# Patient Record
Sex: MALE | URBAN - METROPOLITAN AREA
[De-identification: names, ages, dates, MRNs, and addresses within clinical notes are randomized per-mention and may not be internally consistent; named-entity substitution may affect disease eponyms.]

---

## 2017-10-31 ENCOUNTER — IMPORTED ENCOUNTER (OUTPATIENT)
Dept: URBAN - METROPOLITAN AREA CLINIC 1 | Facility: CLINIC | Age: 67
End: 2017-10-31

## 2017-10-31 PROBLEM — H11.153: Noted: 2017-10-31

## 2017-10-31 PROBLEM — H25.813: Noted: 2017-10-31

## 2017-10-31 PROBLEM — H40.013: Noted: 2017-10-31

## 2017-10-31 PROCEDURE — 92015 DETERMINE REFRACTIVE STATE: CPT

## 2017-10-31 PROCEDURE — 92004 COMPRE OPH EXAM NEW PT 1/>: CPT

## 2017-10-31 NOTE — PATIENT DISCUSSION
1.  Cataract OU:  Visually Significant discussed the risks benefits alternatives and limitations of cataract surgery. The patient stated a full understanding and a desire to proceed with the procedure. The patient will need to return for preop appointment with cataract measurements and to have any additional questions answered and start pre-operative eye drops as directed. Try IOL master today. OS then OD. Patient dot see Randal Ahumada follow-up2. COAG Suspect OU: (0.85/0.75)  IOP 10/10.  -Fm HX. Condition was discussed with patient. Will monitor patient for progression. Will do work up after phaco 3. Pinguecula OU -- Use of sunglasses when exposed to UV light and observation is recommended. 4. Return for an appointment for H and P. with Dr. Chelsea Ho.

## 2018-01-16 ENCOUNTER — IMPORTED ENCOUNTER (OUTPATIENT)
Dept: URBAN - METROPOLITAN AREA CLINIC 1 | Facility: CLINIC | Age: 68
End: 2018-01-16

## 2018-01-16 PROBLEM — H25.813: Noted: 2018-01-16

## 2018-01-16 PROCEDURE — 92012 INTRM OPH EXAM EST PATIENT: CPT

## 2018-01-16 PROCEDURE — 92136 OPHTHALMIC BIOMETRY: CPT

## 2018-01-16 NOTE — PATIENT DISCUSSION
1. Cataract OU:  Visually Significant secondary to glare discussed the risks benefits alternatives and limitations of cataract surgery. The patient stated a full understanding and a desire to proceed with the procedure. Instructed/ discussed with patient if gtts are expensive (over 150 dollars) to call our office so we can recommend alternatives. Pt understood. and Pt understands they will need glasses post-op to achieve their best corrected vision. Phaco PCL OS then OD. 2. COAG Suspect OU (CD 0.9 OU) Fm Hx- Mother. AA. W/u after Phaco. 3. PVD OD- RD precautions. Return for an appointment with Dr. Rocio Warner for AS/HP.

## 2018-01-24 ENCOUNTER — IMPORTED ENCOUNTER (OUTPATIENT)
Dept: URBAN - METROPOLITAN AREA CLINIC 1 | Facility: CLINIC | Age: 68
End: 2018-01-24

## 2018-01-24 PROBLEM — H25.812 COMBINED FORM OF SENILE CATARACT OF LEFT EYE: Status: ACTIVE | Noted: 2018-01-24

## 2018-01-24 PROBLEM — H25.812 COMBINED FORM OF SENILE CATARACT OF LEFT EYE: Status: RESOLVED | Noted: 2018-01-24 | Resolved: 2018-01-24

## 2018-01-25 ENCOUNTER — IMPORTED ENCOUNTER (OUTPATIENT)
Dept: URBAN - METROPOLITAN AREA CLINIC 1 | Facility: CLINIC | Age: 68
End: 2018-01-25

## 2018-01-25 PROBLEM — Z96.1: Noted: 2018-01-25

## 2018-01-25 PROCEDURE — 99024 POSTOP FOLLOW-UP VISIT: CPT

## 2018-01-25 NOTE — PATIENT DISCUSSION
POD#1 CE/IOL Standard OS doing well. Increase tear use to QID Continue all 3 gtts as prescribed and until gone. Ocuflox TIDPrednisolone BIDAcular QDPost op Warnings Reiterated RTC as scheduled

## 2018-02-02 ENCOUNTER — IMPORTED ENCOUNTER (OUTPATIENT)
Dept: URBAN - METROPOLITAN AREA CLINIC 1 | Facility: CLINIC | Age: 68
End: 2018-02-02

## 2018-02-02 PROBLEM — H25.811: Noted: 2018-02-02

## 2018-02-02 PROCEDURE — 92136 OPHTHALMIC BIOMETRY: CPT

## 2018-02-02 NOTE — PATIENT DISCUSSION
1.  Cataract OD: Visually Significant secondary to glare discussed the risks benefits alternatives and limitations of cataract surgery. The patient stated a full understanding and a desire to proceed with the procedure. Pt understands they will need glasses post-op to achieve their best corrected vision. Phaco PCL OD 2. POW#2  CE/IOL OS (Standard) doing well.   Use Prednisolone BID OS till out Use Acular Qdaily OS till out F/u as scheduled 2nd eye

## 2018-02-14 ENCOUNTER — IMPORTED ENCOUNTER (OUTPATIENT)
Dept: URBAN - METROPOLITAN AREA CLINIC 1 | Facility: CLINIC | Age: 68
End: 2018-02-14

## 2018-02-14 PROBLEM — H25.811 COMBINED FORM OF SENILE CATARACT OF RIGHT EYE: Status: RESOLVED | Noted: 2018-02-14 | Resolved: 2018-02-14

## 2018-02-14 PROBLEM — H25.811 COMBINED FORM OF SENILE CATARACT OF RIGHT EYE: Status: ACTIVE | Noted: 2018-02-14

## 2018-02-15 ENCOUNTER — IMPORTED ENCOUNTER (OUTPATIENT)
Dept: URBAN - METROPOLITAN AREA CLINIC 1 | Facility: CLINIC | Age: 68
End: 2018-02-15

## 2018-02-15 PROBLEM — Z96.1: Noted: 2018-02-15

## 2018-02-15 PROCEDURE — 99024 POSTOP FOLLOW-UP VISIT: CPT

## 2018-02-15 NOTE — PATIENT DISCUSSION
1. POD#1 CE/IOL OD (Standard)  doing well. Continue all 3 gtts as prescribed and until gone. Use Prednisolone BID OD Acular Qdaily OD Ocuflox TID OD 2. POW#2  CE/IOL OS (Standard)  doing well.    Use Prednisolone BID OS till out Use Acular Qdaily OS till out F/u as scheduled

## 2018-03-08 ENCOUNTER — IMPORTED ENCOUNTER (OUTPATIENT)
Dept: URBAN - METROPOLITAN AREA CLINIC 1 | Facility: CLINIC | Age: 68
End: 2018-03-08

## 2018-03-08 PROBLEM — Z96.1: Noted: 2018-03-08

## 2018-03-08 PROCEDURE — 99024 POSTOP FOLLOW-UP VISIT: CPT

## 2018-03-08 NOTE — PATIENT DISCUSSION
1. POM#1 CE/IOL Standard OU doing well. Continue Lotemax/Durezol/Prednisolone BID until gone. Continue Prolensa/Ilevro/Acular QD until gone. 2. Return for an appointment for a 30/OCT/HVF in 3-4 months with Dr. Ritika Freitas.

## 2018-08-04 ENCOUNTER — HOSPITAL ENCOUNTER (OUTPATIENT)
Age: 68
Setting detail: OBSERVATION
Discharge: HOME | End: 2018-08-04
Attending: EMERGENCY MEDICINE | Admitting: FAMILY MEDICINE
Payer: MEDICARE

## 2018-08-04 ENCOUNTER — APPOINTMENT (OUTPATIENT)
Dept: CT IMAGING | Age: 68
End: 2018-08-04
Attending: EMERGENCY MEDICINE
Payer: MEDICARE

## 2018-08-04 VITALS
OXYGEN SATURATION: 100 % | HEIGHT: 69 IN | WEIGHT: 226 LBS | RESPIRATION RATE: 19 BRPM | HEART RATE: 93 BPM | SYSTOLIC BLOOD PRESSURE: 129 MMHG | TEMPERATURE: 98.6 F | DIASTOLIC BLOOD PRESSURE: 69 MMHG | BODY MASS INDEX: 33.47 KG/M2

## 2018-08-04 DIAGNOSIS — R55 SYNCOPE AND COLLAPSE: Primary | ICD-10-CM

## 2018-08-04 DIAGNOSIS — F10.920 ALCOHOLIC INTOXICATION WITHOUT COMPLICATION (HCC): ICD-10-CM

## 2018-08-04 DIAGNOSIS — D64.9 LOW HEMOGLOBIN: ICD-10-CM

## 2018-08-04 PROBLEM — F10.929 ALCOHOL INTOXICATION (HCC): Status: ACTIVE | Noted: 2018-08-04

## 2018-08-04 LAB
ALBUMIN SERPL-MCNC: 3.4 G/DL (ref 3.4–5)
ALBUMIN/GLOB SERPL: 0.8 {RATIO} (ref 0.8–1.7)
ALP SERPL-CCNC: 76 U/L (ref 45–117)
ALT SERPL-CCNC: 17 U/L (ref 16–61)
ANION GAP SERPL CALC-SCNC: 7 MMOL/L (ref 3–18)
AST SERPL-CCNC: 33 U/L (ref 15–37)
BASOPHILS # BLD: 0.1 K/UL (ref 0–0.1)
BASOPHILS NFR BLD: 1 % (ref 0–2)
BILIRUB SERPL-MCNC: 0.2 MG/DL (ref 0.2–1)
BUN SERPL-MCNC: 6 MG/DL (ref 7–18)
BUN/CREAT SERPL: 7 (ref 12–20)
CALCIUM SERPL-MCNC: 8.5 MG/DL (ref 8.5–10.1)
CHLORIDE SERPL-SCNC: 113 MMOL/L (ref 100–108)
CO2 SERPL-SCNC: 25 MMOL/L (ref 21–32)
CREAT SERPL-MCNC: 0.89 MG/DL (ref 0.6–1.3)
DIFFERENTIAL METHOD BLD: ABNORMAL
EOSINOPHIL # BLD: 0.1 K/UL (ref 0–0.4)
EOSINOPHIL NFR BLD: 3 % (ref 0–5)
ERYTHROCYTE [DISTWIDTH] IN BLOOD BY AUTOMATED COUNT: 22.2 % (ref 11.6–14.5)
ETHANOL SERPL-MCNC: 307 MG/DL (ref 0–3)
GLOBULIN SER CALC-MCNC: 4.5 G/DL (ref 2–4)
GLUCOSE SERPL-MCNC: 114 MG/DL (ref 74–99)
HCT VFR BLD AUTO: 24.6 % (ref 36–48)
HGB BLD-MCNC: 7.5 G/DL (ref 13–16)
LYMPHOCYTES # BLD: 2.2 K/UL (ref 0.9–3.6)
LYMPHOCYTES NFR BLD: 53 % (ref 21–52)
MAGNESIUM SERPL-MCNC: 2.3 MG/DL (ref 1.6–2.6)
MCH RBC QN AUTO: 22.1 PG (ref 24–34)
MCHC RBC AUTO-ENTMCNC: 30.5 G/DL (ref 31–37)
MCV RBC AUTO: 72.4 FL (ref 74–97)
MONOCYTES # BLD: 0.5 K/UL (ref 0.05–1.2)
MONOCYTES NFR BLD: 12 % (ref 3–10)
NEUTS SEG # BLD: 1.3 K/UL (ref 1.8–8)
NEUTS SEG NFR BLD: 31 % (ref 40–73)
PLATELET # BLD AUTO: 204 K/UL (ref 135–420)
PMV BLD AUTO: 8.9 FL (ref 9.2–11.8)
POTASSIUM SERPL-SCNC: 3.9 MMOL/L (ref 3.5–5.5)
PROT SERPL-MCNC: 7.9 G/DL (ref 6.4–8.2)
RBC # BLD AUTO: 3.4 M/UL (ref 4.7–5.5)
SODIUM SERPL-SCNC: 145 MMOL/L (ref 136–145)
TROPONIN I SERPL-MCNC: <0.02 NG/ML (ref 0–0.04)
WBC # BLD AUTO: 4.3 K/UL (ref 4.6–13.2)

## 2018-08-04 PROCEDURE — 36430 TRANSFUSION BLD/BLD COMPNT: CPT

## 2018-08-04 PROCEDURE — 85025 COMPLETE CBC W/AUTO DIFF WBC: CPT | Performed by: EMERGENCY MEDICINE

## 2018-08-04 PROCEDURE — P9016 RBC LEUKOCYTES REDUCED: HCPCS | Performed by: EMERGENCY MEDICINE

## 2018-08-04 PROCEDURE — 83735 ASSAY OF MAGNESIUM: CPT | Performed by: EMERGENCY MEDICINE

## 2018-08-04 PROCEDURE — 74011000250 HC RX REV CODE- 250: Performed by: EMERGENCY MEDICINE

## 2018-08-04 PROCEDURE — 86900 BLOOD TYPING SEROLOGIC ABO: CPT | Performed by: EMERGENCY MEDICINE

## 2018-08-04 PROCEDURE — 80307 DRUG TEST PRSMV CHEM ANLYZR: CPT | Performed by: EMERGENCY MEDICINE

## 2018-08-04 PROCEDURE — 96365 THER/PROPH/DIAG IV INF INIT: CPT

## 2018-08-04 PROCEDURE — 70450 CT HEAD/BRAIN W/O DYE: CPT

## 2018-08-04 PROCEDURE — 84484 ASSAY OF TROPONIN QUANT: CPT | Performed by: EMERGENCY MEDICINE

## 2018-08-04 PROCEDURE — 99218 HC RM OBSERVATION: CPT

## 2018-08-04 PROCEDURE — 93005 ELECTROCARDIOGRAM TRACING: CPT

## 2018-08-04 PROCEDURE — 86920 COMPATIBILITY TEST SPIN: CPT | Performed by: EMERGENCY MEDICINE

## 2018-08-04 PROCEDURE — 99284 EMERGENCY DEPT VISIT MOD MDM: CPT

## 2018-08-04 PROCEDURE — 74011250636 HC RX REV CODE- 250/636: Performed by: EMERGENCY MEDICINE

## 2018-08-04 PROCEDURE — 80053 COMPREHEN METABOLIC PANEL: CPT | Performed by: EMERGENCY MEDICINE

## 2018-08-04 PROCEDURE — 96366 THER/PROPH/DIAG IV INF ADDON: CPT

## 2018-08-04 RX ORDER — SODIUM CHLORIDE 0.9 % (FLUSH) 0.9 %
5-10 SYRINGE (ML) INJECTION AS NEEDED
Status: DISCONTINUED | OUTPATIENT
Start: 2018-08-04 | End: 2018-08-05 | Stop reason: HOSPADM

## 2018-08-04 RX ORDER — LORAZEPAM 1 MG/1
1 TABLET ORAL
Status: DISCONTINUED | OUTPATIENT
Start: 2018-08-04 | End: 2018-08-05 | Stop reason: HOSPADM

## 2018-08-04 RX ORDER — LORAZEPAM 2 MG/ML
2 INJECTION INTRAMUSCULAR
Status: DISCONTINUED | OUTPATIENT
Start: 2018-08-04 | End: 2018-08-05 | Stop reason: HOSPADM

## 2018-08-04 RX ORDER — LORAZEPAM 1 MG/1
2 TABLET ORAL
Status: DISCONTINUED | OUTPATIENT
Start: 2018-08-04 | End: 2018-08-05 | Stop reason: HOSPADM

## 2018-08-04 RX ORDER — SODIUM CHLORIDE 0.9 % (FLUSH) 0.9 %
5-10 SYRINGE (ML) INJECTION EVERY 8 HOURS
Status: DISCONTINUED | OUTPATIENT
Start: 2018-08-04 | End: 2018-08-05 | Stop reason: HOSPADM

## 2018-08-04 RX ORDER — LORAZEPAM 2 MG/ML
3 INJECTION INTRAMUSCULAR
Status: DISCONTINUED | OUTPATIENT
Start: 2018-08-04 | End: 2018-08-05 | Stop reason: HOSPADM

## 2018-08-04 RX ORDER — SODIUM CHLORIDE 9 MG/ML
250 INJECTION, SOLUTION INTRAVENOUS AS NEEDED
Status: DISCONTINUED | OUTPATIENT
Start: 2018-08-04 | End: 2018-08-05 | Stop reason: HOSPADM

## 2018-08-04 RX ORDER — LORAZEPAM 2 MG/ML
1 INJECTION INTRAMUSCULAR
Status: DISCONTINUED | OUTPATIENT
Start: 2018-08-04 | End: 2018-08-05 | Stop reason: HOSPADM

## 2018-08-04 RX ADMIN — FOLIC ACID: 5 INJECTION, SOLUTION INTRAMUSCULAR; INTRAVENOUS; SUBCUTANEOUS at 15:54

## 2018-08-04 NOTE — Clinical Note
Patient Class[de-identified] Observation [385] Type of Bed: Telemetry [19] Reason for Observation: syncope Admitting Diagnosis: Syncope [109903] Admitting Diagnosis: Alcohol intoxication ([facility] [220033] Admitting Diagnosis: Anemia [596371] Admitting Physician: Dexter Francis Attending Physician: Dexter Francis

## 2018-08-04 NOTE — ED TRIAGE NOTES
Pt requesting a beer from staff at first encounter. Pt c/o falling 9 times since September 2015. Smells of etoh.

## 2018-08-04 NOTE — PROGRESS NOTES
5:56 PM :Pt care assumed from Dr. Jessica Reyna , ED provider. Pt complaint(s), current treatment plan, progression and available diagnostic results have been discussed thoroughly. Rounding occurred: yes  Intended Disposition: Home   Pending diagnostic reports and/or labs (please list): completion of transfusion and sober ride home     Pt has ride, stable for dc home. Pt ambulating without difficulty. Scribe Attestation     [employer] acting as a scribe for and in the presence of Kayce Durham DO      August 04, 2018 at 5:56 PM       Provider Attestation:      I personally performed the services described in the documentation, reviewed the documentation, as recorded by the scribe in my presence, and it accurately and completely records my words and actions.  August 04, 2018 at 5:56 PM - Kayce Durham DO

## 2018-08-04 NOTE — DISCHARGE INSTRUCTIONS
Fainting: Care Instructions  Your Care Instructions    When you faint, or pass out, you lose consciousness for a short time. A brief drop in blood flow to the brain often causes it. When you fall or lie down, more blood flows to your brain and you regain consciousness. Emotional stress, pain, or overheating-especially if you have been standing-can make you faint. In these cases, fainting is usually not serious. But fainting can be a sign of a more serious problem. Your doctor may want you to have more tests to rule out other causes. The treatment you need depends on the reason why you fainted. The doctor has checked you carefully, but problems can develop later. If you notice any problems or new symptoms, get medical treatment right away. Follow-up care is a key part of your treatment and safety. Be sure to make and go to all appointments, and call your doctor if you are having problems. It's also a good idea to know your test results and keep a list of the medicines you take. How can you care for yourself at home? · Drink plenty of fluids to prevent dehydration. If you have kidney, heart, or liver disease and have to limit fluids, talk with your doctor before you increase your fluid intake. When should you call for help? Call 911 anytime you think you may need emergency care. For example, call if:    · You have symptoms of a heart problem. These may include:  ¨ Chest pain or pressure. ¨ Severe trouble breathing. ¨ A fast or irregular heartbeat. ¨ Lightheadedness or sudden weakness. ¨ Coughing up pink, foamy mucus. ¨ Passing out. After you call 911, the  may tell you to chew 1 adult-strength or 2 to 4 low-dose aspirin. Wait for an ambulance. Do not try to drive yourself.     · You have symptoms of a stroke. These may include:  ¨ Sudden numbness, tingling, weakness, or loss of movement in your face, arm, or leg, especially on only one side of your body. ¨ Sudden vision changes.   ¨ Sudden trouble speaking. ¨ Sudden confusion or trouble understanding simple statements. ¨ Sudden problems with walking or balance. ¨ A sudden, severe headache that is different from past headaches.     · You passed out (lost consciousness) again.    Watch closely for changes in your health, and be sure to contact your doctor if:    · You do not get better as expected. Where can you learn more? Go to http://shaquille-licha.info/. Enter G826 in the search box to learn more about \"Fainting: Care Instructions. \"  Current as of: November 20, 2017  Content Version: 11.7  © 9468-1228 [employer]. Care instructions adapted under license by [employer] (which disclaims liability or warranty for this information). If you have questions about a medical condition or this instruction, always ask your healthcare professional. [address] any warranty or liability for your use of this information. Anemia: Care Instructions  Your Care Instructions    Anemia is a low level of red blood cells, which carry oxygen throughout your body. Many things can cause anemia. Lack of iron is one of the most common causes. Your body needs iron to make hemoglobin, a substance in red blood cells that carries oxygen from the lungs to your body's cells. Without enough iron, the body produces fewer and smaller red blood cells. As a result, your body's cells do not get enough oxygen, and you feel tired and weak. And you may have trouble concentrating. Bleeding is the most common cause of a lack of iron. You may have heavy menstrual bleeding or bleeding caused by conditions such as ulcers, hemorrhoids, or cancer. Regular use of aspirin or other anti-inflammatory medicines (such as ibuprofen) also can cause bleeding in some people.  A lack of iron in your diet also can cause anemia, especially at times when the body needs more iron, such as during pregnancy, infancy, and the teen years.  Your doctor may have prescribed iron pills. It may take several months of treatment for your iron levels to return to normal. Your doctor also may suggest that you eat foods that are rich in iron, such as meat and beans. There are many other causes of anemia. It is not always due to a lack of iron. Finding the specific cause of your anemia will help your doctor find the right treatment for you. Follow-up care is a key part of your treatment and safety. Be sure to make and go to all appointments, and call your doctor if you are having problems. It's also a good idea to know your test results and keep a list of the medicines you take. How can you care for yourself at home? · Take your medicines exactly as prescribed. Call your doctor if you think you are having a problem with your medicine. · If your doctor recommends iron pills, take them as directed:  ¨ Try to take the pills on an empty stomach about 1 hour before or 2 hours after meals. But you may need to take iron with food to avoid an upset stomach. ¨ Do not take antacids or drink milk or caffeine drinks (such as coffee, tea, or cola) at the same time or within 2 hours of the time that you take your iron. They can make it hard for your body to absorb the iron. ¨ Vitamin C (from food or supplements) helps your body absorb iron. Try taking iron pills with a glass of orange juice or some other food that is high in vitamin C, such as citrus fruits. ¨ Iron pills may cause stomach problems, such as heartburn, nausea, diarrhea, constipation, and cramps. Be sure to drink plenty of fluids, and include fruits, vegetables, and fiber in your diet each day. Iron pills often make your bowel movements dark or green. ¨ If you forget to take an iron pill, do not take a double dose of iron the next time you take a pill. ¨ Keep iron pills out of the reach of small children. An overdose of iron can be very dangerous.   · Follow your doctor's advice about eating iron-rich foods. These include red meat, shellfish, poultry, eggs, beans, raisins, whole-grain bread, and leafy green vegetables. · Steam vegetables to help them keep their iron content. When should you call for help? Call 911 anytime you think you may need emergency care. For example, call if:    · You have symptoms of a heart attack. These may include:  ¨ Chest pain or pressure, or a strange feeling in the chest.  ¨ Sweating. ¨ Shortness of breath. ¨ Nausea or vomiting. ¨ Pain, pressure, or a strange feeling in the back, neck, jaw, or upper belly or in one or both shoulders or arms. ¨ Lightheadedness or sudden weakness. ¨ A fast or irregular heartbeat. After you call 911, the  may tell you to chew 1 adult-strength or 2 to 4 low-dose aspirin. Wait for an ambulance. Do not try to drive yourself.     · You passed out (lost consciousness).    Call your doctor now or seek immediate medical care if:    · You have new or increased shortness of breath.     · You are dizzy or lightheaded, or you feel like you may faint.     · Your fatigue and weakness continue or get worse.     · You have any abnormal bleeding, such as:  ¨ Nosebleeds. ¨ Vaginal bleeding that is different (heavier, more frequent, at a different time of the month) than what you are used to. ¨ Bloody or black stools, or rectal bleeding. ¨ Bloody or pink urine.    Watch closely for changes in your health, and be sure to contact your doctor if:    · You do not get better as expected. Where can you learn more? Go to http://shaquille-licha.info/. Enter R301 in the search box to learn more about \"Anemia: Care Instructions. \"  Current as of: October 9, 2017  Content Version: 11.7  © 6822-5405 [employer]. Care instructions adapted under license by [employer] (which disclaims liability or warranty for this information).  If you have questions about a medical condition or this instruction, always ask your healthcare professional. [address] any warranty or liability for your use of this information.

## 2018-08-04 NOTE — ED PROVIDER NOTES
EMERGENCY DEPARTMENT HISTORY AND PHYSICAL EXAM    2:03 PM      Date: 8/4/2018  Patient Name: Deena Cuevas    History of Presenting Illness     Chief Complaint   Patient presents with    Syncope         History Provided By: Patient    Chief Complaint: syncope  Duration:  Hours  Timing:  Acute  Location: generalized  Severity: Mild  Modifying Factors: He says he had \"blood in the brain\" in 2015 and has had 10 syncopal episodes since then. Associated Symptoms: Denies Sz, nausea, and vomiting      Additional History (Context): Deena Cuevas is a 78 y.o. male with No significant past medical history who presents with acute mild generalized syncopal episode that occurred a few hours ago PTA. He had a beer earlier today PTA. Denies Sz, nausea, and vomiting. He says he had \"blood in the brain\" in 2015 and has had 10 syncopal episodes since then. PCP: Ludwin Golden MD        Past History     Past Medical History:  History reviewed. No pertinent past medical history. Past Surgical History:  Past Surgical History:   Procedure Laterality Date    HX CATARACT REMOVAL Left 01/24/2018       Family History:  History reviewed. No pertinent family history. Social History:  Social History   Substance Use Topics    Smoking status: Current Every Day Smoker     Packs/day: 0.50     Years: 45.00    Smokeless tobacco: Never Used    Alcohol use 2.4 oz/week     4 Cans of beer per week       Allergies:  No Known Allergies      Review of Systems       Review of Systems   Gastrointestinal: Negative for nausea and vomiting. Neurological: Positive for syncope. Negative for seizures. All other systems reviewed and are negative. Physical Exam     Visit Vitals    /65    Temp 98.7 °F (37.1 °C)    Ht 5' 9\" (1.753 m)    Wt 102.5 kg (226 lb)    SpO2 98%    BMI 33.37 kg/m2         Physical Exam   Constitutional: He is oriented to person, place, and time. He appears well-developed and well-nourished.  No distress. Pt stutters   HENT:   Head: Normocephalic and atraumatic. Mouth/Throat: Oropharynx is clear and moist.   Eyes: Conjunctivae and EOM are normal. Pupils are equal, round, and reactive to light. No scleral icterus. Neck: Normal range of motion. Neck supple. Cardiovascular: Normal rate, regular rhythm and normal heart sounds. No murmur heard. Pulmonary/Chest: Effort normal and breath sounds normal. No respiratory distress. Abdominal: Soft. Bowel sounds are normal. He exhibits no distension. There is no tenderness. Genitourinary:   Genitourinary Comments: Rectal exam: Guaiac negative   Musculoskeletal: He exhibits no edema. Lymphadenopathy:     He has no cervical adenopathy. Neurological: He is alert and oriented to person, place, and time. Coordination normal.   Skin: Skin is warm and dry. No rash noted. Psychiatric: He has a normal mood and affect. His behavior is normal.   Nursing note and vitals reviewed.         Diagnostic Study Results     Labs -  Recent Results (from the past 12 hour(s))   EKG, 12 LEAD, INITIAL    Collection Time: 08/04/18  1:52 PM   Result Value Ref Range    Ventricular Rate 95 BPM    Atrial Rate 95 BPM    P-R Interval 144 ms    QRS Duration 86 ms    Q-T Interval 340 ms    QTC Calculation (Bezet) 427 ms    Calculated P Axis 37 degrees    Calculated R Axis 10 degrees    Calculated T Axis 53 degrees    Diagnosis       Normal sinus rhythm  Normal ECG  No previous ECGs available     CBC WITH AUTOMATED DIFF    Collection Time: 08/04/18  1:57 PM   Result Value Ref Range    WBC 4.3 (L) 4.6 - 13.2 K/uL    RBC 3.40 (L) 4.70 - 5.50 M/uL    HGB 7.5 (L) 13.0 - 16.0 g/dL    HCT 24.6 (L) 36.0 - 48.0 %    MCV 72.4 (L) 74.0 - 97.0 FL    MCH 22.1 (L) 24.0 - 34.0 PG    MCHC 30.5 (L) 31.0 - 37.0 g/dL    RDW 22.2 (H) 11.6 - 14.5 %    PLATELET 461 386 - 048 K/uL    MPV 8.9 (L) 9.2 - 11.8 FL    NEUTROPHILS 31 (L) 40 - 73 %    LYMPHOCYTES 53 (H) 21 - 52 %    MONOCYTES 12 (H) 3 - 10 % EOSINOPHILS 3 0 - 5 %    BASOPHILS 1 0 - 2 %    ABS. NEUTROPHILS 1.3 (L) 1.8 - 8.0 K/UL    ABS. LYMPHOCYTES 2.2 0.9 - 3.6 K/UL    ABS. MONOCYTES 0.5 0.05 - 1.2 K/UL    ABS. EOSINOPHILS 0.1 0.0 - 0.4 K/UL    ABS. BASOPHILS 0.1 0.0 - 0.1 K/UL    DF AUTOMATED     METABOLIC PANEL, COMPREHENSIVE    Collection Time: 08/04/18  1:57 PM   Result Value Ref Range    Sodium 145 136 - 145 mmol/L    Potassium 3.9 3.5 - 5.5 mmol/L    Chloride 113 (H) 100 - 108 mmol/L    CO2 25 21 - 32 mmol/L    Anion gap 7 3.0 - 18 mmol/L    Glucose 114 (H) 74 - 99 mg/dL    BUN 6 (L) 7.0 - 18 MG/DL    Creatinine 0.89 0.6 - 1.3 MG/DL    BUN/Creatinine ratio 7 (L) 12 - 20      GFR est AA >60 >60 ml/min/1.73m2    GFR est non-AA >60 >60 ml/min/1.73m2    Calcium 8.5 8.5 - 10.1 MG/DL    Bilirubin, total 0.2 0.2 - 1.0 MG/DL    ALT (SGPT) 17 16 - 61 U/L    AST (SGOT) 33 15 - 37 U/L    Alk. phosphatase 76 45 - 117 U/L    Protein, total 7.9 6.4 - 8.2 g/dL    Albumin 3.4 3.4 - 5.0 g/dL    Globulin 4.5 (H) 2.0 - 4.0 g/dL    A-G Ratio 0.8 0.8 - 1.7     MAGNESIUM    Collection Time: 08/04/18  1:57 PM   Result Value Ref Range    Magnesium 2.3 1.6 - 2.6 mg/dL   ETHYL ALCOHOL    Collection Time: 08/04/18  1:57 PM   Result Value Ref Range    ALCOHOL(ETHYL),SERUM 307 (H) 0 - 3 MG/DL       Radiologic Studies -   CT HEAD WO CONT    (Results Pending)         Medical Decision Making   I am the first provider for this patient. I reviewed the vital signs, available nursing notes, past medical history, past surgical history, family history and social history. Vital Signs-Reviewed the patient's vital signs. Pulse Oximetry Analysis -  98 on room air    EKG: Interpreted by the EP. Time Interpreted: 13:53   Rate: 95   Rhythm: Normal Sinus Rhythm    Interpretation:No STEMI    Records Reviewed: Nursing Notes (Time of Review: 2:03 PM)    ED Course: Progress Notes, Reevaluation, and Consults:  4:00 PM Consult: I discussed care with Dr. Mira Marie (Hospitalist).   It was a standard discussion including patient history, chief complaint, available diagnostic results, and predicted treatment course. Provider Notes (Medical Decision Making):   MDM  Number of Diagnoses or Management Options  Alcoholic intoxication without complication ([facility] [facility]):   Low hemoglobin:   Syncope and collapse:   Diagnosis management comments: Guaiac negative with low hemoglobin in and recurrent syncope. Will consider admission and 1 unit packed red blood cells ordered. Pt placed on alcohol withdrawal order set pathway     Pt refused admission understood risk and benefits of admission vs discharge agreed to blood transfusion will dc home after          Amount and/or Complexity of Data Reviewed  Clinical lab tests: ordered and reviewed  Tests in the radiology section of CPT®: ordered and reviewed  Independent visualization of images, tracings, or specimens: yes    Risk of Complications, Morbidity, and/or Mortality  Presenting problems: high  Diagnostic procedures: moderate  Management options: moderate        For Hospitalized Patients:    1. Hospitalization Decision Time:  The decision to hospitalize the patient was made by Dr. Yajaira Keene at 1600 on 8/4/2018    2. Aspirin: Aspirin was not given because the patient did not present with a stroke at the time of their Emergency Department evaluation    Diagnosis     Clinical Impression:   1. Syncope and collapse    2. Low hemoglobin    3.  Alcoholic intoxication without complication ([facility]        Disposition: ADMIT pt was discharged     Follow-up Information     None           Patient's Medications    No medications on file     _______________________________    Attestations:  [id number] acting as a scribe for and in the presence of Lyric Galloway MD      August 04, 2018 at 2:03 PM       Provider Attestation:      I personally performed the services described in the documentation, reviewed the documentation, as recorded by the scribe in my presence, and it accurately and completely records my words and actions.  August 04, 2018 at 2:03 PM - Kasey Arevalo MD    _______________________________

## 2018-08-05 LAB
ABO + RH BLD: NORMAL
ATRIAL RATE: 95 BPM
BLD PROD TYP BPU: NORMAL
BLOOD GROUP ANTIBODIES SERPL: NORMAL
BPU ID: NORMAL
CALCULATED P AXIS, ECG09: 37 DEGREES
CALCULATED R AXIS, ECG10: 10 DEGREES
CALCULATED T AXIS, ECG11: 53 DEGREES
CALLED TO:,BCALL1: NORMAL
CROSSMATCH RESULT,%XM: NORMAL
DIAGNOSIS, 93000: NORMAL
P-R INTERVAL, ECG05: 144 MS
Q-T INTERVAL, ECG07: 340 MS
QRS DURATION, ECG06: 86 MS
QTC CALCULATION (BEZET), ECG08: 427 MS
SPECIMEN EXP DATE BLD: NORMAL
STATUS OF UNIT,%ST: NORMAL
UNIT DIVISION, %UDIV: 0
VENTRICULAR RATE, ECG03: 95 BPM

## 2018-08-05 NOTE — ED NOTES
I have reviewed discharge instructions with the patient. The patient verbalized understanding. Patient armband removed and shredded. Pt d/c'd to home awake, alert and in NAD.   Pt driven home by family member who is present in the ed

## 2018-09-15 ENCOUNTER — APPOINTMENT (OUTPATIENT)
Dept: ULTRASOUND IMAGING | Age: 68
End: 2018-09-15
Attending: EMERGENCY MEDICINE
Payer: MEDICARE

## 2018-09-15 ENCOUNTER — HOSPITAL ENCOUNTER (EMERGENCY)
Age: 68
Discharge: HOME | End: 2018-09-15
Attending: EMERGENCY MEDICINE
Payer: MEDICARE

## 2018-09-15 ENCOUNTER — APPOINTMENT (OUTPATIENT)
Dept: GENERAL RADIOLOGY | Age: 68
End: 2018-09-15
Attending: EMERGENCY MEDICINE
Payer: MEDICARE

## 2018-09-15 ENCOUNTER — APPOINTMENT (OUTPATIENT)
Dept: CT IMAGING | Age: 68
End: 2018-09-15
Attending: EMERGENCY MEDICINE
Payer: MEDICARE

## 2018-09-15 VITALS
SYSTOLIC BLOOD PRESSURE: 148 MMHG | RESPIRATION RATE: 19 BRPM | WEIGHT: 212 LBS | OXYGEN SATURATION: 100 % | DIASTOLIC BLOOD PRESSURE: 64 MMHG | BODY MASS INDEX: 31.4 KG/M2 | HEIGHT: 69 IN | TEMPERATURE: 98.5 F | HEART RATE: 58 BPM

## 2018-09-15 DIAGNOSIS — R10.13 ABDOMINAL PAIN, EPIGASTRIC: Primary | ICD-10-CM

## 2018-09-15 DIAGNOSIS — D64.9 ANEMIA, UNSPECIFIED TYPE: ICD-10-CM

## 2018-09-15 DIAGNOSIS — K81.0 CHOLECYSTITIS, ACUTE: ICD-10-CM

## 2018-09-15 DIAGNOSIS — R93.5 ABNORMAL CT OF THE ABDOMEN: ICD-10-CM

## 2018-09-15 LAB
ALBUMIN SERPL-MCNC: 3.1 G/DL (ref 3.4–5)
ALBUMIN/GLOB SERPL: 0.7 {RATIO} (ref 0.8–1.7)
ALP SERPL-CCNC: 71 U/L (ref 45–117)
ALT SERPL-CCNC: 16 U/L (ref 16–61)
ANION GAP SERPL CALC-SCNC: 9 MMOL/L (ref 3–18)
AST SERPL-CCNC: 26 U/L (ref 15–37)
BASOPHILS # BLD: 0 K/UL (ref 0–0.1)
BASOPHILS NFR BLD: 0 % (ref 0–2)
BILIRUB SERPL-MCNC: 0.5 MG/DL (ref 0.2–1)
BUN SERPL-MCNC: 6 MG/DL (ref 7–18)
BUN/CREAT SERPL: 7 (ref 12–20)
CALCIUM SERPL-MCNC: 8.8 MG/DL (ref 8.5–10.1)
CHLORIDE SERPL-SCNC: 103 MMOL/L (ref 100–108)
CO2 SERPL-SCNC: 25 MMOL/L (ref 21–32)
CREAT SERPL-MCNC: 0.89 MG/DL (ref 0.6–1.3)
DIFFERENTIAL METHOD BLD: ABNORMAL
EOSINOPHIL # BLD: 0.1 K/UL (ref 0–0.4)
EOSINOPHIL NFR BLD: 1 % (ref 0–5)
ERYTHROCYTE [DISTWIDTH] IN BLOOD BY AUTOMATED COUNT: 24.4 % (ref 11.6–14.5)
GLOBULIN SER CALC-MCNC: 4.6 G/DL (ref 2–4)
GLUCOSE SERPL-MCNC: 110 MG/DL (ref 74–99)
HCT VFR BLD AUTO: 25.9 % (ref 36–48)
HGB BLD-MCNC: 7.9 G/DL (ref 13–16)
INR PPP: 1.2 (ref 0.8–1.2)
LIPASE SERPL-CCNC: 115 U/L (ref 73–393)
LYMPHOCYTES # BLD: 1.3 K/UL (ref 0.9–3.6)
LYMPHOCYTES NFR BLD: 23 % (ref 21–52)
MCH RBC QN AUTO: 22.7 PG (ref 24–34)
MCHC RBC AUTO-ENTMCNC: 30.5 G/DL (ref 31–37)
MCV RBC AUTO: 74.4 FL (ref 74–97)
MONOCYTES # BLD: 1.5 K/UL (ref 0.05–1.2)
MONOCYTES NFR BLD: 28 % (ref 3–10)
NEUTS SEG # BLD: 2.6 K/UL (ref 1.8–8)
NEUTS SEG NFR BLD: 48 % (ref 40–73)
PLATELET # BLD AUTO: 148 K/UL (ref 135–420)
POTASSIUM SERPL-SCNC: 3.9 MMOL/L (ref 3.5–5.5)
PROT SERPL-MCNC: 7.7 G/DL (ref 6.4–8.2)
PROTHROMBIN TIME: 14.8 SEC (ref 11.5–15.2)
RBC # BLD AUTO: 3.48 M/UL (ref 4.7–5.5)
SODIUM SERPL-SCNC: 137 MMOL/L (ref 136–145)
WBC # BLD AUTO: 5.5 K/UL (ref 4.6–13.2)

## 2018-09-15 PROCEDURE — 74011250637 HC RX REV CODE- 250/637: Performed by: EMERGENCY MEDICINE

## 2018-09-15 PROCEDURE — 74019 RADEX ABDOMEN 2 VIEWS: CPT

## 2018-09-15 PROCEDURE — 96374 THER/PROPH/DIAG INJ IV PUSH: CPT

## 2018-09-15 PROCEDURE — 76705 ECHO EXAM OF ABDOMEN: CPT

## 2018-09-15 PROCEDURE — 74176 CT ABD & PELVIS W/O CONTRAST: CPT

## 2018-09-15 PROCEDURE — 85610 PROTHROMBIN TIME: CPT | Performed by: EMERGENCY MEDICINE

## 2018-09-15 PROCEDURE — 96375 TX/PRO/DX INJ NEW DRUG ADDON: CPT

## 2018-09-15 PROCEDURE — 83690 ASSAY OF LIPASE: CPT | Performed by: EMERGENCY MEDICINE

## 2018-09-15 PROCEDURE — 74011000250 HC RX REV CODE- 250: Performed by: EMERGENCY MEDICINE

## 2018-09-15 PROCEDURE — 85025 COMPLETE CBC W/AUTO DIFF WBC: CPT | Performed by: EMERGENCY MEDICINE

## 2018-09-15 PROCEDURE — 99285 EMERGENCY DEPT VISIT HI MDM: CPT

## 2018-09-15 PROCEDURE — 80053 COMPREHEN METABOLIC PANEL: CPT | Performed by: EMERGENCY MEDICINE

## 2018-09-15 PROCEDURE — 74011250636 HC RX REV CODE- 250/636: Performed by: EMERGENCY MEDICINE

## 2018-09-15 RX ORDER — OXYCODONE HYDROCHLORIDE 5 MG/1
5 TABLET ORAL
Status: COMPLETED | OUTPATIENT
Start: 2018-09-15 | End: 2018-09-15

## 2018-09-15 RX ORDER — FAMOTIDINE 10 MG/ML
20 INJECTION INTRAVENOUS
Status: COMPLETED | OUTPATIENT
Start: 2018-09-15 | End: 2018-09-15

## 2018-09-15 RX ORDER — OXYCODONE HYDROCHLORIDE 5 MG/1
5 CAPSULE ORAL
Qty: 15 CAP | Refills: 0 | Status: SHIPPED | OUTPATIENT
Start: 2018-09-15 | End: 2019-04-06

## 2018-09-15 RX ORDER — KETOROLAC TROMETHAMINE 30 MG/ML
15 INJECTION, SOLUTION INTRAMUSCULAR; INTRAVENOUS
Status: COMPLETED | OUTPATIENT
Start: 2018-09-15 | End: 2018-09-15

## 2018-09-15 RX ORDER — ONDANSETRON 4 MG/1
4 TABLET, ORALLY DISINTEGRATING ORAL
Qty: 10 TAB | Refills: 0 | Status: SHIPPED | OUTPATIENT
Start: 2018-09-15 | End: 2018-09-18

## 2018-09-15 RX ADMIN — KETOROLAC TROMETHAMINE 15 MG: 30 INJECTION, SOLUTION INTRAMUSCULAR at 02:43

## 2018-09-15 RX ADMIN — FAMOTIDINE 20 MG: 10 INJECTION, SOLUTION INTRAVENOUS at 02:43

## 2018-09-15 RX ADMIN — OXYCODONE HYDROCHLORIDE 5 MG: 5 TABLET ORAL at 09:17

## 2018-09-15 NOTE — PROGRESS NOTES
Referral received to assist with transportation to see surgeon on Monday. Chart reviewed. Spoke with ED MD and states was referred to Dr Romel Hernandez but no appointment is set up. Spoke with pt and he thought he has an appointment with Dr Romel Hernandez. Advised pt to call surgeon's office on Monday to schedule an appointment then call Medicaid transport. Pt verbalized understanding.

## 2018-09-15 NOTE — ED NOTES
Bedside and Verbal shift change report given to Rony Del Valle (oncoming nurse) by Tiana Yanes (offgoing nurse). Report included the following information SBAR.

## 2018-09-15 NOTE — DISCHARGE INSTRUCTIONS
Learning About Acute Cholecystitis  What is cholecystitis? Cholecystitis (say \"koh-lih-sis-TY-tus\") is inflammation of the gallbladder. The gallbladder stores bile. Bile helps the body digest food. Normally, the bile flows from the gallbladder to the small intestine. A gallstone stuck in the cystic duct is most often the cause of sudden (acute) cholecystitis. The cystic duct is the tube that carries the bile out of the gallbladder. The gallstone blocks the bile from leaving the gallbladder. This results in an irritated and swollen gallbladder. The disease can also be caused by infection or trauma, such as an injury from a car accident. Cholecystitis has to be treated right away. You will probably have to go to the hospital. Surgery is the usual treatment. What are the symptoms? Symptoms include:  · Steady and severe pain in the upper right part of belly. This is the most common symptom. The pain can sometimes move to your back or right shoulder blade. It may last for more than 6 hours. · Nausea or vomiting. · A fever. How is it treated? The main way to treat this disease is surgery to remove the gallbladder. This surgery can often be done through small cuts (incisions) in the belly. This is called a laparoscopic cholecystectomy. In some cases, you may need a more extensive surgery. You may need surgery as soon as possible. The doctor may try to reduce swelling and irritation in the gallbladder before removing it. You may be given fluids and antibiotics through an IV. You may also be given pain medicine. Follow-up care is a key part of your treatment and safety. Be sure to make and go to all appointments, and call your doctor if you are having problems. It's also a good idea to know your test results and keep a list of the medicines you take. Where can you learn more? Go to http://shaquille-licha.info/.   Enter T940 in the search box to learn more about \"Learning About Acute Cholecystitis. \"  Current as of: May 12, 2017  Content Version: 11.7  © 4877-0471 [employer]. Care instructions adapted under license by [employer] (which disclaims liability or warranty for this information). If you have questions about a medical condition or this instruction, always ask your healthcare professional. [address] any warranty or liability for your use of this information. Abdominal Pain: Care Instructions  Your Care Instructions    Abdominal pain has many possible causes. Some aren't serious and get better on their own in a few days. Others need more testing and treatment. If your pain continues or gets worse, you need to be rechecked and may need more tests to find out what is wrong. You may need surgery to correct the problem. Don't ignore new symptoms, such as fever, nausea and vomiting, urination problems, pain that gets worse, and dizziness. These may be signs of a more serious problem. Your doctor may have recommended a follow-up visit in the next 8 to 12 hours. If you are not getting better, you may need more tests or treatment. The doctor has checked you carefully, but problems can develop later. If you notice any problems or new symptoms, get medical treatment right away. Follow-up care is a key part of your treatment and safety. Be sure to make and go to all appointments, and call your doctor if you are having problems. It's also a good idea to know your test results and keep a list of the medicines you take. How can you care for yourself at home? · Rest until you feel better. · To prevent dehydration, drink plenty of fluids, enough so that your urine is light yellow or clear like water. Choose water and other caffeine-free clear liquids until you feel better. If you have kidney, heart, or liver disease and have to limit fluids, talk with your doctor before you increase the amount of fluids you drink.   · If your stomach is upset, eat mild foods, such as rice, dry toast or crackers, bananas, and applesauce. Try eating several small meals instead of two or three large ones. · Wait until 48 hours after all symptoms have gone away before you have spicy foods, alcohol, and drinks that contain caffeine. · Do not eat foods that are high in fat. · Avoid anti-inflammatory medicines such as aspirin, ibuprofen (Advil, Motrin), and naproxen (Aleve). These can cause stomach upset. Talk to your doctor if you take daily aspirin for another health problem. When should you call for help? Call 911 anytime you think you may need emergency care. For example, call if:    · You passed out (lost consciousness).     · You pass maroon or very bloody stools.     · You vomit blood or what looks like coffee grounds.     · You have new, severe belly pain.    Call your doctor now or seek immediate medical care if:    · Your pain gets worse, especially if it becomes focused in one area of your belly.     · You have a new or higher fever.     · Your stools are black and look like tar, or they have streaks of blood.     · You have unexpected vaginal bleeding.     · You have symptoms of a urinary tract infection. These may include:  ¨ Pain when you urinate. ¨ Urinating more often than usual.  ¨ Blood in your urine.     · You are dizzy or lightheaded, or you feel like you may faint.    Watch closely for changes in your health, and be sure to contact your doctor if:    · You are not getting better after 1 day (24 hours). Where can you learn more? Go to http://shaquille-licha.info/. Enter O083 in the search box to learn more about \"Abdominal Pain: Care Instructions. \"  Current as of: November 20, 2017  Content Version: 11.7  © 8988-7000 [employer]. Care instructions adapted under license by [employer] (which disclaims liability or warranty for this information).  If you have questions about a medical condition or this instruction, always ask your healthcare professional. [place] [address] any warranty or liability for your use of this information.

## 2018-09-15 NOTE — ED NOTES
6: 07 AM :Pt care assumed from Dr. Frida Maguire , ED provider. Pt complaint(s), current treatment plan, progression and available diagnostic results have been discussed thoroughly. Rounding occurred: yes  Intended Disposition: TBD   Pending diagnostic reports and/or labs (please list): Awaiting US and Reevaluation     Pt still having some RUQ TTP, afebrile, no leukocytosis, normal LFTs, however US showing early cholecystitis with stones and sludge and pericholecystic fluid. Will consult surgery    9:48 AM  Surgery recommends given his normal labs and vs to treat pain and d/c and have him f/u in his clinic on Monday to schedule semi elective robotic lap shant. Pt having some recurrent pain, gave po oxy here and d/c with oxy and zofran and instructions to come back with worsening fever or pain. Had case management attempt to help him with ride to office, if he is unable to get there he will simply come back by EMS.  Pt okay with discharge    Jesse Newman MD

## 2018-09-15 NOTE — ED TRIAGE NOTES
Patient c/o abdominal pain mostly in the [address] since Tuesday that's worse when he lays down for bed.

## 2018-09-15 NOTE — ED NOTES
I have reviewed discharge instruction and prescriptions with patient at length. Patient verbalized understanding and has no further questions at this time. Education taught and patient verbalized understanding of education. Teach back method used. IV removed, catheter tip intact on removal.    Patients pain decreased at time of discharge. Belongings given to patient. Patient discharged with family to home. Patient verbalized he will not be driving today.

## 2018-09-15 NOTE — ED PROVIDER NOTES
EMERGENCY DEPARTMENT HISTORY AND PHYSICAL EXAM    1:27 AM      Date: 9/15/2018  Patient Name: Quentin Kramer    History of Presenting Illness     Chief Complaint   Patient presents with    Abdominal Pain         History Provided By: Patient    Chief Complaint: abd pain  Duration:  Days  Timing:  Intermittent  Location: diffuse  Severity: 8 out of 10  Modifying Factors: He says his sx are worse at night. Associated Symptoms: constipation and decreases appetite. Denies fever, HA, and cough. Additional History (Context): Quentin Kramer is a 78 y.o. male with No significant past medical history who presents with intermittent diffuse 8/10 abd pain that has been going on for the past few days. Associated sx are constipation and decreases appetite. Denies fever, HA, and cough. He says his sx are worse at night. He smokes and drinks. He says he drank a beer today. He has not had a real BM since Tuesday. PCP: Ludwin Golden MD        Past History     Past Medical History:  History reviewed. No pertinent past medical history. Past Surgical History:  Past Surgical History:   Procedure Laterality Date    HX CATARACT REMOVAL Left 01/24/2018       Family History:  History reviewed. No pertinent family history. Social History:  Social History   Substance Use Topics    Smoking status: Current Every Day Smoker     Packs/day: 0.50     Years: 45.00    Smokeless tobacco: Never Used    Alcohol use 2.4 oz/week     4 Cans of beer per week       Allergies:  No Known Allergies      Review of Systems     Review of Systems   Constitutional: Positive for appetite change. Negative for fever. Respiratory: Negative for cough. Gastrointestinal: Positive for abdominal pain and constipation. Neurological: Negative for headaches. All other systems reviewed and are negative.              Visit Vitals    /66    Pulse 60    Temp 98.5 °F (36.9 °C)    Resp 17    Ht 5' 9\" (1.753 m)    Wt 96.2 kg (212 lb)    SpO2 100%    BMI 31.31 kg/m2           Physical Exam / Medical Decision Making   I am the first provider for this patient. I reviewed the vital signs, available nursing notes, past medical history, past surgical history, family history and social history. Vital Signs-Reviewed the patient's vital signs. Physical exam:  General:  Well-developed, well-nourished, no apparent distress. Head:  Normocephalic atraumatic. Eyes:  Pupils midrange extraocular movements intact. No pallor or conjunctival injection. Nose:  No rhinorrhea, inspection grossly normal.    Ears:  Grossly normal to inspection, no discharge. Mouth:  Mucous membranes moist, no appreciable intraoral lesion. Neck/Back:  Trachea midline, no asymmetry. Chest:  Grossly normal inspection, symmetric chest rise. Pulmonary:  Clear to auscultation bilaterally no wheezes rhonchi or rales. Cardiovascular:  S1-S2 no murmurs rubs or gallops. Abdomen: Soft, minimal TTP LUQ and epigastric region, nondistended no guarding rebound or peritoneal signs. No CVA tenderness  Extremities:  Grossly normal to inspection, peripheral pulses intact  all 4 extremities  Neurologic:  Alert and oriented no appreciable focal neurologic deficit    Skin:  Warm and dry  Psychiatric:  Grossly normal mood and affect  Nursing note reviewed, vital signs reviewed. ED course:  Patient presents with abdominal pain epigastric and LEFT upper quadrant, was drinking Seagrams and prune juice. We'll rule out pancreatitis or liver injury, x-ray for constipation since she reports no bowel movement since Tuesday.   Less likely obstruction    Patient had persistent pain x-ray nonspecific bowel gas pattern will plan for CT    Labs unremarkable    CT: Cholelithiasis with some stranding recommends ultrasound, liver lesions recommends not emergent MRI    Patient reevaluated  RIGHT upper quadrant epigastric and LEFT lower quadrant will plan for ultrasound and monitor    It is now the end of my shift, I am still awaiting ultrasound, reevaluation. Patient will be signed out to the oncoming physician Dr. Mer Hassan at 0600. Disposition:    Pending      Portions of this chart were created with Dragon medical speech to text program.   Unrecognized errors may be present. Diagnostic Study Results     Labs -  Recent Results (from the past 12 hour(s))   CBC WITH AUTOMATED DIFF    Collection Time: 09/15/18  1:36 AM   Result Value Ref Range    WBC 5.5 4.6 - 13.2 K/uL    RBC 3.48 (L) 4.70 - 5.50 M/uL    HGB 7.9 (L) 13.0 - 16.0 g/dL    HCT 25.9 (L) 36.0 - 48.0 %    MCV 74.4 74.0 - 97.0 FL    MCH 22.7 (L) 24.0 - 34.0 PG    MCHC 30.5 (L) 31.0 - 37.0 g/dL    RDW 24.4 (H) 11.6 - 14.5 %    PLATELET 318 882 - 016 K/uL    NEUTROPHILS 48 40 - 73 %    LYMPHOCYTES 23 21 - 52 %    MONOCYTES 28 (H) 3 - 10 %    EOSINOPHILS 1 0 - 5 %    BASOPHILS 0 0 - 2 %    ABS. NEUTROPHILS 2.6 1.8 - 8.0 K/UL    ABS. LYMPHOCYTES 1.3 0.9 - 3.6 K/UL    ABS. MONOCYTES 1.5 (H) 0.05 - 1.2 K/UL    ABS. EOSINOPHILS 0.1 0.0 - 0.4 K/UL    ABS. BASOPHILS 0.0 0.0 - 0.1 K/UL    DF AUTOMATED     METABOLIC PANEL, COMPREHENSIVE    Collection Time: 09/15/18  1:36 AM   Result Value Ref Range    Sodium 137 136 - 145 mmol/L    Potassium 3.9 3.5 - 5.5 mmol/L    Chloride 103 100 - 108 mmol/L    CO2 25 21 - 32 mmol/L    Anion gap 9 3.0 - 18 mmol/L    Glucose 110 (H) 74 - 99 mg/dL    BUN 6 (L) 7.0 - 18 MG/DL    Creatinine 0.89 0.6 - 1.3 MG/DL    BUN/Creatinine ratio 7 (L) 12 - 20      GFR est AA >60 >60 ml/min/1.73m2    GFR est non-AA >60 >60 ml/min/1.73m2    Calcium 8.8 8.5 - 10.1 MG/DL    Bilirubin, total 0.5 0.2 - 1.0 MG/DL    ALT (SGPT) 16 16 - 61 U/L    AST (SGOT) 26 15 - 37 U/L    Alk.  phosphatase 71 45 - 117 U/L    Protein, total 7.7 6.4 - 8.2 g/dL    Albumin 3.1 (L) 3.4 - 5.0 g/dL    Globulin 4.6 (H) 2.0 - 4.0 g/dL    A-G Ratio 0.7 (L) 0.8 - 1.7     LIPASE    Collection Time: 09/15/18  1:36 AM   Result Value Ref Range Lipase 115 73 - 393 U/L   PROTHROMBIN TIME + INR    Collection Time: 09/15/18  1:36 AM   Result Value Ref Range    Prothrombin time 14.8 11.5 - 15.2 sec    INR 1.2 0.8 - 1.2         Radiologic Studies -   XR ABD FLAT/ ERECT    (Results Pending)   CT ABD PELV WO CONT    (Results Pending)   US ABD LTD    (Results Pending)       Diagnosis     Clinical Impression:   1. Abdominal pain, epigastric    2. Anemia, unspecified type            Follow-up Information     Follow up With Details Comments [address]  if unable to see your own PCP [address] [place] [address]  [phone]    [facility] EMERGENCY DEPT  As needed, If symptoms worsen [address] [phone]           Patient's Medications    No medications on file     _______________________________    Attestations:  [address] acting as a scribe for and in the presence of Piedad Velásquez MD      September 15, 2018 at 1:27 AM       Provider Attestation:      I personally performed the services described in the documentation, reviewed the documentation, as recorded by the scribe in my presence, and it accurately and completely records my words and actions.  September 15, 2018 at 1:27 AM - Piedad Velásquez MD    _______________________________

## 2018-09-19 ENCOUNTER — OFFICE VISIT (OUTPATIENT)
Dept: SURGERY | Age: 68
End: 2018-09-19

## 2018-09-19 VITALS
TEMPERATURE: 97.9 F | HEIGHT: 69 IN | DIASTOLIC BLOOD PRESSURE: 56 MMHG | BODY MASS INDEX: 29.27 KG/M2 | HEART RATE: 70 BPM | SYSTOLIC BLOOD PRESSURE: 107 MMHG | WEIGHT: 197.6 LBS | RESPIRATION RATE: 16 BRPM | OXYGEN SATURATION: 100 %

## 2018-09-19 DIAGNOSIS — K80.20 GALLSTONES: Primary | ICD-10-CM

## 2018-09-19 DIAGNOSIS — K81.9 CHOLECYSTITIS: ICD-10-CM

## 2018-09-19 DIAGNOSIS — R10.11 RIGHT UPPER QUADRANT ABDOMINAL PAIN: ICD-10-CM

## 2018-09-19 NOTE — PROGRESS NOTES
General Surgery Consult      Rekha Esposito  Admit date: (Not on file)    MRN: [id number]     : 1950     Age: 78 y.o. Attending Physician: Eva Fry MD, FACS      Subjective:     Bryce Howard is a 78 y.o. male with a history of abdominal pain. The patient has stated that his been having abdominal pain for more than 5 years. He said that the pain is localized in the epigastric and right upper quadrant. He had multiple attacks in the past but he did not seek any attention . However recently his pain was getting worse and he went to the emergency room where he was diagnosed with cholecystitis based on ultrasound findings and he was referred to me for surgical evaluation. The patient also has nausea but no vomiting, though in the chart it said that the patient has no nausea or vomiting. Patient has a also a history of constipation but no diarrhea. The patient  has not had jaundice, acholic stools or dark urine. Patient stated that he did not have any previous abdominal surgeries but he had a recent eye surgery. Patient Active Problem List    Diagnosis Date Noted    Alcohol intoxication ([facility] [address] 2018    Syncope 2018    Anemia 2018     History reviewed. No pertinent past medical history. Past Surgical History:   Procedure Laterality Date    HX CATARACT REMOVAL Left 2018      Social History   Substance Use Topics    Smoking status: Current Every Day Smoker     Packs/day: 0.50     Years: 45.00    Smokeless tobacco: Never Used    Alcohol use 2.4 oz/week     4 Cans of beer per week      History   Smoking Status    Current Every Day Smoker    Packs/day: 0.50    Years: 45.00   Smokeless Tobacco    Never Used     History reviewed. No pertinent family history. Current Outpatient Prescriptions   Medication Sig    oxyCODONE (OXYIR) 5 mg capsule Take 1 Cap by mouth every six (6) hours as needed. Max Daily Amount: 20 mg.      No current facility-administered medications for this visit. No Known Allergies     Review of Systems:  Constitutional: negative  Eyes: negative  Ears, Nose, Mouth, Throat, and Face: negative  Respiratory: negative  Cardiovascular: negative  Gastrointestinal: positive for nausea, constipation and abdominal pain  Genitourinary:negative  Integument/Breast: negative  Hematologic/Lymphatic: negative  Musculoskeletal:negative  Neurological: negative  Behavioral/Psychiatric: negative  Endocrine: negative  Allergic/Immunologic: negative    Objective:     Visit Vitals    /56 (BP 1 Location: Right arm, BP Patient Position: Sitting)    Pulse 70    Temp 97.9 °F (36.6 °C) (Oral)    Resp 16    Ht 5' 9\" (1.753 m)    Wt 89.6 kg (197 lb 9.6 oz)    SpO2 100%    BMI 29.18 kg/m2       Physical Exam:      General:  in no apparent distress, alert, oriented times 3 and anicteric   Eyes:  conjunctivae and sclerae normal, pupils equal, round, reactive to light   Throat & Neck: no erythema or exudates noted and neck supple and symmetrical; no palpable masses   Lungs:   clear to auscultation bilaterally   Heart:  Regular rate and rhythm   Abdomen:   rounded, soft, nontender except for right upper quadrant tenderness, nondistended, no masses or organomegaly. No abdominal wall hernias.    Extremities: extremities normal, atraumatic, no cyanosis or edema   Skin: Normal.         Imaging and Lab Review:     CBC:   Lab Results   Component Value Date/Time    WBC 5.5 09/15/2018 01:36 AM    RBC 3.48 (L) 09/15/2018 01:36 AM    HGB 7.9 (L) 09/15/2018 01:36 AM    HCT 25.9 (L) 09/15/2018 01:36 AM    PLATELET 840 37/37/2800 01:36 AM     BMP:   Lab Results   Component Value Date/Time    Glucose 110 (H) 09/15/2018 01:36 AM    Sodium 137 09/15/2018 01:36 AM    Potassium 3.9 09/15/2018 01:36 AM    Chloride 103 09/15/2018 01:36 AM    CO2 25 09/15/2018 01:36 AM    BUN 6 (L) 09/15/2018 01:36 AM    Creatinine 0.89 09/15/2018 01:36 AM    Calcium 8.8 09/15/2018 01:36 AM     CMP:  Lab Results   Component Value Date/Time    Glucose 110 (H) 09/15/2018 01:36 AM    Sodium 137 09/15/2018 01:36 AM    Potassium 3.9 09/15/2018 01:36 AM    Chloride 103 09/15/2018 01:36 AM    CO2 25 09/15/2018 01:36 AM    BUN 6 (L) 09/15/2018 01:36 AM    Creatinine 0.89 09/15/2018 01:36 AM    Calcium 8.8 09/15/2018 01:36 AM    Anion gap 9 09/15/2018 01:36 AM    BUN/Creatinine ratio 7 (L) 09/15/2018 01:36 AM    Alk. phosphatase 71 09/15/2018 01:36 AM    Protein, total 7.7 09/15/2018 01:36 AM    Albumin 3.1 (L) 09/15/2018 01:36 AM    Globulin 4.6 (H) 09/15/2018 01:36 AM    A-G Ratio 0.7 (L) 09/15/2018 01:36 AM       No results found for this or any previous visit (from the past 24 hour(s)). images and reports reviewed    Assessment:   Ángela Ivy is a 78 y.o. male is presenting with abdominal pain and a picture of chronic cholecystitis. I explained the indications for robotic cholecystectomy as well as the alternatives. I discussed the potential risks, including but not limited to bleeding, wound infection, trocar injuries, bile duct injury and leak, and also the possible need for conversion to open procedure. I also explained the firefly technology and how it allow us to visualize the biliary tree to avoid bile duct injury or leak. He indicates that he understands the risks, accepts and wishes to proceed. Plan:     1. Will schedule for robotic cholecystectomy with firefly (ICG) technology for identification of the biliary tree. 2. No heavy lifting (more than 15 pounds) for 2 weeks after the surgery. 3. Avoid constipation after the surgery (take stool softener).       Please call me if you have any questions (cell phone: [phone])     Signed By: Carmen Ruth MD     September 19, 2018

## 2018-09-19 NOTE — PATIENT INSTRUCTIONS
If you have any questions or concerns about today's appointment, the verbal and/or written instructions you were given for follow up care, please call our office at [phone]. [facility] [place]  [address]  [facility], [address]    [phone] office  [phone] fax      PATIENT PRE AND POST [address]   [address]  [phone]    Before Surgery Instructions:   1) You must have someone available to drive you to and from your procedure and stay with you for the first 24 hours. 2) It is very important that you have nothing to eat or drink after midnight the night before your surgery. This includes chewing gum or sucking on hard candy. Take only heart, blood pressure and cholesterol medications the morning of surgery with only a sip of water. 3) Please stop taking Plavix 10 days prior to your surgery. Stop taking Coumadin 5 days prior to your surgery. Stop taking all Aspirin or Aspirin containing products 7 days prior to your surgery. Stop taking Advil, Motrin, Aleve, and etc. 3 days prior to your surgery. 4) If you take any diabetic medications please consult with your primary care physician on how to take them on the day of your surgery  5) Please stop all Herbal products 2 weeks prior to your surgery. 6) Please arrive at the hospital 2 hours prior to your surgery, unless you have been otherwise instructed. Any required lab work, urine drug screen, x-rays/ekg will be performed upon arrival.  7) Patients having an operation on their colon will be given a separate instruction sheet on their Bowel Prep. 8) For any pre-operative work up check in at the main entrance to [facility], and then go to Patient Registration. These studies are done on a walk in basis they are open from 7:00am to 5:00pm Monday through Friday.   9) Please wash your surgical site the morning of your surgery with soap and water. 10) If you are of child bearing age you will have pregnancy test done the morning of your surgery as soon as you arrive. 11) You may be contacted to change your surgery time. At times this is necessary due to equipment or staffing needs. After Surgery Instructions: You will need to be seen in the office for a follow-up visit 7-14 days after your surgery. Please call after you have had the procedure to make this appointment. Unless otherwise instructed, you may remove your outer bandage and shower 48 hours after your surgery. If you develop a fever greater than 101, have any significant drainage, bleeding, swelling and/or pus of the wound. Please call our office immediately. Surgery Date and Time:  Thursday, October 4, 2018 at 9:45am    Please check in at [facility], enter through the Emergency Room entrance and go up to the second floor. Please check in by 7:45am the day of your surgery. You may contact Trista Souza with any questions at [phone].

## 2018-09-19 NOTE — MR AVS SNAPSHOT
[facility] 
 
 
 [address] [facility] [address] [address] 
[phone] Patient: Ruma Weaver MRN: [id number] PBU:[id number] Visit Information Date & Time Provider Department Dept. Phone Encounter #  
 9/19/2018 11:00 AM Hemal Araujo MD [facility] [phone] [id number] Your Appointments 10/17/2018  8:45 AM  
POST OP with Hemal Araujo MD  
[address]) Appt Note: 2 week post op [id number] [facility] [address] 222 [facility]  
  
   
 [address] [facility] [address] [place] [address] Upcoming Health Maintenance Date Due Hepatitis C Screening 1950 DTaP/Tdap/Td series (1 - Tdap) 12/30/1972 FOBT Q 1 YEAR AGE 50-75 12/30/2001 ZOSTER VACCINE AGE 60> 10/30/2011 GLAUCOMA SCREENING Q2Y 12/30/2016 Pneumococcal 65+ Low/Medium Risk (1 of 2 - PCV13) 12/30/2016 MEDICARE YEARLY EXAM 3/14/2018 Influenza Age 5 to Adult 8/1/2018 Allergies as of 9/19/2018  Review Complete On: 9/19/2018 By: Barrera Young LPN No Known Allergies Current Immunizations  Never Reviewed No immunizations on file. Not reviewed this visit You Were Diagnosed With   
  
 Codes Comments Gallstones    -  Primary ICD-10-CM: B08.36 ICD-9-CM: 574.20 Right upper quadrant abdominal pain     ICD-10-CM: R10.11 ICD-9-CM: 789.01 Cholecystitis     ICD-10-CM: K81.9 ICD-9-CM: 575.10 Vitals BP Pulse Temp Resp Height(growth percentile) Weight(growth percentile) 107/56 (BP 1 Location: Right arm, BP Patient Position: Sitting) 70 97.9 °F (36.6 °C) (Oral) 16 5' 9\" (1.753 m) 197 lb 9.6 oz (89.6 kg) SpO2 BMI Smoking Status 100% 29.18 kg/m2 Current Every Day Smoker BMI and BSA Data  Body Mass Index Body Surface Area  
 29.18 kg/m 2 2.09 m 2  
  
  
 Your Updated Medication List  
  
   
This list is accurate as of 9/19/18 11:14 AM.  Always use your most recent med list.  
  
  
  
  
 oxyCODONE 5 mg capsule Commonly known as:  OXYIR Take 1 Cap by mouth every six (6) hours as needed. Max Daily Amount: 20 mg. Patient Instructions If you have any questions or concerns about today's appointment, the verbal and/or written instructions you were given for follow up care, please call our office at [phone]. [facility] [address] [facility], [address] [place], [address] [place] [address] Road 
 
[phone] office [phone] fax PATIENT PRE AND POST OPERATIVE INSTRUCTIONS [address] [facility] [address] 
[phone] Before Surgery Instructions:  
1) You must have someone available to drive you to and from your procedure and stay with you for the first 24 hours. 2) It is very important that you have nothing to eat or drink after midnight the night before your surgery. This includes chewing gum or sucking on hard candy. Take only heart, blood pressure and cholesterol medications the morning of surgery with only a sip of water. 3) Please stop taking Plavix 10 days prior to your surgery. Stop taking Coumadin 5 days prior to your surgery. Stop taking all Aspirin or Aspirin containing products 7 days prior to your surgery. Stop taking Advil, Motrin, Aleve, and etc. 3 days prior to your surgery. 4) If you take any diabetic medications please consult with your primary care physician on how to take them on the day of your surgery 5) Please stop all Herbal products 2 weeks prior to your surgery. 6) Please arrive at the hospital 2 hours prior to your surgery, unless you have been otherwise instructed.  Any required lab work, urine drug screen, x-rays/ekg will be performed upon arrival. 
7) Patients having an operation on their colon will be given a separate instruction sheet on their Bowel Prep. 8) For any pre-operative work up check in at the main entrance to [facility], and then go to Patient Registration. These studies are done on a walk in basis they are open from 7:00am to 5:00pm Monday through Friday. 9) Please wash your surgical site the morning of your surgery with soap and water. 10) If you are of child bearing age you will have pregnancy test done the morning of your surgery as soon as you arrive. 11) You may be contacted to change your surgery time. At times this is necessary due to equipment or staffing needs. After Surgery Instructions: You will need to be seen in the office for a follow-up visit 7-14 days after your surgery. Please call after you have had the procedure to make this appointment. Unless otherwise instructed, you may remove your outer bandage and shower 48 hours after your surgery. If you develop a fever greater than 101, have any significant drainage, bleeding, swelling and/or pus of the wound. Please call our office immediately. Surgery Date and Time:  Thursday, October 4, 2018 at 9:45am 
 
Please check in at [facility], enter through the Emergency Room entrance and go up to the second floor. Please check in by 7:45am the day of your surgery. You may contact Melinda Greenberg with any questions at [phone]. Patient Instructions History Introducing [facility]! [facility] introduces WHOOP patient portal. Now you can access parts of your medical record, email your doctor's office, and request medication refills online. 1. In your internet browser, go to [email or web address] 2. Click on the First Time User? Click Here link in the Sign In box. You will see the New Member Sign Up page. 3. Enter your WHOOP Access Code exactly as it appears below. You will not need to use this code after youve completed the sign-up process.  If you do not sign up before the expiration date, you must request a new code. · Screen Tonic Access Code: [email or web address] Expires: 11/2/2018  1:48 PM 
 
4. Enter the last four digits of your Social Security Number (xxxx) and Date of Birth (mm/dd/yyyy) as indicated and click Submit. You will be taken to the next sign-up page. 5. Create a Screen Tonic ID. This will be your Screen Tonic login ID and cannot be changed, so think of one that is secure and easy to remember. 6. Create a Screen Tonic password. You can change your password at any time. 7. Enter your Password Reset Question and Answer. This can be used at a later time if you forget your password. 8. Enter your e-mail address. You will receive e-mail notification when new information is available in [address] 9. Click Sign Up. You can now view and download portions of your medical record. 10. Click the Download Summary menu link to download a portable copy of your medical information. If you have questions, please visit the Frequently Asked Questions section of the Screen Tonic website. Remember, Screen Tonic is NOT to be used for urgent needs. For medical emergencies, dial 911. Now available from your iPhone and Android! Please provide this summary of care documentation to your next provider. Your primary care clinician is listed as Phys Other. If you have any questions after today's visit, please call [phone].

## 2018-09-27 ENCOUNTER — TELEPHONE (OUTPATIENT)
Dept: SURGERY | Age: 68
End: 2018-09-27

## 2018-09-27 NOTE — TELEPHONE ENCOUNTER
Spoke with Mr. Rich Oviedo to reschedule surgery (multiport cholecystectomy) to Tuesday, October 2, 2018 at 10:00am with a 8:00am arrival.

## 2018-09-28 NOTE — PERIOP NOTES
PAT - SURGICAL PRE-ADMISSION INSTRUCTIONS    NAME:  Melia Hutchins                                                          TODAY'S DATE:  9/28/2018    SURGERY DATE:  10/2/2018                                  SURGERY ARRIVAL TIME:   0800    1. Do NOT eat or drink anything, including candy or gum, after MIDNIGHT on 10/01/18 , unless you have specific instructions from your Surgeon or Anesthesia Provider to do so. 2. No smoking on the day of surgery. 3. No alcohol 24 hours prior to the day of surgery. 4. No recreational drugs for one week prior to the day of surgery. 5. Leave all valuables, including money/purse, at home. 6. Remove all jewelry, nail polish, makeup (including mascara); no lotions, powders, deodorant, or perfume/cologne/after shave. 7. Glasses/Contact lenses and Dentures may be worn to the hospital.  They will be removed prior to surgery. 8. Call your doctor if symptoms of a cold or illness develop within 24 ours prior to surgery. 9. AN ADULT MUST DRIVE YOU HOME AFTER OUTPATIENT SURGERY. 10. If you are having an OUTPATIENT procedure, please make arrangements for a responsible adult to be with you for 24 hours after your surgery. 11. If you are admitted to the hospital, you will be assigned to a bed after surgery is complete. Normally a family member will not be able to see you until you are in your assigned bed. 15. Family is encouraged to accompany you to the hospital.  We ask visitors in the treatment area to be limited to ONE person at a time to ensure patient privacy. EXCEPTIONS WILL BE MADE AS NEEDED. 15. Children under 12 are discouraged from entering the treatment area and need to be supervised by an adult when in the waiting room. Special Instructions:    NONE. Patient Prep:    shower with anti-bacterial soap    These surgical instructions were reviewed with Leah during the PAT phone call. Directions: On the morning of surgery, please go to the [facility]. Enter the building from the [facility] entrance, 1st floor (next to the Emergency Room entrance). Take the elevator to the 2nd floor. Sign in at the Registration Desk.     If you have any questions and/or concerns, please do not hesitate to call:  (Prior to the day of surgery)  [facility] unit:  [phone]  (Day of surgery)  [facility] unit:  [phone]

## 2018-10-01 ENCOUNTER — ANESTHESIA EVENT (OUTPATIENT)
Dept: SURGERY | Age: 68
End: 2018-10-01
Payer: MEDICARE

## 2018-10-02 ENCOUNTER — ANESTHESIA (OUTPATIENT)
Dept: SURGERY | Age: 68
End: 2018-10-02
Payer: MEDICARE

## 2018-10-02 ENCOUNTER — HOSPITAL ENCOUNTER (OUTPATIENT)
Age: 68
Setting detail: OUTPATIENT SURGERY
Discharge: HOME | End: 2018-10-02
Attending: SURGERY | Admitting: SURGERY
Payer: MEDICARE

## 2018-10-02 VITALS
BODY MASS INDEX: 28.1 KG/M2 | OXYGEN SATURATION: 91 % | HEART RATE: 78 BPM | WEIGHT: 196.25 LBS | RESPIRATION RATE: 16 BRPM | SYSTOLIC BLOOD PRESSURE: 148 MMHG | HEIGHT: 70 IN | DIASTOLIC BLOOD PRESSURE: 81 MMHG | TEMPERATURE: 97 F

## 2018-10-02 DIAGNOSIS — Z90.49 S/P CHOLECYSTECTOMY: Primary | ICD-10-CM

## 2018-10-02 PROCEDURE — 77030011267 HC ELECTRD BLD COVD -A: Performed by: SURGERY

## 2018-10-02 PROCEDURE — 88304 TISSUE EXAM BY PATHOLOGIST: CPT | Performed by: SURGERY

## 2018-10-02 PROCEDURE — 74011250636 HC RX REV CODE- 250/636

## 2018-10-02 PROCEDURE — 77030008771 HC TU NG SALEM SUMP -A: Performed by: ANESTHESIOLOGY

## 2018-10-02 PROCEDURE — 77030020703 HC SEAL CANN DISP INTU -B: Performed by: SURGERY

## 2018-10-02 PROCEDURE — 74011250636 HC RX REV CODE- 250/636: Performed by: NURSE ANESTHETIST, CERTIFIED REGISTERED

## 2018-10-02 PROCEDURE — 77030002933 HC SUT MCRYL J&J -A: Performed by: SURGERY

## 2018-10-02 PROCEDURE — 77030035277 HC OBTRTR BLDELSS DISP INTU -B: Performed by: SURGERY

## 2018-10-02 PROCEDURE — 74011250637 HC RX REV CODE- 250/637: Performed by: NURSE ANESTHETIST, CERTIFIED REGISTERED

## 2018-10-02 PROCEDURE — 77030008477 HC STYL SATN SLP COVD -A: Performed by: ANESTHESIOLOGY

## 2018-10-02 PROCEDURE — 76010000934 HC OR TIME 1 TO 1.5HR INTENSV - TIER 2: Performed by: SURGERY

## 2018-10-02 PROCEDURE — 77030035045 HC TRCR ENDOSC VRSPRT BLDLSS COVD -B: Performed by: SURGERY

## 2018-10-02 PROCEDURE — 77030032490 HC SLV COMPR SCD KNE COVD -B: Performed by: SURGERY

## 2018-10-02 PROCEDURE — 74011250636 HC RX REV CODE- 250/636: Performed by: SURGERY

## 2018-10-02 PROCEDURE — 77030009852 HC PCH RTVR ENDOSC COVD -B: Performed by: SURGERY

## 2018-10-02 PROCEDURE — 77030010939 HC CLP LIG TELE -B: Performed by: SURGERY

## 2018-10-02 PROCEDURE — 76210000021 HC REC RM PH II 0.5 TO 1 HR: Performed by: SURGERY

## 2018-10-02 PROCEDURE — 76060000033 HC ANESTHESIA 1 TO 1.5 HR: Performed by: SURGERY

## 2018-10-02 PROCEDURE — 77030035048 HC TRCR ENDOSC OPTCL COVD -B: Performed by: SURGERY

## 2018-10-02 PROCEDURE — 76210000016 HC OR PH I REC 1 TO 1.5 HR: Performed by: SURGERY

## 2018-10-02 PROCEDURE — 77030018842 HC SOL IRR SOD CL 9% BAXT -A: Performed by: SURGERY

## 2018-10-02 PROCEDURE — 77030010507 HC ADH SKN DERMBND J&J -B: Performed by: SURGERY

## 2018-10-02 PROCEDURE — 74011000250 HC RX REV CODE- 250: Performed by: SURGERY

## 2018-10-02 PROCEDURE — 74011000272 HC RX REV CODE- 272: Performed by: SURGERY

## 2018-10-02 PROCEDURE — 77030008683 HC TU ET CUF COVD -A: Performed by: ANESTHESIOLOGY

## 2018-10-02 PROCEDURE — 77030038612 HC TU SUCT/IRR INTU -D: Performed by: SURGERY

## 2018-10-02 PROCEDURE — 74011000250 HC RX REV CODE- 250

## 2018-10-02 RX ORDER — PROPOFOL 10 MG/ML
INJECTION, EMULSION INTRAVENOUS AS NEEDED
Status: DISCONTINUED | OUTPATIENT
Start: 2018-10-02 | End: 2018-10-02 | Stop reason: HOSPADM

## 2018-10-02 RX ORDER — FENTANYL CITRATE 50 UG/ML
INJECTION, SOLUTION INTRAMUSCULAR; INTRAVENOUS AS NEEDED
Status: DISCONTINUED | OUTPATIENT
Start: 2018-10-02 | End: 2018-10-02 | Stop reason: HOSPADM

## 2018-10-02 RX ORDER — DIPHENHYDRAMINE HYDROCHLORIDE 50 MG/ML
12.5 INJECTION, SOLUTION INTRAMUSCULAR; INTRAVENOUS
Status: DISCONTINUED | OUTPATIENT
Start: 2018-10-02 | End: 2018-10-02 | Stop reason: HOSPADM

## 2018-10-02 RX ORDER — BUPIVACAINE HYDROCHLORIDE AND EPINEPHRINE 5; 5 MG/ML; UG/ML
INJECTION, SOLUTION EPIDURAL; INTRACAUDAL; PERINEURAL AS NEEDED
Status: DISCONTINUED | OUTPATIENT
Start: 2018-10-02 | End: 2018-10-02 | Stop reason: HOSPADM

## 2018-10-02 RX ORDER — SODIUM CHLORIDE, SODIUM LACTATE, POTASSIUM CHLORIDE, CALCIUM CHLORIDE 600; 310; 30; 20 MG/100ML; MG/100ML; MG/100ML; MG/100ML
50 INJECTION, SOLUTION INTRAVENOUS CONTINUOUS
Status: DISCONTINUED | OUTPATIENT
Start: 2018-10-02 | End: 2018-10-02 | Stop reason: HOSPADM

## 2018-10-02 RX ORDER — NEOSTIGMINE METHYLSULFATE 5 MG/5 ML
SYRINGE (ML) INTRAVENOUS AS NEEDED
Status: DISCONTINUED | OUTPATIENT
Start: 2018-10-02 | End: 2018-10-02 | Stop reason: HOSPADM

## 2018-10-02 RX ORDER — FENTANYL CITRATE 50 UG/ML
50 INJECTION, SOLUTION INTRAMUSCULAR; INTRAVENOUS
Status: DISCONTINUED | OUTPATIENT
Start: 2018-10-02 | End: 2018-10-02 | Stop reason: HOSPADM

## 2018-10-02 RX ORDER — NALOXONE HYDROCHLORIDE 0.4 MG/ML
0.1 INJECTION, SOLUTION INTRAMUSCULAR; INTRAVENOUS; SUBCUTANEOUS AS NEEDED
Status: DISCONTINUED | OUTPATIENT
Start: 2018-10-02 | End: 2018-10-02 | Stop reason: HOSPADM

## 2018-10-02 RX ORDER — ONDANSETRON 2 MG/ML
4 INJECTION INTRAMUSCULAR; INTRAVENOUS ONCE
Status: DISCONTINUED | OUTPATIENT
Start: 2018-10-02 | End: 2018-10-02 | Stop reason: HOSPADM

## 2018-10-02 RX ORDER — SODIUM CHLORIDE, SODIUM LACTATE, POTASSIUM CHLORIDE, CALCIUM CHLORIDE 600; 310; 30; 20 MG/100ML; MG/100ML; MG/100ML; MG/100ML
75 INJECTION, SOLUTION INTRAVENOUS CONTINUOUS
Status: DISCONTINUED | OUTPATIENT
Start: 2018-10-02 | End: 2018-10-02 | Stop reason: HOSPADM

## 2018-10-02 RX ORDER — ONDANSETRON 2 MG/ML
INJECTION INTRAMUSCULAR; INTRAVENOUS AS NEEDED
Status: DISCONTINUED | OUTPATIENT
Start: 2018-10-02 | End: 2018-10-02 | Stop reason: HOSPADM

## 2018-10-02 RX ORDER — VECURONIUM BROMIDE FOR INJECTION 1 MG/ML
INJECTION, POWDER, LYOPHILIZED, FOR SOLUTION INTRAVENOUS AS NEEDED
Status: DISCONTINUED | OUTPATIENT
Start: 2018-10-02 | End: 2018-10-02 | Stop reason: HOSPADM

## 2018-10-02 RX ORDER — MIDAZOLAM HYDROCHLORIDE 1 MG/ML
INJECTION, SOLUTION INTRAMUSCULAR; INTRAVENOUS AS NEEDED
Status: DISCONTINUED | OUTPATIENT
Start: 2018-10-02 | End: 2018-10-02 | Stop reason: HOSPADM

## 2018-10-02 RX ORDER — FENTANYL CITRATE 50 UG/ML
INJECTION, SOLUTION INTRAMUSCULAR; INTRAVENOUS
Status: COMPLETED
Start: 2018-10-02 | End: 2018-10-02

## 2018-10-02 RX ORDER — OXYCODONE AND ACETAMINOPHEN 5; 325 MG/1; MG/1
1 TABLET ORAL
Qty: 24 TAB | Refills: 0 | Status: SHIPPED | OUTPATIENT
Start: 2018-10-02 | End: 2019-04-06

## 2018-10-02 RX ORDER — CEFAZOLIN SODIUM 2 G/50ML
2 SOLUTION INTRAVENOUS
Status: COMPLETED | OUTPATIENT
Start: 2018-10-02 | End: 2018-10-02

## 2018-10-02 RX ORDER — INDOCYANINE GREEN AND WATER 25 MG
2.5 KIT INJECTION
Status: DISCONTINUED | OUTPATIENT
Start: 2018-10-02 | End: 2018-10-02 | Stop reason: HOSPADM

## 2018-10-02 RX ORDER — INSULIN LISPRO 100 [IU]/ML
INJECTION, SOLUTION INTRAVENOUS; SUBCUTANEOUS ONCE
Status: DISCONTINUED | OUTPATIENT
Start: 2018-10-02 | End: 2018-10-02 | Stop reason: HOSPADM

## 2018-10-02 RX ORDER — HYDROMORPHONE HYDROCHLORIDE 1 MG/ML
INJECTION, SOLUTION INTRAMUSCULAR; INTRAVENOUS; SUBCUTANEOUS AS NEEDED
Status: DISCONTINUED | OUTPATIENT
Start: 2018-10-02 | End: 2018-10-02 | Stop reason: HOSPADM

## 2018-10-02 RX ORDER — OXYCODONE AND ACETAMINOPHEN 5; 325 MG/1; MG/1
1 TABLET ORAL
Status: DISCONTINUED | OUTPATIENT
Start: 2018-10-02 | End: 2018-10-02 | Stop reason: HOSPADM

## 2018-10-02 RX ORDER — LIDOCAINE HYDROCHLORIDE 20 MG/ML
INJECTION, SOLUTION EPIDURAL; INFILTRATION; INTRACAUDAL; PERINEURAL AS NEEDED
Status: DISCONTINUED | OUTPATIENT
Start: 2018-10-02 | End: 2018-10-02 | Stop reason: HOSPADM

## 2018-10-02 RX ORDER — GLYCOPYRROLATE 0.2 MG/ML
INJECTION INTRAMUSCULAR; INTRAVENOUS AS NEEDED
Status: DISCONTINUED | OUTPATIENT
Start: 2018-10-02 | End: 2018-10-02 | Stop reason: HOSPADM

## 2018-10-02 RX ORDER — FAMOTIDINE 20 MG/1
20 TABLET, FILM COATED ORAL ONCE
Status: COMPLETED | OUTPATIENT
Start: 2018-10-02 | End: 2018-10-02

## 2018-10-02 RX ORDER — SUCCINYLCHOLINE CHLORIDE 20 MG/ML
INJECTION INTRAMUSCULAR; INTRAVENOUS AS NEEDED
Status: DISCONTINUED | OUTPATIENT
Start: 2018-10-02 | End: 2018-10-02 | Stop reason: HOSPADM

## 2018-10-02 RX ORDER — SODIUM CHLORIDE 0.9 % (FLUSH) 0.9 %
5-10 SYRINGE (ML) INJECTION AS NEEDED
Status: DISCONTINUED | OUTPATIENT
Start: 2018-10-02 | End: 2018-10-02 | Stop reason: HOSPADM

## 2018-10-02 RX ORDER — OXYCODONE HYDROCHLORIDE 5 MG/1
5 TABLET ORAL
Status: COMPLETED | OUTPATIENT
Start: 2018-10-02 | End: 2018-10-02

## 2018-10-02 RX ADMIN — LIDOCAINE HYDROCHLORIDE 100 MG: 20 INJECTION, SOLUTION EPIDURAL; INFILTRATION; INTRACAUDAL; PERINEURAL at 10:26

## 2018-10-02 RX ADMIN — OXYCODONE HYDROCHLORIDE 5 MG: 5 TABLET ORAL at 11:59

## 2018-10-02 RX ADMIN — FENTANYL CITRATE 50 MCG: 50 INJECTION, SOLUTION INTRAMUSCULAR; INTRAVENOUS at 10:17

## 2018-10-02 RX ADMIN — DIPHENHYDRAMINE HYDROCHLORIDE 12.5 MG: 50 INJECTION, SOLUTION INTRAMUSCULAR; INTRAVENOUS at 11:42

## 2018-10-02 RX ADMIN — Medication 3 MG: at 11:22

## 2018-10-02 RX ADMIN — FENTANYL CITRATE 50 MCG: 50 INJECTION, SOLUTION INTRAMUSCULAR; INTRAVENOUS at 12:10

## 2018-10-02 RX ADMIN — FENTANYL CITRATE 50 MCG: 50 INJECTION, SOLUTION INTRAMUSCULAR; INTRAVENOUS at 10:45

## 2018-10-02 RX ADMIN — FENTANYL CITRATE 50 MCG: 50 INJECTION, SOLUTION INTRAMUSCULAR; INTRAVENOUS at 10:24

## 2018-10-02 RX ADMIN — FENTANYL CITRATE 50 MCG: 50 INJECTION, SOLUTION INTRAMUSCULAR; INTRAVENOUS at 11:45

## 2018-10-02 RX ADMIN — MIDAZOLAM HYDROCHLORIDE 2 MG: 1 INJECTION, SOLUTION INTRAMUSCULAR; INTRAVENOUS at 10:17

## 2018-10-02 RX ADMIN — SUCCINYLCHOLINE CHLORIDE 100 MG: 20 INJECTION INTRAMUSCULAR; INTRAVENOUS at 10:26

## 2018-10-02 RX ADMIN — VECURONIUM BROMIDE FOR INJECTION 4 MG: 1 INJECTION, POWDER, LYOPHILIZED, FOR SOLUTION INTRAVENOUS at 10:35

## 2018-10-02 RX ADMIN — SODIUM CHLORIDE, SODIUM LACTATE, POTASSIUM CHLORIDE, AND CALCIUM CHLORIDE 50 ML/HR: 600; 310; 30; 20 INJECTION, SOLUTION INTRAVENOUS at 08:24

## 2018-10-02 RX ADMIN — GLYCOPYRROLATE 0.4 MG: 0.2 INJECTION INTRAMUSCULAR; INTRAVENOUS at 11:22

## 2018-10-02 RX ADMIN — CEFAZOLIN SODIUM 2 G: 2 SOLUTION INTRAVENOUS at 10:30

## 2018-10-02 RX ADMIN — PROPOFOL 200 MG: 10 INJECTION, EMULSION INTRAVENOUS at 10:26

## 2018-10-02 RX ADMIN — ONDANSETRON 4 MG: 2 INJECTION INTRAMUSCULAR; INTRAVENOUS at 11:00

## 2018-10-02 RX ADMIN — FAMOTIDINE 20 MG: 20 TABLET ORAL at 08:24

## 2018-10-02 RX ADMIN — FENTANYL CITRATE 50 MCG: 50 INJECTION, SOLUTION INTRAMUSCULAR; INTRAVENOUS at 11:26

## 2018-10-02 RX ADMIN — HYDROMORPHONE HYDROCHLORIDE 1 MG: 1 INJECTION, SOLUTION INTRAMUSCULAR; INTRAVENOUS; SUBCUTANEOUS at 11:27

## 2018-10-02 NOTE — IP AVS SNAPSHOT
Summary of Care Report The Summary of Care report has been created to help improve care coordination. Users with access to [employer] or [address] (Web-based application) may access additional patient information including the Discharge Summary. If you are not currently a [address] user and need more information, please call the number listed below in the [place] [address] section and ask to be connected with Medical Records. Facility Information Name Address Phone [address] [facility] [address] [facility] [address] [phone] Patient Information Patient Name Sex RABIA Ernst ([id number]) Male 1950 Discharge Information Admitting Provider Service Area Unit Marielle Ryan MD / [address] Phase 2 Recovery / [phone] Discharge Provider Discharge Date/Time Discharge Disposition Destination (none) 10/2/2018 Midday (Pending) AHR (none) Patient Language Language ENGLISH [13] Hospital Problems as of 10/2/2018  Reviewed: 10/2/2018  9:07 AM by Berenice Pallas, MD  
 None Non-Hospital Problems as of 10/2/2018  Reviewed: 10/2/2018  9:07 AM by Berenice Pallas, MD  
  
  
  
 Class Noted - Resolved Last Modified Active Problems Alcohol intoxication ([facility] [address]  2018 - Present 2018 by Brian Machado MD  
  Entered by Brian Machado MD  
  Syncope  2018 - Present 2018 by Brian Machado MD  
  Entered by Brian Machado MD  
  Anemia  2018 - Present 2018 by Brian Machado MD  
  Entered by Brian Machado MD  
  
You are allergic to the following No active allergies Current Discharge Medication List  
  
START taking these medications Dose & Instructions Dispensing Information Comments oxyCODONE-acetaminophen 5-325 mg per tablet Commonly known as:  PERCOCET Dose:  1 Tab Take 1 Tab by mouth every four (4) hours as needed for Pain. Max Daily Amount: 6 Tabs. Quantity:  24 Tab Refills:  0 CONTINUE these medications which have NOT CHANGED Dose & Instructions Dispensing Information Comments  
 oxyCODONE 5 mg capsule Commonly known as:  OXYIR Dose:  5 mg Take 1 Cap by mouth every six (6) hours as needed. Max Daily Amount: 20 mg.  
 Quantity:  15 Cap Refills:  0 Surgery Information ID Date/Time Status Primary Surgeon All Procedures Location [id number] 10/2/2018 0946 Unposted Maribell Oliver MD ROBOTIC CHOLECYSTECTOMY [facility] MAIN OR Follow-up Information Follow up With Details Comments Contact Info Ludwin Golden MD   Patient can only remember the practice name and not the physician Discharge Instructions Instructions Following Ambulatory Surgery Patient: Otho Kayser MRN: [id number]  SSN: [id number] YOB: 1950  Age: 78 y.o. Sex: male Activity · As tolerated, walking encourage, stairs are okay · Avoid strenuous activities - no lifting anything heavier than 15 pounds. · You may shower at home after 48 hours. Diet · Regular diet after nausea from the anesthetic has passed. Pain · Take pain medication as directed by your doctor ·  Call your doctor if pain is NOT relieved by medication After Anesthesia · For the first 24 hours: DO NOT Drive, Drink alcoholic beverages, or Make important decisions · Be aware of dizziness following anesthesia and while taking pain medication Call your doctor if 
· Excessive bleeding that does not stop after holding mild pressure over the area · Temperature of 101 degrees F or above · Redness,excessive swelling or bruising, and/or green or yellow, smelly discharge from incision · If nausea and vomiting continues Follow-Up Phone Calls · Call the office at [phone] to make your follow-up appointment Appointment date/time: 2 weeks after the surgery. Dr. Charli Goff cell phone number is [phone]. Please call me if you have any concerns or questions. DISCHARGE SUMMARY from Nurse PATIENT INSTRUCTIONS: 
 
After general anesthesia or intravenous sedation, for 24 hours or while taking prescription Narcotics: · Limit your activities · Do not drive and operate hazardous machinery · Do not make important personal or business decisions · Do  not drink alcoholic beverages · If you have not urinated within 8 hours after discharge, please contact your surgeon on call. Report the following to your surgeon: 
· Excessive pain, swelling, redness or odor of or around the surgical area · Temperature over 100.5 · Nausea and vomiting lasting longer than 4 hours or if unable to take medications · Any signs of decreased circulation or nerve impairment to extremity: change in color, persistent  numbness, tingling, coldness or increase pain · Any questions What to do at Home: 
Recommended activity: Activity as tolerated and no driving for today These are general instructions for a healthy lifestyle: No smoking/ No tobacco products/ Avoid exposure to second hand smoke Surgeon General's Warning:  Quitting smoking now greatly reduces serious risk to your health. Obesity, smoking, and sedentary lifestyle greatly increases your risk for illness A healthy diet, regular physical exercise & weight monitoring are important for maintaining a healthy lifestyle You may be retaining fluid if you have a history of heart failure or if you experience any of the following symptoms:  Weight gain of 3 pounds or more overnight or 5 pounds in a week, increased swelling in our hands or feet or shortness of breath while lying flat in bed.   Please call your doctor as soon as you notice any of these symptoms; do not wait until your next office visit. Recognize signs and symptoms of STROKE: 
 
F-face looks uneven A-arms unable to move or move unevenly S-speech slurred or non-existent T-time-call 911 as soon as signs and symptoms begin-DO NOT go Back to bed or wait to see if you get better-TIME IS BRAIN. Warning Signs of HEART ATTACK Call 911 if you have these symptoms: 
? Chest discomfort. Most heart attacks involve discomfort in the center of the chest that lasts more than a few minutes, or that goes away and comes back. It can feel like uncomfortable pressure, squeezing, fullness, or pain. ? Discomfort in other areas of the upper body. Symptoms can include pain or discomfort in one or both arms, the back, neck, jaw, or stomach. ? Shortness of breath with or without chest discomfort. ? Other signs may include breaking out in a cold sweat, nausea, or lightheadedness. Don't wait more than five minutes to call [address]! Fast action can save your life. Calling 911 is almost always the fastest way to get lifesaving treatment. Emergency Medical Services staff can begin treatment when they arrive  up to an hour sooner than if someone gets to the hospital by car. The discharge information has been reviewed with the patient. The patient verbalized understanding. Discharge medications reviewed with the patient and appropriate educational materials and side effects teaching were provided. Patient armband removed and given to patient to take home. Patient was informed of the privacy risks if armband lost or stolen. Chart Review Routing History No Routing History on File

## 2018-10-02 NOTE — DISCHARGE INSTRUCTIONS
Instructions Following Ambulatory Surgery    Patient: Kay Garcia MRN: [id number]  SSN: [id number]    YOB: 1950  Age: 78 y.o. Sex: male      Activity  · As tolerated, walking encourage, stairs are okay  · Avoid strenuous activities - no lifting anything heavier than 15 pounds. · You may shower at home after 48 hours. Diet  · Regular diet after nausea from the anesthetic has passed. Pain  · Take pain medication as directed by your doctor  ·  Call your doctor if pain is NOT relieved by medication    After Anesthesia  · For the first 24 hours: DO NOT Drive, Drink alcoholic beverages, or Make important decisions  · Be aware of dizziness following anesthesia and while taking pain medication    Call your doctor if  · Excessive bleeding that does not stop after holding mild pressure over the area  · Temperature of 101 degrees F or above  · Redness,excessive swelling or bruising, and/or green or yellow, smelly discharge from incision  · If nausea and vomiting continues    Follow-Up Phone Calls  · Call the office at [phone] to make your follow-up appointment    Appointment date/time: 2 weeks after the surgery. Dr. Rossi Tao cell phone number is [phone]. Please call me if you have any concerns or questions. DISCHARGE SUMMARY from Nurse    PATIENT INSTRUCTIONS:    After general anesthesia or intravenous sedation, for 24 hours or while taking prescription Narcotics:  · Limit your activities  · Do not drive and operate hazardous machinery  · Do not make important personal or business decisions  · Do  not drink alcoholic beverages  · If you have not urinated within 8 hours after discharge, please contact your surgeon on call.     Report the following to your surgeon:  · Excessive pain, swelling, redness or odor of or around the surgical area  · Temperature over 100.5  · Nausea and vomiting lasting longer than 4 hours or if unable to take medications  · Any signs of decreased circulation or nerve impairment to extremity: change in color, persistent  numbness, tingling, coldness or increase pain  · Any questions    What to do at Home:  Recommended activity: Activity as tolerated and no driving for today    These are general instructions for a healthy lifestyle:    No smoking/ No tobacco products/ Avoid exposure to second hand smoke  Surgeon General's Warning:  Quitting smoking now greatly reduces serious risk to your health. Obesity, smoking, and sedentary lifestyle greatly increases your risk for illness    A healthy diet, regular physical exercise & weight monitoring are important for maintaining a healthy lifestyle    You may be retaining fluid if you have a history of heart failure or if you experience any of the following symptoms:  Weight gain of 3 pounds or more overnight or 5 pounds in a week, increased swelling in our hands or feet or shortness of breath while lying flat in bed. Please call your doctor as soon as you notice any of these symptoms; do not wait until your next office visit. Recognize signs and symptoms of STROKE:    F-face looks uneven    A-arms unable to move or move unevenly    S-speech slurred or non-existent    T-time-call 911 as soon as signs and symptoms begin-DO NOT go       Back to bed or wait to see if you get better-TIME IS BRAIN. Warning Signs of HEART ATTACK     Call 911 if you have these symptoms:   Chest discomfort. Most heart attacks involve discomfort in the center of the chest that lasts more than a few minutes, or that goes away and comes back. It can feel like uncomfortable pressure, squeezing, fullness, or pain.  Discomfort in other areas of the upper body. Symptoms can include pain or discomfort in one or both arms, the back, neck, jaw, or stomach.  Shortness of breath with or without chest discomfort.  Other signs may include breaking out in a cold sweat, nausea, or lightheadedness.   Don't wait more than five minutes to call 911 - MINUTES MATTER! Fast action can save your life. Calling 911 is almost always the fastest way to get lifesaving treatment. Emergency Medical Services staff can begin treatment when they arrive -- up to an hour sooner than if someone gets to the hospital by car. The discharge information has been reviewed with the patient. The patient verbalized understanding. Discharge medications reviewed with the patient and appropriate educational materials and side effects teaching were provided. Patient armband removed and given to patient to take home. Patient was informed of the privacy risks if armband lost or stolen.

## 2018-10-02 NOTE — OP NOTES
[facility]  OPERATIVE REPORT    Renu Mccormack  MR#: [id number]  : 1950  ACCOUNT #: [de-identified]   DATE OF SERVICE: 10/02/2018    SURGEON:  Tessie Escamilla MD    ASSISTANT:  Henry Acosta. PREOPERATIVE DIAGNOSIS:  Acute chronic cholecystitis. POSTOPERATIVE DIAGNOSIS:  Chronic cholecystitis and liver cirrhosis. PROCEDURE PERFORMED:  Robotic cholecystectomy with Firefly to identify biliary tree. ANESTHESIA:  General.    COMPLICATIONS:  None. SPECIMENS REMOVED:  Gallbladder. ESTIMATED BLOOD LOSS:  Minimal.    IMPLANTS:  None. DETAILS OF PROCEDURE:  The patient was brought to operating room. Anesthesia was induced. Scrubbing and draping of the abdomen was done in the usual manner. A timeout was performed. A skin incision in the supraumbilical area was performed. Veress needle was inserted. Abdomen was insufflated. A 12 mm port was inserted. Abdomen was explored. There was liver cirrhosis. At this point, two other 8 mm ports were placed on the right and left-sided abdominal wall, and a 5 mm port was placed in the right upper quadrant. The robot was docked. At this point, the gallbladder was identified. It was thick and chronically inflamed with adhesions between the gallbladder and the omentum. These were taken down using blunt dissection. At this point, the cystic duct was dissected at the Calot triangle. However, there was significant adhesion and scarring. Firefly was used to identify the common bile duct at the junction with the cystic duct. It was dissected all the way around the cystic duct and then the cystic duct was clipped and divided. The cystic artery was also clipped and divided. The gallbladder was taken out from the liver bed using electrocautery. Hemostasis was secured. An EndoCatch was inserted and the gallbladder was placed inside the EndoCatch and was taken through the 12 mm port, and then the instruments were removed.   The robot was undocked and the skin incisions were closed with 4-0 Monocryl.       MD Naun Short  D: 10/02/2018 11:46     T: 10/02/2018 12:14  JOB #: [id number]

## 2018-10-02 NOTE — PROGRESS NOTES
Date of Surgery Update:  Aviva Laws was seen and examined. History and physical has been reviewed. The patient has been examined. There have been no significant clinical changes since the completion of the originally dated History and Physical. Will proceed with robotic cholecystectomy with firefly.      Signed By: Matthew Valerio MD     October 2, 2018 8:44 AM

## 2018-10-02 NOTE — PERIOP NOTES
1138 Pt received to PACU and connected to monitor. Bedside report given by Olu Farrell RN. Vital signs stable. Nurse at bedside. Will continue to monitor. 80 Called to update pt's family on current status.

## 2018-10-02 NOTE — BRIEF OP NOTE
BRIEF OPERATIVE NOTE    Date of Procedure: 10/2/2018   Preoperative Diagnosis: gallstones k80.20 ruq abd pain r10.11  Postoperative Diagnosis: gallstones k80.20 ruq abd pain r10.11    Procedure(s):  ROBOTIC CHOLECYSTECTOMY MULTIPORT  Surgeon(s) and Role:     * Colin Brady MD - Primary  Surgical Staff:  Circ-1: Rolanda Guadarrama RN  Circ-2: Speedy Marie RN  Scrub Tech-1: yrel Nephew  Surg Asst-1: Saul Aguirre  Event Time In   Incision Start 1036   Incision Close 1133     Anesthesia: General   Estimated Blood Loss: Minimal  Specimens:   ID Type Source Tests Collected by Time Destination   1 : gallbladder Preservative Gallbladder  Colin Brady MD 10/2/2018 1122 Pathology      Findings: Chronic cholecystitis. Liver cirrhosis. Complications: None.    Implants: * No implants in log *

## 2018-10-02 NOTE — PERIOP NOTES
Phase 2 Recovery Summary  Patient arrived to Phase 2 at 1250  Report received from [facility], RN  Vitals:    10/02/18 1229 10/02/18 1240 10/02/18 1241 10/02/18 1250   BP: 144/75   148/81   Pulse: 77 80 75 78   Resp: 17 19 17 16   Temp:       SpO2: 97% 96% 96% 91%   Weight:       Height:           oriented to time, place, person and situation    Lines and Drains  Atrial Line:      Wound  Wound Eye Left (Active)   Number of days:251       Wound Eye Right (Active)   Number of days:230       Wound Abdomen (Active)   DRESSING STATUS Clean, dry, and intact 10/2/2018  1:00 PM   DRESSING TYPE Topical skin adhesive/glue 10/2/2018  1:00 PM   Incision site well approximated?  Yes 10/2/2018 11:39 AM   Number of days:0              Patient discharged to home with DaughterHeather  at [facility]

## 2018-10-02 NOTE — IP AVS SNAPSHOT
[address] [address] Karen Winslow  
[phone] Patient: Melia Hutchins MRN: [id number] [facility]:[birth year] About your hospitalization You were admitted on:  October 2, 2018 You last received care in the:  [facility] PHASE 2 RECOVERY You were discharged on:  October 2, 2018 Why you were hospitalized Your primary diagnosis was:  Not on File Follow-up Information Follow up With Details Comments Contact Info Ludwin Golden MD   Patient can only remember the practice name and not the physician Your Scheduled Appointments Wednesday October 17, 2018  8:45 AM EDT  
POST OP with Renetta Soni MD  
[address] [facility] [address] [address] [id number] [phone] Discharge Orders None A check vickie indicates which time of day the medication should be taken. My Medications START taking these medications Instructions Each Dose to Equal  
 Morning Noon Evening Bedtime  
 oxyCODONE-acetaminophen 5-325 mg per tablet Commonly known as:  PERCOCET Your last dose was: Your next dose is: Take 1 Tab by mouth every four (4) hours as needed for Pain. Max Daily Amount: 6 Tabs. 1 Tab CONTINUE taking these medications Instructions Each Dose to Equal  
 Morning Noon Evening Bedtime  
 oxyCODONE 5 mg capsule Commonly known as:  OXYIR Your last dose was: Your next dose is: Take 1 Cap by mouth every six (6) hours as needed. Max Daily Amount: 20 mg.  
 5 mg Where to Get Your Medications Information on where to get these meds will be given to you by the nurse or doctor. ! Ask your nurse or doctor about these medications  
  oxyCODONE-acetaminophen 5-325 mg per tablet Opioid Education Prescription Opioids: What You Need to Know: 
 
Prescription opioids can be used to help relieve moderate-to-severe pain and are often prescribed following a surgery or injury, or for certain health conditions. These medications can be an important part of treatment but also come with serious risks. Opioids are strong pain medicines. Examples include hydrocodone, oxycodone, fentanyl, and morphine. Heroin is an example of an illegal opioid. It is important to work with your health care provider to make sure you are getting the safest, most effective care. WHAT ARE THE RISKS AND SIDE EFFECTS OF OPIOID USE? Prescription opioids carry serious risks of addiction and overdose, especially with prolonged use. An opioid overdose, often marked by slow breathing, can cause sudden death. The use of prescription opioids can have a number of side effects as well, even when taken as directed. · Tolerance-meaning you might need to take more of a medication for the same pain relief · Physical dependence-meaning you have symptoms of withdrawal when the medication is stopped. Withdrawal symptoms can include nausea, sweating, chills, diarrhea, stomach cramps, and muscle aches. Withdrawal can last up to several weeks, depending on which drug you took and how long you took it. · Increased sensitivity to pain · Constipation · Nausea, vomiting, and dry mouth · Sleepiness and dizziness · Confusion · Depression · Low levels of testosterone that can result in lower sex drive, energy, and strength · Itching and sweating RISKS ARE GREATER WITH:      
· History of drug misuse, substance use disorder, or overdose · Mental health conditions (such as depression or anxiety) · Sleep apnea · Older age (72 years or older) · Pregnancy Avoid alcohol while taking prescription opioids. Also, unless specifically advised by your health care provider, medications to avoid include: · Benzodiazepines (such as Xanax or Valium) · Muscle relaxants (such as Soma or Flexeril) · Hypnotics (such as Ambien or Lunesta) · Other prescription opioids KNOW YOUR OPTIONS Talk to your health care provider about ways to manage your pain that don't involve prescription opioids. Some of these options may actually work better and have fewer risks and side effects. Consult your physician before adding or stopping any medications, treatments, or physical activity. Options may include: 
· Pain relievers such as acetaminophen, ibuprofen, and naproxen · Some medications that are also used for depression or seizures · Physical therapy and exercise · Counseling to help patients learn how to cope better with triggers of pain and stress. · Application of heat or cold compress · Massage therapy · Relaxation techniques Be Informed Make sure you know the name of your medication, how much and how often to take it, and its potential risks & side effects. IF YOU ARE PRESCRIBED OPIOIDS FOR PAIN: 
· Never take opioids in greater amounts or more often than prescribed. Remember the goal is not to be pain-free but to manage your pain at a tolerable level. · Follow up with your primary care provider to: · Work together to create a plan on how to manage your pain. · Talk about ways to help manage your pain that don't involve prescription opioids. · Talk about any and all concerns and side effects. · Help prevent misuse and abuse. · Never sell or share prescription opioids · Help prevent misuse and abuse. · Store prescription opioids in a secure place and out of reach of others (this may include visitors, children, friends, and family). · Safely dispose of unused/unwanted prescription opioids: Find your community drug take-back program or your pharmacy mail-back program, or flush them down the toilet, following guidance from the Food and Drug Administration (www.fda.gov/Drugs/ResourcesForYou). · Visit www.cdc.gov/drugoverdose to learn about the risks of opioid abuse and overdose. · If you believe you may be struggling with addiction, tell your health care provider and ask for guidance or call Raj Odonnell at 2-492-663-OUEC. Discharge Instructions Instructions Following Ambulatory Surgery Patient: Bryan Vega MRN: [id number]  SSN: [id number] YOB: 1950  Age: 78 y.o. Sex: male Activity · As tolerated, walking encourage, stairs are okay · Avoid strenuous activities - no lifting anything heavier than 15 pounds. · You may shower at home after 48 hours. Diet · Regular diet after nausea from the anesthetic has passed. Pain · Take pain medication as directed by your doctor ·  Call your doctor if pain is NOT relieved by medication After Anesthesia · For the first 24 hours: DO NOT Drive, Drink alcoholic beverages, or Make important decisions · Be aware of dizziness following anesthesia and while taking pain medication Call your doctor if 
· Excessive bleeding that does not stop after holding mild pressure over the area · Temperature of 101 degrees F or above · Redness,excessive swelling or bruising, and/or green or yellow, smelly discharge from incision · If nausea and vomiting continues Follow-Up Phone Calls · Call the office at [phone] to make your follow-up appointment Appointment date/time: 2 weeks after the surgery. Dr. Rose  cell phone number is [phone]. Please call me if you have any concerns or questions. DISCHARGE SUMMARY from Nurse PATIENT INSTRUCTIONS: 
 
After general anesthesia or intravenous sedation, for 24 hours or while taking prescription Narcotics: · Limit your activities · Do not drive and operate hazardous machinery · Do not make important personal or business decisions · Do  not drink alcoholic beverages · If you have not urinated within 8 hours after discharge, please contact your surgeon on call. Report the following to your surgeon: 
· Excessive pain, swelling, redness or odor of or around the surgical area · Temperature over 100.5 · Nausea and vomiting lasting longer than 4 hours or if unable to take medications · Any signs of decreased circulation or nerve impairment to extremity: change in color, persistent  numbness, tingling, coldness or increase pain · Any questions What to do at Home: 
Recommended activity: Activity as tolerated and no driving for today These are general instructions for a healthy lifestyle: No smoking/ No tobacco products/ Avoid exposure to second hand smoke Surgeon General's Warning:  Quitting smoking now greatly reduces serious risk to your health. Obesity, smoking, and sedentary lifestyle greatly increases your risk for illness A healthy diet, regular physical exercise & weight monitoring are important for maintaining a healthy lifestyle You may be retaining fluid if you have a history of heart failure or if you experience any of the following symptoms:  Weight gain of 3 pounds or more overnight or 5 pounds in a week, increased swelling in our hands or feet or shortness of breath while lying flat in bed. Please call your doctor as soon as you notice any of these symptoms; do not wait until your next office visit. Recognize signs and symptoms of STROKE: 
 
F-face looks uneven A-arms unable to move or move unevenly S-speech slurred or non-existent T-time-call 911 as soon as signs and symptoms begin-DO NOT go Back to bed or wait to see if you get better-TIME IS BRAIN. Warning Signs of HEART ATTACK Call 911 if you have these symptoms: 
? Chest discomfort.  Most heart attacks involve discomfort in the center of the chest that lasts more than a few minutes, or that goes away and comes back. It can feel like uncomfortable pressure, squeezing, fullness, or pain. ? Discomfort in other areas of the upper body. Symptoms can include pain or discomfort in one or both arms, the back, neck, jaw, or stomach. ? Shortness of breath with or without chest discomfort. ? Other signs may include breaking out in a cold sweat, nausea, or lightheadedness. Don't wait more than five minutes to call [address]! Fast action can save your life. Calling 911 is almost always the fastest way to get lifesaving treatment. Emergency Medical Services staff can begin treatment when they arrive  up to an hour sooner than if someone gets to the hospital by car. The discharge information has been reviewed with the patient. The patient verbalized understanding. Discharge medications reviewed with the patient and appropriate educational materials and side effects teaching were provided. Patient armband removed and given to patient to take home. Patient was informed of the privacy risks if armband lost or stolen. Introducing [facility]! [facility] introduces [employer] patient portal. Now you can access parts of your medical record, email your doctor's office, and request medication refills online. 1. In your internet browser, go to [email or web address] 2. Click on the First Time User? Click Here link in the Sign In box. You will see the New Member Sign Up page. 3. Enter your [employer] Access Code exactly as it appears below. You will not need to use this code after youve completed the sign-up process. If you do not sign up before the expiration date, you must request a new code. · [employer] Access Code: [email or web address] Expires: 11/2/2018  1:48 PM 
 
4. Enter the last four digits of your Social Security Number (xxxx) and Date of Birth (mm/dd/yyyy) as indicated and click Submit. You will be taken to the next sign-up page. 5. Create a Business Exchange ID. This will be your Business Exchange login ID and cannot be changed, so think of one that is secure and easy to remember. 6. Create a Business Exchange password. You can change your password at any time. 7. Enter your Password Reset Question and Answer. This can be used at a later time if you forget your password. 8. Enter your e-mail address. You will receive e-mail notification when new information is available in [facility] 9. Click Sign Up. You can now view and download portions of your medical record. 10. Click the Download Summary menu link to download a portable copy of your medical information. If you have questions, please visit the Frequently Asked Questions section of the Business Exchange website. Remember, Business Exchange is NOT to be used for urgent needs. For medical emergencies, dial 911. Now available from your iPhone and Android! Introducing Elver Ortega As a [facility] patient, I wanted to make you aware of our electronic visit tool called Elver Edisiobhandustin. [facility] 24/7 allows you to connect within minutes with a medical provider 24 hours a day, seven days a week via a mobile device or tablet or logging into a secure website from your computer. You can access Elver Ortega from anywhere in the [place]. A virtual visit might be right for you when you have a simple condition and feel like you just dont want to get out of bed, or cant get away from work for an appointment, when your regular [facility] provider is not available (evenings, weekends or holidays), or when youre out of town and need minor care. Electronic visits cost only $49 and if the [facility] 24/7 provider determines a prescription is needed to treat your condition, one can be electronically transmitted to a nearby pharmacy*. Please take a moment to enroll today if you have not already done so. The enrollment process is free and takes just a few minutes.   To enroll, please download the [employer] 24/7 roscoe to your tablet or phone, or visit [email or web address] to enroll on your computer. And, as an [address] [place] [address] patient with a [employer] account, the results of your visits will be scanned into your electronic medical record and your primary care provider will be able to view the scanned results. We urge you to continue to see your regular [facility] provider for your ongoing medical care. And while your primary care provider may not be the one available when you seek a [employer] virtual visit, the peace of mind you get from getting a real diagnosis real time can be priceless. For more information on [employer], view our Frequently Asked Questions (FAQs) at [email or web address]. Sincerely, 
 
Slime Lama MD 
Chief Medical Officer Mel Daisy Craig *:  certain medications cannot be prescribed via [employer] Providers Seen During Your Hospitalization Provider Specialty Primary office phone Minh Odonnell MD Surgery [phone] Your Primary Care Physician (PCP) Primary Care Physician Office Phone Office Fax OTHER, PHYS ** None ** ** None ** You are allergic to the following No active allergies Recent Documentation Height Weight BMI Smoking Status 1.765 m 89 kg 28.57 kg/m2 Current Every Day Smoker Emergency Contacts Name Discharge Info Relation Home Work Mobile Leah Arndt DISCHARGE CAREGIVER [3] Spouse [3]   [phone] Patient Belongings The following personal items are in your possession at time of discharge: 
  Dental Appliances: None  Visual Aid: None      Home Medications: None   Jewelry: None  Clothing: Shirt, Undergarments, Pants, Footwear, Hat    Other Valuables: [employer] Please provide this summary of care documentation to your next provider. Signatures-by signing, you are acknowledging that this After Visit Summary has been reviewed with you and you have received a copy. Patient Signature:  ____________________________________________________________ Date:  ____________________________________________________________  
  
Larry Stake Provider Signature:  ____________________________________________________________ Date:  ____________________________________________________________

## 2018-10-02 NOTE — ANESTHESIA PREPROCEDURE EVALUATION
Anesthetic History               Review of Systems / Medical History  Patient summary reviewed and pertinent labs reviewed    Pulmonary          Smoker         Neuro/Psych   Within defined limits           Cardiovascular  Within defined limits                Exercise tolerance: >4 METS  Comments: Alcohol dependence   GI/Hepatic/Renal  Within defined limits              Endo/Other  Within defined limits           Other Findings   Comments:                Physical Exam    Airway  Mallampati: II  TM Distance: 4 - 6 cm  Neck ROM: normal range of motion   Mouth opening: Normal     Cardiovascular  Regular rate and rhythm,  S1 and S2 normal,  no murmur, click, rub, or gallop  Rhythm: regular  Rate: normal         Dental    Dentition: Poor dentition  Comments: The patient has very poor dentition. Loose, broken, and or missing teeth. I explained the risk of dental damage and or loss. The patient understands and accepts these risks.      Pulmonary  Breath sounds clear to auscultation               Abdominal  GI exam deferred       Other Findings            Anesthetic Plan    ASA: 3  Anesthesia type: general          Induction: Intravenous  Anesthetic plan and risks discussed with: Patient

## 2018-10-02 NOTE — ANESTHESIA POSTPROCEDURE EVALUATION
Post-Anesthesia Evaluation and Assessment    Patient: Jak Quiñones MRN: [id number]  SSN: [id number]    YOB: 1950  Age: 78 y.o. Sex: male      Data from PACU flowsheet    Cardiovascular Function/Vital Signs  Visit Vitals    /78    Pulse 83    Temp 36.1 °C (97 °F)    Resp 18    Ht 5' 9.5\" (1.765 m)    Wt 89 kg (196 lb 4 oz)    SpO2 95%    BMI 28.57 kg/m2       Patient is status post general anesthesia for Procedure(s):  ROBOTIC CHOLECYSTECTOMY MULTIPORT. Nausea/Vomiting: controlled    Postoperative hydration reviewed and adequate. Pain:  Pain Scale 1: Visual (10/02/18 1206)  Pain Intensity 1: 0 (10/02/18 1206)   Managed      Mental Status and Level of Consciousness: Alert and oriented     Pulmonary Status:   O2 Device: Room air (10/02/18 1148)   Adequate oxygenation and airway patent    Complications related to anesthesia: None    Post-anesthesia assessment completed.  No concerns    Signed By: Pedro Gomez MD     October 2, 2018

## 2018-10-09 ENCOUNTER — TELEPHONE (OUTPATIENT)
Dept: SURGERY | Age: 68
End: 2018-10-09

## 2018-10-09 NOTE — TELEPHONE ENCOUNTER
Patient called with concerns of firmness at the umbilicus. Patient states \"its hard as a rock just about my belly button\". I inquired about the incisional sites and he states there is no redness or swelling and that they are not bothersome nor painful. He is just concerned with it being firm. I reassured him and stated that some firmness would be expected and also asked about level of activity, Patient verified that he is resting and not doing anything physically laboring. I asked if patient had any further questions or concerns and encouraged him to call if any developed. Call was ended.

## 2018-11-12 ENCOUNTER — OFFICE VISIT (OUTPATIENT)
Dept: SURGERY | Age: 68
End: 2018-11-12

## 2018-11-12 VITALS
HEART RATE: 83 BPM | WEIGHT: 202.2 LBS | OXYGEN SATURATION: 100 % | TEMPERATURE: 99 F | DIASTOLIC BLOOD PRESSURE: 62 MMHG | HEIGHT: 69 IN | SYSTOLIC BLOOD PRESSURE: 115 MMHG | RESPIRATION RATE: 22 BRPM | BODY MASS INDEX: 29.95 KG/M2

## 2018-11-12 DIAGNOSIS — Z09 POSTOPERATIVE EXAMINATION: Primary | ICD-10-CM

## 2018-11-12 NOTE — PROGRESS NOTES
Patient seen and examined. He is doing relatively well. However he is stating that since the surgery, he has been having some shortness of breath. He said that this is minimal and only happen on exertion. He denies currently any shortness of breath, fever or chills. His abdomen is soft and non tender. His wounds are healing well. His pathology showed chronic and acute cholecystitis. I explained to the patient that he will need to see his primary care physician as soon as possible. He stated that he will call their office today and make an appointment for tomorrow. I do not see a reason to send the patient to the emergency room for his shortness of breath. Follow-up with me as needed.

## 2018-11-12 NOTE — PROGRESS NOTES
Chief Complaint   Patient presents with    Post OP Follow Up     post op cholecystectomy 10.2. 18.  C/O SOB since surgery 10.2. 18. Denies pain. 1. Have you been to the ER, urgent care clinic since your last visit? Hospitalized since your last visit? No    2. Have you seen or consulted any other health care providers outside of the [address] [place] [address] since your last visit? Include any pap smears or colon screening.  No

## 2019-04-06 ENCOUNTER — APPOINTMENT (OUTPATIENT)
Dept: GENERAL RADIOLOGY | Age: 69
End: 2019-04-06
Attending: EMERGENCY MEDICINE
Payer: MEDICAID

## 2019-04-06 ENCOUNTER — HOSPITAL ENCOUNTER (EMERGENCY)
Age: 69
Discharge: HOME | End: 2019-04-07
Attending: EMERGENCY MEDICINE | Admitting: EMERGENCY MEDICINE
Payer: MEDICAID

## 2019-04-06 DIAGNOSIS — M79.602 LEFT ARM PAIN: Primary | ICD-10-CM

## 2019-04-06 DIAGNOSIS — R06.02 SOB (SHORTNESS OF BREATH): ICD-10-CM

## 2019-04-06 PROCEDURE — 84484 ASSAY OF TROPONIN QUANT: CPT

## 2019-04-06 PROCEDURE — 93005 ELECTROCARDIOGRAM TRACING: CPT

## 2019-04-06 PROCEDURE — 99284 EMERGENCY DEPT VISIT MOD MDM: CPT

## 2019-04-06 PROCEDURE — 71045 X-RAY EXAM CHEST 1 VIEW: CPT

## 2019-04-07 VITALS
BODY MASS INDEX: 30.35 KG/M2 | HEART RATE: 97 BPM | RESPIRATION RATE: 18 BRPM | SYSTOLIC BLOOD PRESSURE: 110 MMHG | WEIGHT: 212 LBS | OXYGEN SATURATION: 97 % | TEMPERATURE: 98.4 F | HEIGHT: 70 IN | DIASTOLIC BLOOD PRESSURE: 68 MMHG

## 2019-04-07 LAB
ATRIAL RATE: 96 BPM
CALCULATED P AXIS, ECG09: 18 DEGREES
CALCULATED R AXIS, ECG10: 21 DEGREES
CALCULATED T AXIS, ECG11: 14 DEGREES
DIAGNOSIS, 93000: NORMAL
P-R INTERVAL, ECG05: 108 MS
Q-T INTERVAL, ECG07: 358 MS
QRS DURATION, ECG06: 96 MS
QTC CALCULATION (BEZET), ECG08: 452 MS
TROPONIN I SERPL-MCNC: <0.02 NG/ML (ref 0–0.04)
VENTRICULAR RATE, ECG03: 96 BPM
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
## 2019-04-07 NOTE — ED NOTES
11:09 AM  Call from radiology for radiology over read on chest x-ray questionable possible small pneumothorax phone call to patient was attempted however no response and no phone mail system was set up. Will attempt second call.    1:21 PM  Second call attempted no and answers, voicemail is not set up.   Postcard sent

## 2019-04-07 NOTE — ED TRIAGE NOTES
Left arm numbness for a month off an on  Shortness of breath when walking across the street to the store

## 2019-04-07 NOTE — ED NOTES
Patient c/o left arm pain and numbness x 1 month. Also reports SOB after walking short distances. Pain in arm increases when lying down.

## 2019-04-13 NOTE — ED NOTES
PROGRESS NOTE:    Pt called the ED to ask about his letter. He was told to come back into the ED for new CXR. Pt will try to come back today, but is not sure if he can. When her returns, please order new CXR for eval of potential; pneumothorax.   Charisse Butler NP  5:51 PM

## 2019-04-16 ENCOUNTER — HOSPITAL ENCOUNTER (EMERGENCY)
Age: 69
Discharge: HOME | End: 2019-04-16
Attending: EMERGENCY MEDICINE | Admitting: EMERGENCY MEDICINE
Payer: MEDICAID

## 2019-04-16 ENCOUNTER — APPOINTMENT (OUTPATIENT)
Dept: GENERAL RADIOLOGY | Age: 69
End: 2019-04-16
Attending: EMERGENCY MEDICINE
Payer: MEDICAID

## 2019-04-16 VITALS
TEMPERATURE: 98.6 F | HEART RATE: 74 BPM | RESPIRATION RATE: 19 BRPM | DIASTOLIC BLOOD PRESSURE: 67 MMHG | SYSTOLIC BLOOD PRESSURE: 131 MMHG | OXYGEN SATURATION: 99 %

## 2019-04-16 DIAGNOSIS — F10.10 ALCOHOL ABUSE: ICD-10-CM

## 2019-04-16 DIAGNOSIS — D64.9 ANEMIA, UNSPECIFIED TYPE: Primary | ICD-10-CM

## 2019-04-16 LAB
ALBUMIN SERPL-MCNC: 3.5 G/DL (ref 3.4–5)
ALBUMIN/GLOB SERPL: 0.9 {RATIO} (ref 0.8–1.7)
ALP SERPL-CCNC: 84 U/L (ref 45–117)
ALT SERPL-CCNC: 21 U/L (ref 16–61)
ANION GAP BLD CALC-SCNC: 17 MMOL/L (ref 10–20)
ANION GAP BLD CALC-SCNC: 19 MMOL/L (ref 10–20)
ANION GAP SERPL CALC-SCNC: 7 MMOL/L (ref 3–18)
APTT PPP: 25 SEC (ref 23–36.4)
AST SERPL-CCNC: 27 U/L (ref 15–37)
ATRIAL RATE: 78 BPM
BASOPHILS # BLD: 0 K/UL (ref 0–0.1)
BASOPHILS NFR BLD: 0 % (ref 0–3)
BILIRUB SERPL-MCNC: 0.3 MG/DL (ref 0.2–1)
BUN BLD-MCNC: 5 MG/DL (ref 7–18)
BUN BLD-MCNC: 7 MG/DL (ref 7–18)
BUN SERPL-MCNC: 7 MG/DL (ref 7–18)
BUN/CREAT SERPL: 8 (ref 12–20)
CA-I BLD-MCNC: 1.2 MMOL/L (ref 1.12–1.32)
CA-I BLD-MCNC: 1.22 MMOL/L (ref 1.12–1.32)
CALCIUM SERPL-MCNC: 8.7 MG/DL (ref 8.5–10.1)
CALCULATED P AXIS, ECG09: 61 DEGREES
CALCULATED R AXIS, ECG10: 42 DEGREES
CALCULATED T AXIS, ECG11: 44 DEGREES
CHLORIDE BLD-SCNC: 105 MMOL/L (ref 100–108)
CHLORIDE BLD-SCNC: 106 MMOL/L (ref 100–108)
CHLORIDE SERPL-SCNC: 108 MMOL/L (ref 100–108)
CO2 BLD-SCNC: 21 MMOL/L (ref 19–24)
CO2 BLD-SCNC: 25 MMOL/L (ref 19–24)
CO2 SERPL-SCNC: 25 MMOL/L (ref 21–32)
CREAT SERPL-MCNC: 0.93 MG/DL (ref 0.6–1.3)
CREAT UR-MCNC: 0.8 MG/DL (ref 0.6–1.3)
CREAT UR-MCNC: 0.9 MG/DL (ref 0.6–1.3)
DIAGNOSIS, 93000: NORMAL
DIFFERENTIAL METHOD BLD: ABNORMAL
EOSINOPHIL # BLD: 0.1 K/UL (ref 0–0.4)
EOSINOPHIL NFR BLD: 1 % (ref 0–5)
ERYTHROCYTE [DISTWIDTH] IN BLOOD BY AUTOMATED COUNT: 26.5 % (ref 11.6–14.5)
ETHANOL SERPL-MCNC: 44 MG/DL (ref 0–3)
GLOBULIN SER CALC-MCNC: 4 G/DL (ref 2–4)
GLUCOSE BLD STRIP.AUTO-MCNC: 97 MG/DL (ref 74–106)
GLUCOSE BLD STRIP.AUTO-MCNC: 99 MG/DL (ref 74–106)
GLUCOSE SERPL-MCNC: 88 MG/DL (ref 74–99)
HCT VFR BLD AUTO: 15.3 % (ref 36–48)
HCT VFR BLD CALC: 20 % (ref 36–49)
HCT VFR BLD CALC: 21 % (ref 36–49)
HGB BLD-MCNC: 4.1 G/DL (ref 13–16)
HGB BLD-MCNC: 6.8 G/DL (ref 12–16)
HGB BLD-MCNC: 7.1 G/DL (ref 12–16)
INR PPP: 1.3 (ref 0.8–1.2)
LYMPHOCYTES # BLD: 1 K/UL (ref 0.8–3.5)
LYMPHOCYTES NFR BLD: 20 % (ref 20–51)
MCH RBC QN AUTO: 19 PG (ref 24–34)
MCHC RBC AUTO-ENTMCNC: 26.8 G/DL (ref 31–37)
MCV RBC AUTO: 70.8 FL (ref 74–97)
MONOCYTES # BLD: 1.1 K/UL (ref 0–1)
MONOCYTES NFR BLD: 21 % (ref 2–9)
NEUTS SEG # BLD: 2.8 K/UL (ref 1.8–8)
NEUTS SEG NFR BLD: 58 % (ref 42–75)
P-R INTERVAL, ECG05: 156 MS
PLATELET # BLD AUTO: 451 K/UL (ref 135–420)
PMV BLD AUTO: 9.3 FL (ref 9.2–11.8)
POTASSIUM BLD-SCNC: 4 MMOL/L (ref 3.5–5.5)
POTASSIUM BLD-SCNC: 4.2 MMOL/L (ref 3.5–5.5)
POTASSIUM SERPL-SCNC: 4.2 MMOL/L (ref 3.5–5.5)
PROT SERPL-MCNC: 7.5 G/DL (ref 6.4–8.2)
PROTHROMBIN TIME: 16.1 SEC (ref 11.5–15.2)
Q-T INTERVAL, ECG07: 362 MS
QRS DURATION, ECG06: 88 MS
QTC CALCULATION (BEZET), ECG08: 412 MS
RBC # BLD AUTO: 2.16 M/UL (ref 4.7–5.5)
RBC MORPH BLD: ABNORMAL
SODIUM BLD-SCNC: 141 MMOL/L (ref 136–145)
SODIUM BLD-SCNC: 142 MMOL/L (ref 136–145)
SODIUM SERPL-SCNC: 140 MMOL/L (ref 136–145)
TROPONIN I SERPL-MCNC: <0.02 NG/ML (ref 0–0.04)
VENTRICULAR RATE, ECG03: 78 BPM
WBC # BLD AUTO: 5 K/UL (ref 4.6–13.2)

## 2019-04-16 PROCEDURE — 77030013169 SET IV BLD ICUM -A

## 2019-04-16 PROCEDURE — 86923 COMPATIBILITY TEST ELECTRIC: CPT

## 2019-04-16 PROCEDURE — 99285 EMERGENCY DEPT VISIT HI MDM: CPT

## 2019-04-16 PROCEDURE — 84484 ASSAY OF TROPONIN QUANT: CPT

## 2019-04-16 PROCEDURE — 36430 TRANSFUSION BLD/BLD COMPNT: CPT

## 2019-04-16 PROCEDURE — 86900 BLOOD TYPING SEROLOGIC ABO: CPT

## 2019-04-16 PROCEDURE — 85730 THROMBOPLASTIN TIME PARTIAL: CPT

## 2019-04-16 PROCEDURE — 71046 X-RAY EXAM CHEST 2 VIEWS: CPT

## 2019-04-16 PROCEDURE — 85610 PROTHROMBIN TIME: CPT

## 2019-04-16 PROCEDURE — 80307 DRUG TEST PRSMV CHEM ANLYZR: CPT

## 2019-04-16 PROCEDURE — 80047 BASIC METABLC PNL IONIZED CA: CPT

## 2019-04-16 PROCEDURE — 85025 COMPLETE CBC W/AUTO DIFF WBC: CPT

## 2019-04-16 PROCEDURE — 80053 COMPREHEN METABOLIC PANEL: CPT

## 2019-04-16 PROCEDURE — 93005 ELECTROCARDIOGRAM TRACING: CPT

## 2019-04-16 PROCEDURE — P9016 RBC LEUKOCYTES REDUCED: HCPCS

## 2019-04-16 RX ORDER — SODIUM CHLORIDE 9 MG/ML
250 INJECTION, SOLUTION INTRAVENOUS AS NEEDED
Status: DISCONTINUED | OUTPATIENT
Start: 2019-04-16 | End: 2019-04-17 | Stop reason: HOSPADM

## 2019-04-16 NOTE — ED NOTES
Bedside report given to [facility], patient resting I the bed, on the cardiac monitor, blood transfusing. Plan is for patient to return home after follow up labs.

## 2019-04-16 NOTE — ED TRIAGE NOTES
Patient was seen on 4/6/2019 with possible tear in lung. Has been having syncopal episodes and SOB when walking up stair cases. Cough noted. Remains alert and oriented.

## 2019-04-16 NOTE — ED PROVIDER NOTES
EMERGENCY DEPARTMENT HISTORY AND PHYSICAL EXAM    9:34 AM      Date: 4/16/2019  Patient Name: Kayla Milian    History of Presenting Illness     Chief Complaint   Patient presents with    Chest Pain         History Provided By: Patient      Additional History (Context): Kayla Milian is a 80 y.o. male who presents with request to return to the emergency department for repeat chest x-ray. Patient was seen on April 6 at that time radiology overread was concerned for a possible small apical pneumothorax. I attempted to call him was unable to reach him by phone and a post card was sent he presents to the emergency department with the postcard for follow-up. patient states of breath with exertion as well as chest pain with exertion and syncope. Patient states that this is not new this is happened approximately 15 times in the past and he denies any chest pain or shortness of breath at present. PCP: Maynor Adhikari, Not On File          Past History     Past Medical History:  Past Medical History:   Diagnosis Date    Ill-defined condition     Pt passes out  does not know why. happened 15 times gonzalez. Last time ; Aug 2018       Past Surgical History:  Past Surgical History:   Procedure Laterality Date    HX CATARACT REMOVAL Left 01/24/2018       Family History:  No family history on file. Social History:  Social History     Tobacco Use    Smoking status: Current Every Day Smoker     Packs/day: 0.50     Years: 45.00     Pack years: 22.50    Smokeless tobacco: Never Used   Substance Use Topics    Alcohol use: Yes     Alcohol/week: 16.8 oz     Types: 28 Cans of beer per week     Comment: 3-4 16 oz beers daily    Drug use: No       Allergies:  No Known Allergies      Review of Systems       Review of Systems   Constitutional: Negative for chills and fever. Respiratory: Positive for chest tightness and shortness of breath. Cardiovascular: Negative for chest pain.    Gastrointestinal: Negative for abdominal pain, nausea and vomiting. Skin: Negative for rash. Neurological: Positive for dizziness and syncope. Negative for weakness. All other systems reviewed and are negative. Physical Exam     Visit Vitals  /64   Pulse 65   Temp 98.4 °F (36.9 °C)   Resp 23   SpO2 100%       Physical Exam   Constitutional: He is oriented to person, place, and time. He appears well-developed and well-nourished. No distress. HENT:   Head: Normocephalic and atraumatic. Mouth/Throat: Oropharynx is clear and moist.   Eyes: Pupils are equal, round, and reactive to light. Conjunctivae and EOM are normal. No scleral icterus. Neck: Normal range of motion. Neck supple. Cardiovascular: Normal rate, regular rhythm and normal heart sounds. No murmur heard. Pulmonary/Chest: Effort normal and breath sounds normal. No respiratory distress. Abdominal: Soft. Bowel sounds are normal. He exhibits no distension. There is no tenderness. Musculoskeletal: He exhibits no edema. Lymphadenopathy:     He has no cervical adenopathy. Neurological: He is alert and oriented to person, place, and time. Coordination normal.   Skin: Skin is warm and dry. No rash noted. Psychiatric: He has a normal mood and affect. His behavior is normal.   Nursing note and vitals reviewed. Diagnostic Study Results     Labs -  Recent Results (from the past 12 hour(s))   CBC WITH AUTOMATED DIFF    Collection Time: 04/16/19  9:35 AM   Result Value Ref Range    WBC 5.0 4.6 - 13.2 K/uL    RBC 2.16 (L) 4.70 - 5.50 M/uL    HGB 4.1 (LL) 13.0 - 16.0 g/dL    HCT 15.3 (LL) 36.0 - 48.0 %    MCV 70.8 (L) 74.0 - 97.0 FL    MCH 19.0 (L) 24.0 - 34.0 PG    MCHC 26.8 (L) 31.0 - 37.0 g/dL    RDW 26.5 (H) 11.6 - 14.5 %    PLATELET 974 (H) 230 - 420 K/uL    MPV 9.3 9.2 - 11.8 FL    NEUTROPHILS 58 42 - 75 %    LYMPHOCYTES 20 20 - 51 %    MONOCYTES 21 (H) 2 - 9 %    EOSINOPHILS 1 0 - 5 %    BASOPHILS 0 0 - 3 %    ABS. NEUTROPHILS 2.8 1.8 - 8.0 K/UL    ABS.  LYMPHOCYTES 1.0 0.8 - 3.5 K/UL    ABS. MONOCYTES 1.1 (H) 0 - 1.0 K/UL    ABS. EOSINOPHILS 0.1 0.0 - 0.4 K/UL    ABS. BASOPHILS 0.0 0.0 - 0.1 K/UL    RBC COMMENTS ANISOCYTOSIS  1+        RBC COMMENTS POLYCHROMASIA  1+        RBC COMMENTS HYPOCHROMIA  3+        RBC COMMENTS TARGET CELLS  1+        DF MANUAL     METABOLIC PANEL, COMPREHENSIVE    Collection Time: 04/16/19  9:35 AM   Result Value Ref Range    Sodium 140 136 - 145 mmol/L    Potassium 4.2 3.5 - 5.5 mmol/L    Chloride 108 100 - 108 mmol/L    CO2 25 21 - 32 mmol/L    Anion gap 7 3.0 - 18 mmol/L    Glucose 88 74 - 99 mg/dL    BUN 7 7.0 - 18 MG/DL    Creatinine 0.93 0.6 - 1.3 MG/DL    BUN/Creatinine ratio 8 (L) 12 - 20      GFR est AA >60 >60 ml/min/1.73m2    GFR est non-AA >60 >60 ml/min/1.73m2    Calcium 8.7 8.5 - 10.1 MG/DL    Bilirubin, total 0.3 0.2 - 1.0 MG/DL    ALT (SGPT) 21 16 - 61 U/L    AST (SGOT) 27 15 - 37 U/L    Alk.  phosphatase 84 45 - 117 U/L    Protein, total 7.5 6.4 - 8.2 g/dL    Albumin 3.5 3.4 - 5.0 g/dL    Globulin 4.0 2.0 - 4.0 g/dL    A-G Ratio 0.9 0.8 - 1.7     TROPONIN I    Collection Time: 04/16/19  9:35 AM   Result Value Ref Range    Troponin-I, QT <0.02 0.0 - 0.045 NG/ML   PTT    Collection Time: 04/16/19  9:35 AM   Result Value Ref Range    aPTT 25.0 23.0 - 36.4 SEC   PROTHROMBIN TIME + INR    Collection Time: 04/16/19  9:35 AM   Result Value Ref Range    Prothrombin time 16.1 (H) 11.5 - 15.2 sec    INR 1.3 (H) 0.8 - 1.2     ETHYL ALCOHOL    Collection Time: 04/16/19  9:35 AM   Result Value Ref Range    ALCOHOL(ETHYL),SERUM 44 (H) 0 - 3 MG/DL   EKG, 12 LEAD, INITIAL    Collection Time: 04/16/19  9:41 AM   Result Value Ref Range    Ventricular Rate 78 BPM    Atrial Rate 78 BPM    P-R Interval 156 ms    QRS Duration 88 ms    Q-T Interval 362 ms    QTC Calculation (Bezet) 412 ms    Calculated P Axis 61 degrees    Calculated R Axis 42 degrees    Calculated T Axis 44 degrees    Diagnosis       Normal sinus rhythm  Normal ECG  When compared with ECG of 06-APR-2019 23:19,  DC interval has increased  Criteria for Inferior infarct are no longer present  Nonspecific T wave abnormality has replaced inverted T waves in Inferior   leads     TYPE & SCREEN    Collection Time: 04/16/19 10:15 AM   Result Value Ref Range    Crossmatch Expiration 04/19/2019     ABO/Rh(D) O POSITIVE     Antibody screen NEG     CALLED TO: BJ MATUTE IN ER AT 1113 ON 4/16/2019 BY JOHNSON     Unit number [id number]     Blood component type RC LR     Unit division 00     Status of unit ISSUED     Crossmatch result Compatible     Unit number [id number]     Blood component type RC LR,2     Unit division 00     Status of unit ISSUED     Crossmatch result Compatible    POC CHEM8    Collection Time: 04/16/19 10:21 AM   Result Value Ref Range    CO2, POC 21 19 - 24 MMOL/L    Glucose, POC 97 74 - 106 MG/DL    BUN, POC 5 (L) 7 - 18 MG/DL    Creatinine, POC 0.9 0.6 - 1.3 MG/DL    GFRAA, POC >60 >60 ml/min/1.73m2    GFRNA, POC >60 >60 ml/min/1.73m2    Sodium,  136 - 145 MMOL/L    Potassium, POC 4.0 3.5 - 5.5 MMOL/L    Calcium, ionized (POC) 1.20 1. 12 - 1.32 mmol/L    Chloride,  100 - 108 MMOL/L    Anion gap, POC 19 10 - 20      Hematocrit, POC 20 (L) 36 - 49 %    Hemoglobin, POC 6.8 (L) 12 - 16 G/DL       Radiologic Studies -   XR CHEST PA LAT   Final Result   IMPRESSION:         1. No evidence of pneumothorax. 2. Faint linear opacities at the right lung base likely reflecting bands of   scarring or atelectasis is noted preceding abdominal/pelvic CT 9/15/2018            Medical Decision Making   I am the first provider for this patient. I reviewed the vital signs, available nursing notes, past medical history, past surgical history, family history and social history. Vital Signs-Reviewed the patient's vital signs.       EKG: EKG shows normal sinus rhythm at a rate of 78 no acute ST-T wave changes QTC is 412 time read is 9:42 AM    Records Reviewed: Nursing Notes and Old Medical Records (Time of Review: 9:34 AM)    ED Course: Progress Notes, Reevaluation, and Consults:      Provider Notes (Medical Decision Making):   MDM  Number of Diagnoses or Management Options  Alcohol abuse:   Anemia, unspecified type:   Diagnosis management comments: Call back for possible radiology over read of small apical pneumothorax    10:59 AM  X-rays negative however on check of labs which were drawn for his complaint of chest pain dyspnea on exertion he was noted to have a low hemoglobin and review of old records he has had chronically low hemoglobin in the past.  Rectal exam here is guaiac negative will transfuse 1 unit case management for ED follow-up.    6:03 PM  Will give second unit prior to discharge        Amount and/or Complexity of Data Reviewed  Clinical lab tests: ordered and reviewed  Independent visualization of images, tracings, or specimens: yes    Risk of Complications, Morbidity, and/or Mortality  Presenting problems: high  Diagnostic procedures: moderate  Management options: moderate                Diagnosis     Clinical Impression:   1. Anemia, unspecified type    2. Alcohol abuse        Disposition: home     Follow-up Information     Follow up With Specialties Details Why [facility] EMERGENCY DEPT Emergency Medicine  As needed, If symptoms worsen [address]  [phone]    Your Doctor for recheck this week         Fredrick    [address] [place] [address]  [phone]           Patient's Medications    No medications on file     _______________________________    Please note that this dictation was completed with [employer], the computer voice recognition software. Quite often unanticipated grammatical, syntax, homophones, and other interpretive errors are inadvertently transcribed by the computer software. Please disregard these errors. Please excuse any errors that have escaped final proofreading.

## 2019-04-16 NOTE — PROGRESS NOTES
Referral received to assist with PCP follow up. Met with pt at bedside. He states that he does have a provider at [facility] and her name is Jordan Ko. He states that he will see her in 2 weeks. ED MD made aware.

## 2019-04-16 NOTE — DISCHARGE INSTRUCTIONS
Patient Education        Learning About Alcohol Misuse  What is alcohol misuse? Alcohol misuse means drinking so much that it causes problems for you or others. Early problems with alcohol can start at home. You may argue with loved ones about how much you're drinking. Your job may be affected because of drinking. You may drink when it's dangerous or illegal, such as when you drive. Drinking too much for a long time can lead to health conditions like high blood pressure and liver problems. What are the symptoms? Symptoms of alcohol misuse may include:  · Drinking much more than you planned. · Drinking even though it's causing problems for you or others. · Putting yourself in situations where you might get hurt. · Wanting to cut down or stop drinking, but not being able to. · Feeling guilty about how much you're drinking. How is alcohol misuse treated? Getting help for problems with alcohol is up to you. But you don't have to do it alone. There are many people and kinds of treatments to help with alcohol problems. Talking to your doctor is the first step. When you get a doctor's help, treatment for alcohol problems can be safer and quicker. Treatment options can include:  · Treatment programs. Examples are group therapy, one or more types of counseling, and alcohol education. · Medicines. A doctor or counselor can help you know what kinds of medicines might help with cravings. · Free social support groups. These groups include AA (Alcoholics Anonymous) and SMART (Self-Management and Recovery Training). Your doctor can help you decide which type of program is best for you. Follow-up care is a key part of your treatment and safety. Be sure to make and go to all appointments, and call your doctor if you are having problems. It's also a good idea to know your test results and keep a list of the medicines you take. Where can you learn more? Go to http://shaquille-licha.info/.   Enter H758 in the search box to learn more about \"Learning About Alcohol Misuse. \"  Current as of: May 7, 2018  Content Version: 11.9  © 5884-1182 [employer]. Care instructions adapted under license by [employer] (which disclaims liability or warranty for this information). If you have questions about a medical condition or this instruction, always ask your healthcare professional. [place] [address] any warranty or liability for your use of this information.

## 2019-04-16 NOTE — ED NOTES
Patient is tolerating blood transfusion well. Daughter is at bedside and wishes to speak with  regarding home health aide.  made aware and orders entered.  stated that [facility] had to be phoned and case management would update daughter after speaking with [facility]. Daughter made aware by this nurse.

## 2019-04-16 NOTE — ED NOTES
Shift change report received from Reginald Kunz 429 assumed care of pt.     -pt recieveing 2nd unit of blood, rate increased form 75mL/hr to 125mL/hr    -will do Chem 8 after unit finishes to determine of d/c

## 2019-04-16 NOTE — PROGRESS NOTES
Spoke with daughter on phone and she's requesting for a UAI done. Explained to her that we don't do UAI in ED but will call [facility]. She agreed for me to call [facility]. Called [facility] and left a message to return call.

## 2019-04-17 LAB
ABO + RH BLD: NORMAL
BLD PROD TYP BPU: NORMAL
BLD PROD TYP BPU: NORMAL
BLOOD GROUP ANTIBODIES SERPL: NORMAL
BPU ID: NORMAL
BPU ID: NORMAL
CALLED TO:,BCALL1: NORMAL
CROSSMATCH RESULT,%XM: NORMAL
CROSSMATCH RESULT,%XM: NORMAL
SPECIMEN EXP DATE BLD: NORMAL
STATUS OF UNIT,%ST: NORMAL
STATUS OF UNIT,%ST: NORMAL
UNIT DIVISION, %UDIV: 0
UNIT DIVISION, %UDIV: 0

## 2019-04-29 ENCOUNTER — IMPORTED ENCOUNTER (OUTPATIENT)
Dept: URBAN - METROPOLITAN AREA CLINIC 1 | Facility: CLINIC | Age: 69
End: 2019-04-29

## 2019-04-29 PROBLEM — Z96.1: Noted: 2019-04-29

## 2019-04-29 PROBLEM — H40.1111: Noted: 2019-04-29

## 2019-04-29 PROBLEM — H04.123: Noted: 2019-04-29

## 2019-04-29 PROBLEM — H43.811: Noted: 2019-04-29

## 2019-04-29 PROBLEM — H40.1122: Noted: 2019-04-29

## 2019-04-29 PROCEDURE — 92014 COMPRE OPH EXAM EST PT 1/>: CPT

## 2019-04-29 PROCEDURE — 92133 CPTRZD OPH DX IMG PST SGM ON: CPT

## 2019-04-29 NOTE — PATIENT DISCUSSION
1. 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 Baseline OCT shows mild thinning OD Moderate thinning OS. Begin monotrial of Travatan Z QHS OS (Samples x2 given) Will recheck patient in 4-6 weeks for IOP check. 2.  Pseudophakia OU - (Standard OU) 3. PVD w/o Tear OD - RD precautions. 4.  Dry Eyes OU - Begin ATs TID OU Routinely (Sample of Refresh given) Mrx for glasses deferred Return for an appointment in 4-6 weeks 10 (Check IOP on monotrial OS) with Dr. Mary Do.

## 2019-06-11 ENCOUNTER — IMPORTED ENCOUNTER (OUTPATIENT)
Dept: URBAN - METROPOLITAN AREA CLINIC 1 | Facility: CLINIC | Age: 69
End: 2019-06-11

## 2019-06-11 PROBLEM — H40.1122: Noted: 2019-06-11

## 2019-06-11 PROBLEM — H40.1111: Noted: 2019-06-11

## 2019-06-11 PROCEDURE — 92012 INTRM OPH EXAM EST PATIENT: CPT

## 2019-06-11 NOTE — PATIENT DISCUSSION
1.  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 Positive Family h/o Glaucoma. AA. Use Latanoprost QHS OU (ERx'd). Patient to continue with current gtt regimen. Patient advised to be compliant with gtts. Condition was discussed with patient and patient understands. Will continue to monitor patient for any progression in condition. Patient was advised to call us with any problems questions or concerns. 2.  Pseudophakia OU (Standard OU) -- Doing well. 3.  Dry Eyes OU -- Continue ATs TID OU Routinely (Sample of Refresh given) 4. H/o PVD w/o Tear ODReturn for an appointment in 4 MO for a 10 / 24-2 HVF OU with Dr. Jenny Bashir.

## 2019-06-13 ENCOUNTER — HOSPITAL ENCOUNTER (INPATIENT)
Age: 69
LOS: 4 days | Discharge: HOME HEALTH | DRG: 811 | End: 2019-06-17
Attending: EMERGENCY MEDICINE | Admitting: HOSPITALIST
Payer: MEDICARE

## 2019-06-13 ENCOUNTER — APPOINTMENT (OUTPATIENT)
Dept: NON INVASIVE DIAGNOSTICS | Age: 69
DRG: 811 | End: 2019-06-13
Attending: HOSPITALIST
Payer: MEDICARE

## 2019-06-13 DIAGNOSIS — D64.9 SYMPTOMATIC ANEMIA: Primary | ICD-10-CM

## 2019-06-13 DIAGNOSIS — R77.8 ELEVATED TROPONIN: ICD-10-CM

## 2019-06-13 DIAGNOSIS — R55 SYNCOPE, UNSPECIFIED SYNCOPE TYPE: ICD-10-CM

## 2019-06-13 PROBLEM — D62 ACUTE BLOOD LOSS ANEMIA: Status: ACTIVE | Noted: 2019-06-13

## 2019-06-13 LAB
ANION GAP SERPL CALC-SCNC: 12 MMOL/L (ref 3–18)
ATRIAL RATE: 86 BPM
BASOPHILS # BLD: 0 K/UL (ref 0–0.1)
BASOPHILS NFR BLD: 0 % (ref 0–3)
BUN SERPL-MCNC: 8 MG/DL (ref 7–18)
BUN/CREAT SERPL: 8 (ref 12–20)
CALCIUM SERPL-MCNC: 8.5 MG/DL (ref 8.5–10.1)
CALCULATED P AXIS, ECG09: 71 DEGREES
CALCULATED R AXIS, ECG10: 58 DEGREES
CALCULATED T AXIS, ECG11: -51 DEGREES
CHLORIDE SERPL-SCNC: 104 MMOL/L (ref 100–108)
CK MB CFR SERPL CALC: 0.5 % (ref 0–4)
CK MB SERPL-MCNC: 1 NG/ML (ref 5–25)
CK SERPL-CCNC: 184 U/L (ref 39–308)
CO2 SERPL-SCNC: 20 MMOL/L (ref 21–32)
CREAT SERPL-MCNC: 1.01 MG/DL (ref 0.6–1.3)
DIAGNOSIS, 93000: NORMAL
DIFFERENTIAL METHOD BLD: ABNORMAL
ECHO AO ROOT DIAM: 2.89 CM
ECHO AV PEAK GRADIENT: 0 MMHG
ECHO AV PEAK VELOCITY: 0 CM/S
ECHO EST RA PRESSURE: 10 MMHG
ECHO IVC SNIFF: 1.8 CM
ECHO LA VOL 2C: 100.31 ML (ref 18–58)
ECHO LA VOL 4C: 88.23 ML (ref 18–58)
ECHO LA VOLUME INDEX A2C: 48.62 ML/M2 (ref 16–28)
ECHO LA VOLUME INDEX A4C: 42.76 ML/M2 (ref 16–28)
ECHO LV EDV A2C: 112.1 ML
ECHO LV EDV A4C: 174.3 ML
ECHO LV EDV BP: 143.7 ML (ref 67–155)
ECHO LV EDV INDEX A4C: 84.5 ML/M2
ECHO LV EDV INDEX BP: 69.6 ML/M2
ECHO LV EDV NDEX A2C: 54.3 ML/M2
ECHO LV EJECTION FRACTION A2C: 32 %
ECHO LV EJECTION FRACTION A4C: 48 %
ECHO LV EJECTION FRACTION BIPLANE: 42.3 % (ref 55–100)
ECHO LV ESV A2C: 75.8 ML
ECHO LV ESV A4C: 90 ML
ECHO LV ESV BP: 83 ML (ref 22–58)
ECHO LV ESV INDEX A2C: 36.7 ML/M2
ECHO LV ESV INDEX A4C: 43.6 ML/M2
ECHO LV ESV INDEX BP: 40.2 ML/M2
ECHO LV INTERNAL DIMENSION DIASTOLIC: 6.1 CM (ref 4.2–5.9)
ECHO LV INTERNAL DIMENSION SYSTOLIC: 4.82 CM
ECHO LV IVSD: 0.98 CM (ref 0.6–1)
ECHO LV MASS 2D: 251.2 G (ref 88–224)
ECHO LV MASS INDEX 2D: 121.8 G/M2 (ref 49–115)
ECHO LV POSTERIOR WALL DIASTOLIC: 0.76 CM (ref 0.6–1)
ECHO LVOT DIAM: 1.97 CM
ECHO LVOT PEAK GRADIENT: 3.9 MMHG
ECHO LVOT PEAK VELOCITY: 98.76 CM/S
ECHO MV A VELOCITY: 64.22 CM/S
ECHO MV AREA PHT: 5.5 CM2
ECHO MV E DECELERATION TIME (DT): 136.8 MS
ECHO MV E VELOCITY: 98.71 CM/S
ECHO MV E/A RATIO: 1.54
ECHO MV PRESSURE HALF TIME (PHT): 39.7 MS
ECHO PULMONARY ARTERY SYSTOLIC PRESSURE (PASP): 19.8 MMHG
ECHO RIGHT VENTRICULAR SYSTOLIC PRESSURE (RVSP): 19.8 MMHG
ECHO TV REGURGITANT MAX VELOCITY: -156.18 CM/S
ECHO TV REGURGITANT PEAK GRADIENT: 9.8 MMHG
EOSINOPHIL # BLD: 0 K/UL (ref 0–0.4)
EOSINOPHIL NFR BLD: 0 % (ref 0–5)
ERYTHROCYTE [DISTWIDTH] IN BLOOD BY AUTOMATED COUNT: 29.7 % (ref 11.6–14.5)
GLUCOSE SERPL-MCNC: 113 MG/DL (ref 74–99)
HCT VFR BLD AUTO: 13 % (ref 36–48)
HCT VFR BLD AUTO: 14.7 % (ref 36–48)
HCT VFR BLD AUTO: 20.9 % (ref 36–48)
HGB BLD-MCNC: 3.6 G/DL (ref 13–16)
HGB BLD-MCNC: 4.1 G/DL (ref 13–16)
HGB BLD-MCNC: 6.1 G/DL (ref 13–16)
LYMPHOCYTES # BLD: 2.2 K/UL (ref 0.8–3.5)
LYMPHOCYTES NFR BLD: 28 % (ref 20–51)
MCH RBC QN AUTO: 20.3 PG (ref 24–34)
MCHC RBC AUTO-ENTMCNC: 27.7 G/DL (ref 31–37)
MCV RBC AUTO: 73.4 FL (ref 74–97)
MONOCYTES # BLD: 1.4 K/UL (ref 0–1)
MONOCYTES NFR BLD: 17 % (ref 2–9)
NEUTS SEG # BLD: 4.4 K/UL (ref 1.8–8)
NEUTS SEG NFR BLD: 55 % (ref 42–75)
P-R INTERVAL, ECG05: 150 MS
PLATELET # BLD AUTO: 627 K/UL (ref 135–420)
PLATELET COMMENTS,PCOM: ABNORMAL
PMV BLD AUTO: 9.5 FL (ref 9.2–11.8)
POTASSIUM SERPL-SCNC: 3.7 MMOL/L (ref 3.5–5.5)
Q-T INTERVAL, ECG07: 378 MS
QRS DURATION, ECG06: 90 MS
QTC CALCULATION (BEZET), ECG08: 452 MS
RBC # BLD AUTO: 1.77 M/UL (ref 4.7–5.5)
RBC MORPH BLD: ABNORMAL
SODIUM SERPL-SCNC: 136 MMOL/L (ref 136–145)
TROPONIN I SERPL-MCNC: 0.25 NG/ML (ref 0–0.04)
VENTRICULAR RATE, ECG03: 86 BPM
WBC # BLD AUTO: 8 K/UL (ref 4.6–13.2)

## 2019-06-13 PROCEDURE — 74011250636 HC RX REV CODE- 250/636: Performed by: HOSPITALIST

## 2019-06-13 PROCEDURE — 65660000000 HC RM CCU STEPDOWN

## 2019-06-13 PROCEDURE — 86900 BLOOD TYPING SEROLOGIC ABO: CPT

## 2019-06-13 PROCEDURE — 30233N1 TRANSFUSION OF NONAUTOLOGOUS RED BLOOD CELLS INTO PERIPHERAL VEIN, PERCUTANEOUS APPROACH: ICD-10-PCS | Performed by: HOSPITALIST

## 2019-06-13 PROCEDURE — 99285 EMERGENCY DEPT VISIT HI MDM: CPT

## 2019-06-13 PROCEDURE — 36430 TRANSFUSION BLD/BLD COMPNT: CPT

## 2019-06-13 PROCEDURE — 93005 ELECTROCARDIOGRAM TRACING: CPT

## 2019-06-13 PROCEDURE — 85018 HEMOGLOBIN: CPT

## 2019-06-13 PROCEDURE — 82550 ASSAY OF CK (CPK): CPT

## 2019-06-13 PROCEDURE — P9016 RBC LEUKOCYTES REDUCED: HCPCS

## 2019-06-13 PROCEDURE — 80048 BASIC METABOLIC PNL TOTAL CA: CPT

## 2019-06-13 PROCEDURE — 93306 TTE W/DOPPLER COMPLETE: CPT

## 2019-06-13 PROCEDURE — 85025 COMPLETE CBC W/AUTO DIFF WBC: CPT

## 2019-06-13 PROCEDURE — 86923 COMPATIBILITY TEST ELECTRIC: CPT

## 2019-06-13 PROCEDURE — 74011250636 HC RX REV CODE- 250/636: Performed by: EMERGENCY MEDICINE

## 2019-06-13 PROCEDURE — 77030013169 SET IV BLD ICUM -A

## 2019-06-13 RX ORDER — FUROSEMIDE 10 MG/ML
40 INJECTION INTRAMUSCULAR; INTRAVENOUS ONCE
Status: COMPLETED | OUTPATIENT
Start: 2019-06-13 | End: 2019-06-13

## 2019-06-13 RX ORDER — ZOLPIDEM TARTRATE 5 MG/1
5 TABLET ORAL
Status: DISCONTINUED | OUTPATIENT
Start: 2019-06-13 | End: 2019-06-18 | Stop reason: HOSPADM

## 2019-06-13 RX ORDER — SODIUM CHLORIDE 9 MG/ML
250 INJECTION, SOLUTION INTRAVENOUS AS NEEDED
Status: DISCONTINUED | OUTPATIENT
Start: 2019-06-13 | End: 2019-06-18 | Stop reason: HOSPADM

## 2019-06-13 RX ORDER — DEXTROSE, SODIUM CHLORIDE, AND POTASSIUM CHLORIDE 5; .45; .15 G/100ML; G/100ML; G/100ML
100 INJECTION INTRAVENOUS CONTINUOUS
Status: DISCONTINUED | OUTPATIENT
Start: 2019-06-13 | End: 2019-06-16

## 2019-06-13 RX ADMIN — DEXTROSE MONOHYDRATE, SODIUM CHLORIDE, AND POTASSIUM CHLORIDE 100 ML/HR: 50; 4.5; 1.49 INJECTION, SOLUTION INTRAVENOUS at 06:47

## 2019-06-13 RX ADMIN — SODIUM CHLORIDE 250 ML: 900 INJECTION, SOLUTION INTRAVENOUS at 04:35

## 2019-06-13 RX ADMIN — FUROSEMIDE 40 MG: 10 INJECTION, SOLUTION INTRAMUSCULAR; INTRAVENOUS at 15:20

## 2019-06-13 NOTE — ED TRIAGE NOTES
Patient arrived via EMS s/p syncopial episode. Patient states he was sitting in his chair and went to put down his cup and woke up on the floor. \"This is the first time I fell out in 5 months. \" Patient A&O at this time. C/O pain on left lateral eyebrow. Small area of swelling noted in same area.

## 2019-06-13 NOTE — ED PROVIDER NOTES
[facility] EMERGENCY DEPT      2:02 AM    Date: 6/13/2019  Patient Name: Ruma Weaver    History of Presenting Illness     Chief Complaint   Patient presents with    Syncope       80 y.o. male with noted past medical history who presents to the emergency department status post syncope and fall from sitting position. Patient states that he was at home today and over the course the day had 2 beers with the last one being at 1430 hrs. Tonight he was actually drinking some ice that he had a cup and he states he went to put the cup on table and subsequently passed out. He is unsure how long he was out for. After awakening he noted that he had some pain in his left upper lateral eyebrow area. He called a bus for transfer to the ER after that. Patient states this is happened to him in the past.  He also admits he has had prior blood transfusion in the past for anemia. On initial MD exam, the patient states he feels fine. Patient denies any other associated signs or symptoms. Patient denies any other complaints. Nursing notes regarding the HPI and triage nursing notes were reviewed. Prior medical records were reviewed. Past History     Past Medical History:  Past Medical History:   Diagnosis Date    Ill-defined condition     Pt passes out  does not know why. happened 15 times gonzalez. Last time ; Aug 2018       Past Surgical History:  Past Surgical History:   Procedure Laterality Date    HX CATARACT REMOVAL Left 01/24/2018       Family History:  History reviewed. No pertinent family history. Social History:  Social History     Tobacco Use    Smoking status: Current Every Day Smoker     Packs/day: 0.50     Years: 45.00     Pack years: 22.50    Smokeless tobacco: Never Used   Substance Use Topics    Alcohol use:  Yes     Alcohol/week: 16.8 oz     Types: 28 Cans of beer per week     Comment: 3-4 16 oz beers daily    Drug use: No       Allergies:  No Known Allergies    Patient's primary care provider (as noted in EPIC): Other, MD Ludwin    Review of Systems   Constitutional: Negative for diaphoresis. HENT: Negative for congestion. Eyes: Negative for discharge. Respiratory: Negative for stridor. Cardiovascular: Negative for palpitations. Gastrointestinal: Negative for diarrhea. Endocrine: Negative for heat intolerance. Genitourinary: Negative for flank pain. Musculoskeletal: Negative for back pain. Neurological: Positive for syncope. Negative for weakness. Psychiatric/Behavioral: Negative for hallucinations. All other systems reviewed and are negative. Visit Vitals  /62 (BP 1 Location: Left arm, BP Patient Position: At rest)   Pulse 96   Temp 98.4 °F (36.9 °C)   Resp 20   Ht 5' 9.5\" (1.765 m)   Wt 89.4 kg (197 lb)   SpO2 100%   BMI 28.67 kg/m²       PHYSICAL EXAM:    CONSTITUTIONAL:  Alert, in no apparent distress;  well developed;  well nourished. HEAD:  Normocephalic, atraumatic. EYES:  EOMI. Non-icteric sclera. Normal conjunctiva. ENTM:  Nose:  no rhinorrhea. Throat:  no erythema or exudate, mucous membranes moist.  NECK:  No JVD. Supple  RESPIRATORY:  Chest clear, equal breath sounds, good air movement. CARDIOVASCULAR:  Regular rate and rhythm. No murmurs, rubs, or gallops. GI:  Normal bowel sounds, abdomen soft and non-tender. No rebound or guarding. BACK:  Non-tender. UPPER EXT:  Normal inspection. LOWER EXT:  No edema, no calf tenderness. Distal pulses intact. NEURO:  Moves all four extremities. Normal motor exam and sensation in all four extremities. Normal CN II-XII exam.  Normal bilateral finger-to-nose exam.     SKIN:  No rashes;  Normal for age. PSYCH:  Alert and normal affect.     ED COURSE:      Diagnostic Study Results     Abnormal lab results from this emergency department encounter:  Labs Reviewed   CBC WITH AUTOMATED DIFF - Abnormal; Notable for the following components:       Result Value    RBC 1.77 (*)     HGB 3.6 (*)     HCT 13.0 (*)     MCV 73.4 (*)     MCH 20.3 (*)     MCHC 27.7 (*)     RDW 29.7 (*)     PLATELET 607 (*)     All other components within normal limits   METABOLIC PANEL, BASIC - Abnormal; Notable for the following components:    CO2 20 (*)     Glucose 113 (*)     BUN/Creatinine ratio 8 (*)     All other components within normal limits   CARDIAC PANEL,(CK, CKMB & TROPONIN) - Abnormal; Notable for the following components:    Troponin-I, QT 0.25 (*)     All other components within normal limits       Lab values for this patient within approximately the last 12 hours:  Recent Results (from the past 12 hour(s))   CBC WITH AUTOMATED DIFF    Collection Time: 06/13/19  1:40 AM   Result Value Ref Range    WBC 8.0 4.6 - 13.2 K/uL    RBC 1.77 (L) 4.70 - 5.50 M/uL    HGB 3.6 (LL) 13.0 - 16.0 g/dL    HCT 13.0 (LL) 36.0 - 48.0 %    MCV 73.4 (L) 74.0 - 97.0 FL    MCH 20.3 (L) 24.0 - 34.0 PG    MCHC 27.7 (L) 31.0 - 37.0 g/dL    RDW 29.7 (H) 11.6 - 14.5 %    PLATELET 172 (H) 520 - 420 K/uL    MPV 9.5 9.2 - 11.8 FL    NEUTROPHILS PENDING %    LYMPHOCYTES PENDING %    MONOCYTES PENDING %    EOSINOPHILS PENDING %    BASOPHILS PENDING %    ABS. NEUTROPHILS PENDING K/UL    ABS. LYMPHOCYTES PENDING K/UL    ABS. MONOCYTES PENDING K/UL    ABS. EOSINOPHILS PENDING K/UL    ABS.  BASOPHILS PENDING K/UL    DF PENDING    METABOLIC PANEL, BASIC    Collection Time: 06/13/19  1:40 AM   Result Value Ref Range    Sodium 136 136 - 145 mmol/L    Potassium 3.7 3.5 - 5.5 mmol/L    Chloride 104 100 - 108 mmol/L    CO2 20 (L) 21 - 32 mmol/L    Anion gap 12 3.0 - 18 mmol/L    Glucose 113 (H) 74 - 99 mg/dL    BUN 8 7.0 - 18 MG/DL    Creatinine 1.01 0.6 - 1.3 MG/DL    BUN/Creatinine ratio 8 (L) 12 - 20      GFR est AA >60 >60 ml/min/1.73m2    GFR est non-AA >60 >60 ml/min/1.73m2    Calcium 8.5 8.5 - 10.1 MG/DL   CARDIAC PANEL,(CK, CKMB & TROPONIN)    Collection Time: 06/13/19  1:40 AM   Result Value Ref Range     39 - 308 U/L    CK - MB 1.0 <3.6 ng/ml    CK-MB Index 0.5 0.0 - 4.0 %    Troponin-I, QT 0.25 (H) 0.0 - 0.045 NG/ML   EKG, 12 LEAD, SUBSEQUENT    Collection Time: 06/13/19  2:11 AM   Result Value Ref Range    Ventricular Rate 86 BPM    Atrial Rate 86 BPM    P-R Interval 150 ms    QRS Duration 90 ms    Q-T Interval 378 ms    QTC Calculation (Bezet) 452 ms    Calculated P Axis 71 degrees    Calculated R Axis 58 degrees    Calculated T Axis -51 degrees    Diagnosis       Normal sinus rhythm  ST & T wave abnormality, consider inferolateral ischemia  Abnormal ECG  When compared with ECG of 16-APR-2019 09:41,  T wave inversion now evident in Inferior leads  T wave inversion now evident in Lateral leads         Radiologist and cardiologist interpretations if available at time of this note:  No results found. Emergency physician interpretation of EKG: Normal sinus rhythm about 85 bpm with mild ST depression in the inferolateral leads. Medication(s) ordered for patient during this emergency visit encounter:  Medications - No data to display    Medical Decision Making     I am the first provider for this patient. I reviewed the vital signs, available nursing notes, past medical history, past surgical history, family history and social history. Vital Signs:  Reviewed the patient's vital signs. Initial EKG interpretation by attending emergency physician: Pelvic back to        [facility] Syncope Rule   C:  Congestive Heart Failure history -no. H:  Hematocrit <30% - yes. E:  Electrocardiogram abnormal - no  S:  Shortness of Breath -no. S:  Systolic Blood Pressure <22 mmHg at triage -no. Admit to Hospitalist    The patient was presented to the accepting hospitalist, Dr. Mari Fabian. The patient's primary doctor is [place], MD Ludwin, and admissions for this physician are with the hospitalist.  If the patient has no primary doctor, then admission is to the hospitalist as well.      As the emergency physician, I wrote courtesy admission orders for the hospitalist physician. The courtesy orders included explicit instructions for the floor nursing staff to call the admitting attending physician upon patient arrival on the floor. Dictation disclaimer:  Please note that this dictation was completed with [employer], the computer voice recognition software. Quite often unanticipated grammatical, syntax, homophones, and other interpretive errors are inadvertently transcribed by the computer software. Please disregard these errors. Please excuse any errors that have escaped final proofreading. Coding Diagnoses     Clinical Impression:   1. Symptomatic anemia    2. Syncope, unspecified syncope type        Disposition     Disposition: Admit. WALESKA Weir Board Certified Emergency Physician    Provider Attestation:  If a scribe was utilized in generation of this patient record, I personally performed the services described in the documentation, reviewed the documentation, as recorded by the scribe in my presence, and it accurately records the patient's history of presenting illness, review of systems, patient physical examination, and procedures performed by me as the attending physician. WALESKA Weir Board Certified Emergency Physician  6/13/2019.  2:10 AM

## 2019-06-13 NOTE — ED NOTES
Critical H&H reported to Dr. Donn López over telephone. Dr. Donn López to order 2 more units of blood.  Primary nurse notified of new order

## 2019-06-13 NOTE — ED NOTES
Bedside and Verbal shift change report given to BELGICA Hoover (oncoming nurse) by Yasmany Hunter RN (offgoing nurse). Report included the following information SBAR, ED Summary, MAR and Recent Results.

## 2019-06-13 NOTE — H&P
History and Physical    Patient: Erick Kaufman               Sex: male          DOA: 6/13/2019       YOB: 1950      Age:  80 y.o.        LOS:  LOS: 0 days        HPI:     Erick Kaufman is a 80 y.o. male who presented to the ER after experiencing syncope at home. He was eating ice and put the cup down on the table and reports, \"then I passed out and fell down and hit my head on the leg of the table. \"  He reports that he has had severe anemia in the past but that he has not had a cause of his anemia identified. He has had syncope multiple times in the past.  He reports that in 2015 he had a traumatic brain injury and had \"blood on the brain. \"  The injury occurred when he fell down 14 steps and hit his head. He does not have chest pain or SOB. He does not have GI bleeding symptoms. In the ER he is found to have a hemoglobin of 3.6. He is getting blood transfusion and will be admitted for ongoing management. Past Medical History:   Diagnosis Date    Ill-defined condition     Pt passes out  does not know why. happened 15 times gonzalez. Last time ; Aug 2018       Social History:   Tobacco use:  Patient has smoked for many years   Alcohol use:  Patient uses alcohol occasionally   Occupation:  Patient is retired after working for the [employer]. 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 GI:  No nausea or vomitting. No diarrhea  :  No hematuria or dysuria  Skin:  No rashes or moles  Neuro:  Syncope as above.   No seizures  Hematological:  Severe anemia as above, no known cause, no bruising or bleeding  Endocrine:  No diabetes or thyroid disease    Physical Exam:      Visit Vitals  /60 Pulse 82   Temp 98.3 °F (36.8 °C)   Resp 26   Ht 5' 9.5\" (1.765 m)   Wt 89.4 kg (197 lb)   SpO2 100%   BMI 28.67 kg/m²       Physical Exam:    Gen:  No distress, alert  HEENT:  Normal cephalic atraumatic, extra-occular movements are intact. Neck:  Supple, No JVD  Lungs:  Clear bilaterally, no wheeze, no rales, normal effort  Heart:  Regular Rate and Rhythm, normal S1 and S2, no edema  Abdomen:  Soft, non tender, normal bowel sounds, no guarding. Extremities:  Well perfused, no cyanosis or edema  Neurological:  Awake and alert, CN's are intact, normal strength throughout extremities  Skin:  No rashes or moles  Mental Status:  Normal thought process, does not appear anxious    Laboratory Studies:    BMP:   Lab Results   Component Value Date/Time     06/13/2019 01:40 AM    K 3.7 06/13/2019 01:40 AM     06/13/2019 01:40 AM    CO2 20 (L) 06/13/2019 01:40 AM    AGAP 12 06/13/2019 01:40 AM     (H) 06/13/2019 01:40 AM    BUN 8 06/13/2019 01:40 AM    CREA 1.01 06/13/2019 01:40 AM    GFRAA >60 06/13/2019 01:40 AM    GFRNA >60 06/13/2019 01:40 AM     CBC:   Lab Results   Component Value Date/Time    WBC 8.0 06/13/2019 01:40 AM    HGB 3.6 (LL) 06/13/2019 01:40 AM    HCT 13.0 (LL) 06/13/2019 01:40 AM     (H) 06/13/2019 01:40 AM       Assessment/Plan     Principal Problem:    Syncope (8/4/2018)    Active Problems:    Acute blood loss anemia (6/13/2019)    History of traumatic brain injury    PLAN:    Blood transfusion. Check hgb between units  Monitor mental status  Follow H/H carefully  Monitor for GI blood loss  Patient may benefit from hematology consult  DVT prophylaxis.

## 2019-06-13 NOTE — PROGRESS NOTES
attempted to  Complete the initial Spiritual Assessment of the patient in bed 10 of the emergency room  and offer Pastoral Care support. Patient was resting peacefully  At this time. Patient does not have any Hoahaoism/cultural needs that will affect patients preferences in health care. Chaplains will continue to follow and will provide pastoral care on an as needed/requested basis.     [facility] Care Department  [phone]

## 2019-06-13 NOTE — ED NOTES
TRANSFER - ED to INPATIENT REPORT:    SBAR report made available to receiving floor on this patient being transferred to [address] [place] [address] ([facility])  for routine progression of care       Admitting diagnosis Syncope [R55]    Information from the following report(s) ED Summary was made available to receiving floor. Lines:   Peripheral IV 06/13/19 Right Antecubital (Active)   Site Assessment Clean, dry, & intact 6/13/2019  1:44 AM   Phlebitis Assessment 0 6/13/2019  1:44 AM   Infiltration Assessment 0 6/13/2019  1:44 AM   Dressing Status Clean, dry, & intact 6/13/2019  1:44 AM   Dressing Type Transparent 6/13/2019  1:44 AM   Hub Color/Line Status Flushed;Patent 6/13/2019  1:44 AM   Alcohol Cap Used No 6/13/2019  1:44 AM        Medication list unable to confirm    Opportunity for questions and clarification was provided.       Patient is oriented to time, place, person and situation N/A  Patient is  continent and ambulatory without assist     Valuables transported with patient     Patient transported with:   Monitor  Registered Nurse    MAP (Monitor): 81 =Monitored (most recent)  Vitals w/ MEWS Score (last day)     Date/Time MEWS Score Pulse Resp Temp BP Level of Consciousness SpO2    06/13/19 1745  --  93  21  --  136/65  --  100 %    06/13/19 1645  --  79  23  --  121/61  --  99 %    06/13/19 1630  --  85  20  --  125/60  --  99 %    06/13/19 1615  --  84  16  --  109/40  --  99 %    06/13/19 1600  --  83  25  --  108/45  --  99 %    06/13/19 1545  --  81  20  --  124/52  --  99 %    06/13/19 1530  --  82  22  --  119/56  --  99 %    06/13/19 1500  --  83  22  --  118/60  --  98 %    06/13/19 1445  --  83  25  --  126/61  --  99 %    06/13/19 1430  --  83  22  --  119/63  --  99 %    06/13/19 1415  --  87  25  --  115/52  --  97 %    06/13/19 1400  --  84  18  --  114/48  --  100 %    06/13/19 1345  --  81  20  --  114/53  --  99 %    06/13/19 1330  --  87  22  --  121/54  --  96 %    06/13/19 1315  --  94  23  --  135/63  -- 98 %    06/13/19 1300  --  --  --  --  134/74  --  97 %    06/13/19 1245  --  83  15  --  112/49  --  97 %    06/13/19 1230  --  80  13  --  116/49  --  96 %    06/13/19 1215  --  82  22  --  108/45  --  99 %    06/13/19 1200  --  82  18  --  103/54  --  98 %    06/13/19 1145  --  82  22  --  109/44  --  98 %    06/13/19 1135  1  84  15  98.4 °F (36.9 °C)  114/61  Alert  96 %    06/13/19 1130  --  82  21  --  113/62  --  99 %    06/13/19 1120  --  --  --  --  113/50  --  --    06/13/19 1115  2  81  22  98.4 °F (36.9 °C)  108/54  Alert  97 %    06/13/19 1100  --  81  20  --  115/60  --  100 %    06/13/19 1045  --  84  18  --  115/56  --  98 %    06/13/19 1030  --  88  19  --  103/47  --  96 %    06/13/19 1015  --  89  16  --  106/51  --  95 %    06/13/19 1000  --  87  16  --  101/41  --  97 %    06/13/19 0945  --  90  17  --  116/42  --  88 %  (Abnormal)     06/13/19 0930  --  90  22  --  102/41  --  94 %    06/13/19 0915  2  89  25  98.4 °F (36.9 °C)  105/50  Alert  97 %    06/13/19 0900  --  89  24  --  121/60  --  99 %    06/13/19 0858  --  93  18  98.4 °F (36.9 °C)  109/45  --  --    06/13/19 0845  --  97  26  --  109/45  --  97 %    06/13/19 0830  --  89  22  --  102/37  (Abnormal)   --  93 %    06/13/19 0730  --  83  25  --  114/58  --  98 %    06/13/19 0715  --  86  24  --  114/59  --  93 %    06/13/19 0700  --  86  24  --  118/55  --  97 %    06/13/19 0645  2  82  26  98.3 °F (36.8 °C)  113/60  Alert  100 %    06/13/19 0630  1  85  16  98.3 °F (36.8 °C)  130/58  Alert  100 %    06/13/19 0600  --  86  26  --  109/62  --  96 %    06/13/19 0545  --  90  24  --  121/57  --  97 %    06/13/19 0530  0  88  14  98.2 °F (36.8 °C)  119/56  Alert  100 %    06/13/19 0505  1  87  18  98.1 °F (36.7 °C)  117/57  Alert  100 %    06/13/19 0450  1  91  15  98.2 °F (36.8 °C)  113/58  Alert  100 %    06/13/19 0415  --  96  25  98.5 °F (36.9 °C)  118/59  --  97 %    06/13/19 0400  --  89  21  --  110/58  --  100 %    06/13/19 0345  --  95  25  --  109/49  --  98 %    06/13/19 0315  --  90  21  --  116/55  --  100 %    06/13/19 0300  --  89  23  --  106/44  --  100 %    06/13/19 0245  --  87  28  --  111/54  --  100 %    06/13/19 0230  --  89  16  --  106/56  --  100 %    06/13/19 0215  --  92  26  --  109/55  --  98 %    06/13/19 0200  --  90  25  --  106/57  --  98 %    06/13/19 0145  --  94  27  --  116/57  --  97 %    06/13/19 0133  1  96  20  98.4 °F (36.9 °C)  120/62  Alert  100 %    06/13/19 0132  --  95  29  --  --  --  100 %    06/13/19 0131  --  --  --  --  118/58  --  --              Septic Patients:     Lactic Acid  No results found for: LACPOC (Most recent on top)  Repeat drawn: NO Time: N/A     ALL LACTIC ACIDS GREATER THAN 2 MUST BE REPEATED POC WITHIN 4 HOURS OR PRIOR TO ADMISSION               Total Fluid Bolus initiated and documented on MAR: NO    All ordered antibiotics initiated within first 3 hours of TIME ZERO?   NO

## 2019-06-14 LAB
ABO + RH BLD: NORMAL
ANTI-COMPLEMENT (C3B,C3D): NORMAL
BASOPHILS # BLD: 0 K/UL (ref 0–0.1)
BASOPHILS # BLD: 0 K/UL (ref 0–0.1)
BASOPHILS NFR BLD: 0 % (ref 0–3)
BASOPHILS NFR BLD: 1 % (ref 0–2)
BLASTS NFR BLD MANUAL: 0 %
BLD PROD TYP BPU: NORMAL
BLOOD GROUP ANTIBODIES SERPL: NORMAL
BPU ID: NORMAL
CALLED TO:,BCALL1: NORMAL
CALLED TO:,BCALL2: NORMAL
CALLED TO:,BCALL3: NORMAL
CROSSMATCH RESULT,%XM: NORMAL
DAT IGG-SP REAG RBC QL: NORMAL
DAT POLY-SP REAG RBC QL: NORMAL
DIFFERENTIAL METHOD BLD: ABNORMAL
DIFFERENTIAL METHOD BLD: ABNORMAL
EOSINOPHIL # BLD: 0.1 K/UL (ref 0–0.4)
EOSINOPHIL # BLD: 0.1 K/UL (ref 0–0.4)
EOSINOPHIL NFR BLD: 1 % (ref 0–5)
EOSINOPHIL NFR BLD: 2 % (ref 0–5)
ERYTHROCYTE [DISTWIDTH] IN BLOOD BY AUTOMATED COUNT: 23.1 % (ref 11.6–14.5)
ERYTHROCYTE [DISTWIDTH] IN BLOOD BY AUTOMATED COUNT: 23.4 % (ref 11.6–14.5)
FERRITIN SERPL-MCNC: 9 NG/ML (ref 8–388)
FOLATE SERPL-MCNC: 12.2 NG/ML (ref 3.1–17.5)
HCT VFR BLD AUTO: 22.7 % (ref 36–48)
HCT VFR BLD AUTO: 22.9 % (ref 36–48)
HCT VFR BLD AUTO: 24.7 % (ref 36–48)
HGB BLD-MCNC: 6.8 G/DL (ref 13–16)
HGB BLD-MCNC: 6.8 G/DL (ref 13–16)
HGB BLD-MCNC: 6.9 G/DL (ref 13–16)
HGB BLD-MCNC: 7.1 G/DL (ref 13–16)
HGB BLD-MCNC: 7.3 G/DL (ref 13–16)
IRON SATN MFR SERPL: 3 %
IRON SERPL-MCNC: 14 UG/DL (ref 50–175)
LDH SERPL L TO P-CCNC: 259 U/L (ref 81–234)
LYMPHOCYTES # BLD: 1 K/UL (ref 0.9–3.6)
LYMPHOCYTES # BLD: 1.1 K/UL (ref 0.8–3.5)
LYMPHOCYTES NFR BLD: 13 % (ref 21–52)
LYMPHOCYTES NFR BLD: 15 % (ref 20–51)
MANUAL DIFFERENTIAL PERFORMED BLD QL: ABNORMAL
MCH RBC QN AUTO: 23.4 PG (ref 24–34)
MCH RBC QN AUTO: 24.1 PG (ref 24–34)
MCHC RBC AUTO-ENTMCNC: 29.7 G/DL (ref 31–37)
MCHC RBC AUTO-ENTMCNC: 31.3 G/DL (ref 31–37)
MCV RBC AUTO: 76.9 FL (ref 74–97)
MCV RBC AUTO: 78.7 FL (ref 74–97)
METAMYELOCYTES NFR BLD MANUAL: 0 %
MONOCYTES # BLD: 1.1 K/UL (ref 0–1)
MONOCYTES # BLD: 1.9 K/UL (ref 0.05–1.2)
MONOCYTES NFR BLD: 15 % (ref 2–9)
MONOCYTES NFR BLD: 27 % (ref 3–10)
MYELOCYTES NFR BLD MANUAL: 0 %
NEUTS BAND NFR BLD MANUAL: 0 % (ref 0–5)
NEUTS SEG # BLD: 4.3 K/UL (ref 1.8–8)
NEUTS SEG # BLD: 5.1 K/UL (ref 1.8–8)
NEUTS SEG NFR BLD: 58 % (ref 40–73)
NEUTS SEG NFR BLD: 68 % (ref 42–75)
PLATELET # BLD AUTO: 495 K/UL (ref 135–420)
PLATELET # BLD AUTO: 559 K/UL (ref 135–420)
PLATELET COMMENTS,PCOM: ABNORMAL
PMV BLD AUTO: 9 FL (ref 9.2–11.8)
PMV BLD AUTO: 9.5 FL (ref 9.2–11.8)
PROMYELOCYTES NFR BLD MANUAL: 0 %
RBC # BLD AUTO: 2.91 M/UL (ref 4.7–5.5)
RBC # BLD AUTO: 2.95 M/UL (ref 4.7–5.5)
RBC MORPH BLD: ABNORMAL
SPECIMEN EXP DATE BLD: NORMAL
STATUS OF UNIT,%ST: NORMAL
TIBC SERPL-MCNC: 404 UG/DL (ref 250–450)
UNIT DIVISION, %UDIV: 0
VIT B12 SERPL-MCNC: 620 PG/ML (ref 211–911)
WBC # BLD AUTO: 7.3 K/UL (ref 4.6–13.2)
WBC # BLD AUTO: 7.4 K/UL (ref 4.6–13.2)

## 2019-06-14 PROCEDURE — 74011250636 HC RX REV CODE- 250/636: Performed by: INTERNAL MEDICINE

## 2019-06-14 PROCEDURE — 74011250636 HC RX REV CODE- 250/636: Performed by: HOSPITALIST

## 2019-06-14 PROCEDURE — 86880 COOMBS TEST DIRECT: CPT

## 2019-06-14 PROCEDURE — 83615 LACTATE (LD) (LDH) ENZYME: CPT

## 2019-06-14 PROCEDURE — 83540 ASSAY OF IRON: CPT

## 2019-06-14 PROCEDURE — 83010 ASSAY OF HAPTOGLOBIN QUANT: CPT

## 2019-06-14 PROCEDURE — 36415 COLL VENOUS BLD VENIPUNCTURE: CPT

## 2019-06-14 PROCEDURE — 85027 COMPLETE CBC AUTOMATED: CPT

## 2019-06-14 PROCEDURE — 65660000000 HC RM CCU STEPDOWN

## 2019-06-14 PROCEDURE — 82728 ASSAY OF FERRITIN: CPT

## 2019-06-14 PROCEDURE — 85025 COMPLETE CBC W/AUTO DIFF WBC: CPT

## 2019-06-14 PROCEDURE — 85018 HEMOGLOBIN: CPT

## 2019-06-14 PROCEDURE — 82607 VITAMIN B-12: CPT

## 2019-06-14 RX ADMIN — DEXTROSE MONOHYDRATE, SODIUM CHLORIDE, AND POTASSIUM CHLORIDE 100 ML/HR: 50; 4.5; 1.49 INJECTION, SOLUTION INTRAVENOUS at 14:55

## 2019-06-14 RX ADMIN — IRON SUCROSE 300 MG: 20 INJECTION, SOLUTION INTRAVENOUS at 14:46

## 2019-06-14 RX ADMIN — DEXTROSE MONOHYDRATE, SODIUM CHLORIDE, AND POTASSIUM CHLORIDE 100 ML/HR: 50; 4.5; 1.49 INJECTION, SOLUTION INTRAVENOUS at 04:00

## 2019-06-14 NOTE — PROGRESS NOTES
PT screen received and chart reviewed. Per chart review of admitting diagnosis and relevant prior level of function formal PT/OT evaluations are NOT indicated. PT/OT evaluation is NOT indicated.      Thank you,     Jhonatan Weir, PT, DPT  Office Extension: [phone]

## 2019-06-14 NOTE — PROGRESS NOTES
Problem: Falls - Risk of  Goal: *Absence of Falls  Description  Document Clide Failing Fall Risk and appropriate interventions in the flowsheet.   Outcome: Progressing Towards Goal     Problem: Patient Education: Go to Patient Education Activity  Goal: Patient/Family Education  Outcome: Progressing Towards Goal     Problem: Anemia Care Plan (Adult and Pediatric)  Goal: *Labs within defined limits  Outcome: Progressing Towards Goal  Goal: *Tolerates increased activity  Outcome: Progressing Towards Goal     Problem: Patient Education: Go to Patient Education Activity  Goal: Patient/Family Education  Outcome: Progressing Towards Goal

## 2019-06-14 NOTE — PROGRESS NOTES
Internal Medicine Progress Note    Patient's Name: Ana Laura Soto Date: 6/13/2019  Length of Stay: 1      Assessment/Plan     Principal Problem:    Syncope (8/4/2018)    Active Problems:    Acute blood loss anemia (6/13/2019)    Syncope  - likely 2/2 anemia  - cardiac monitoring  - echo done, results below    Anemia  - q6 H&H  - hematology consult - appreciate  - GI consult - appreciate  - IV venofer  - b12, folate, ferritin WNL  - direct itz negative  - haptoglobin, smear pending      - Cont acceptable home medications for chronic conditions   - DVT protocol    I have personally reviewed all pertinent labs and films that have officially resulted over the last 24 hours. I have personally checked for all pending labs that are awaiting final results. Interval History     Per H&P, \"Clay Arndt is a 80 y.o. male who presented to the ER after experiencing syncope at home. He was eating ice and put the cup down on the table and reports, \"then I passed out and fell down and hit my head on the leg of the table. \"  He reports that he has had severe anemia in the past but that he has not had a cause of his anemia identified. He has had syncope multiple times in the past.  He reports that in 2015 he had a traumatic brain injury and had \"blood on the brain. \"  The injury occurred when he fell down 14 steps and hit his head. He does not have chest pain or SOB. He does not have GI bleeding symptoms. In the ER he is found to have a hemoglobin of 3.6. He is getting blood transfusion and will be admitted for ongoing management. \"    Patient had a similar episode in April 2019, was given 2-3 units PRBCs in ED and sent home. He denies hematochezia, melena. Hematology consulted - suspicious of GIB, recommends GI consult for upper/lower endoscopy, as well as lab w/u to evaluate other potential causes of anemia. Subjective     Pt s/e @ bedside. No major events overnight.  Pt offers no complaints this AM. Denies CP or SOB. Denies abd pain, nvd. Objective     Visit Vitals  /51 (BP 1 Location: Left arm, BP Patient Position: At rest)   Pulse 73   Temp 98.9 °F (37.2 °C)   Resp 18   Ht 5' 9.5\" (1.765 m)   Wt 89.4 kg (197 lb)   SpO2 96%   BMI 28.67 kg/m²       Physical Exam:  General Appearance: NAD, conversant  HENT: normocephalic/atraumatic, moist mucus membranes  Neck: No JVD, supple  Lungs: CTA with normal respiratory effort  CV: RRR, no m/r/g  Abdomen: soft, non-tender, normal bowel sounds  Extremities: no cyanosis, no peripheral edema  Neuro: No focal deficits, motor/sensory intact  Skin: Normal color, intact      Intake and Output:  Current Shift:  No intake/output data recorded. Last three shifts:  06/12 1901 - 06/14 0700  In: 3034.2 [I.V.:2470]  Out: 1125 [Urine:1125]    Lab/Data Reviewed:  CBC:   Lab Results   Component Value Date/Time    WBC 7.3 06/14/2019 01:50 AM    HGB 6.9 (L) 06/14/2019 01:28 PM    HCT 22.7 (L) 06/14/2019 01:28 PM     (H) 06/14/2019 01:50 AM       Imaging Reviewed:  No results found. Echo 6/13/19  Interpretation Summary     · Left Ventricle: Mildly dilated left ventricle. Mild systolic dysfunction. Estimated left ventricular ejection fraction is 46 - 50%. Visually measured ejection fraction. Left ventricular global hypokinesis. Severe (grade 3) left ventricular diastolic dysfunction. · Left Atrium: Mildly dilated left atrium. · Aortic Valve: Mild aortic valve sclerosis with no evidence of reduced excursion. · Mitral Valve: Mitral valve non-specific thickening. Trace mitral valve regurgitation. · IVC/Hepatic Veins: Mildly elevated central venous pressure (5-10 mmHg); IVC diameter is less than 21 mm and collapses less than 50% with respiration. Echo Findings     Left Ventricle Normal wall thickness. Mildly dilated left ventricle. Mild systolic dysfunction. The estimated ejection fraction is 46 - 50%. Visually measured ejection fraction. Global hypokinesis observed. There is severe (grade 3) left ventricular diastolic dysfunction. Left Atrium Mildly dilated left atrium. Right Ventricle Normal cavity size and global systolic function. Right Atrium Normal cavity size. Interatrial Septum Saline contrast not used. Aortic Valve No stenosis and no regurgitation. Mild aortic valve sclerosis with no evidence of reduced excursion. Mitral Valve No stenosis. Mitral valve non-specific thickening. w/o reduced excursion. Trace regurgitation. Tricuspid Valve Normal valve structure and no stenosis. Tricuspid regurgitation is inadequate for estimation of right ventricular systolic pressure. Pulmonary arterial systolic pressure is 59.4 mmHg. Pulmonic Valve Pulmonic valve normal doppler findings. Aorta Normal aortic root, ascending aortic, and aortic arch. Pulmonary Artery Pulmonary arteries not well visualized. IVC/Hepatic Veins Normal structure. Mildly elevated central venous pressure (5-10 mmHg); IVC diameter is less than 21 mm and collapses less than 50% with respiration. Pericardium Normal pericardium and no evidence of pericardial effusion.        Medications Reviewed:  Current Facility-Administered Medications   Medication Dose Route Frequency    iron sucrose (VENOFER) 300 mg in 0.9% sodium chloride 250 mL IVPB  300 mg IntraVENous Q24H    zolpidem (AMBIEN) tablet 5 mg  5 mg Oral QHS PRN    dextrose 5% - 0.45% NaCl with KCl 20 mEq/L infusion  100 mL/hr IntraVENous CONTINUOUS    0.9% sodium chloride infusion 250 mL  250 mL IntraVENous PRN    0.9% sodium chloride infusion 250 mL  250 mL IntraVENous PRN    0.9% sodium chloride infusion 250 mL  250 mL IntraVENous PRN    0.9% sodium chloride infusion 250 mL  250 mL IntraVENous PRN    0.9% sodium chloride infusion 250 mL  250 mL IntraVENous PRN         Sohail Bhatt PA-C  [facility]  Hospitalist Division  Office:  [phone]  Pager: [phone]

## 2019-06-14 NOTE — PROGRESS NOTES
0800 Received report at the bedside from off going RN covering SBAR, lines drains drips and plan. Patient denies any pain. Also states he feels better than he has in a very long time recieving all that blood. Stefan Hand

## 2019-06-14 NOTE — CONSULTS
[facility]  [address]  [place], [address]  Office Phone: [phone]  Fax: [phone]      Reason for visit:  Syncope      HPI:   Neil Luna is a 80 y.o.  male who I was asked to see in consultation at the request of Dr. Felix Pino for evaluation for Acute blood loss anemia. Patient with past medical history of traumatic brain injury, cigarette smoking, occasional alcohol drinking, previous acute blood loss anemia, brought to ER with complaint of syncopal episode. Patient said he passed out and fell down while eating ice. On review of system he denies any fevers, chills, nausea vomiting or abdominal pain. He had shortness of breath which has gotten better after blood transfusion. Denies any epistaxis, hematemesis, melena or bright red blood per rectum. In the ER on admission WBC was 8.0, H&H 3.6/13.0, and platelets 424. CMP was normal.  Patient is status post packed red blood cells transfusions and his CBC today showed WBC 7.3, H&H 7.1/23, MCV 77, platelet 614. Differential was normal except for mild monocytosis. However repeat H&H at 8:05 AM showed hemoglobin 6.8 with a hematocrit of 23. DX   Encounter Diagnoses   Name Primary?  Symptomatic anemia Yes    Syncope, unspecified syncope type     Elevated troponin           Past Medical History:   Diagnosis Date    Ill-defined condition     Pt passes out  does not know why. happened 15 times gonzalez. Last time ; Aug 2018     Past Surgical History:   Procedure Laterality Date    HX CATARACT REMOVAL Left 01/24/2018     Social History     Socioeconomic History    Marital status:      Spouse name: Not on file    Number of children: Not on file    Years of education: Not on file    Highest education level: Not on file   Tobacco Use    Smoking status: Current Every Day Smoker     Packs/day: 0.50     Years: 45.00     Pack years: 22.50    Smokeless tobacco: Never Used Substance and Sexual Activity    Alcohol use: Yes     Alcohol/week: 16.8 oz     Types: 28 Cans of beer per week     Comment: 3-4 16 oz beers daily    Drug use: No     History reviewed. No pertinent family history. Current Facility-Administered Medications   Medication Dose Route Frequency Provider Last Rate Last Dose    zolpidem (AMBIEN) tablet 5 mg  5 mg Oral QHS PRN Abigail Frye MD        dextrose 5% - 0.45% NaCl with KCl 20 mEq/L infusion  100 mL/hr IntraVENous CONTINUOUS Abigail Frye  mL/hr at 06/14/19 0400 100 mL/hr at 06/14/19 0400    0.9% sodium chloride infusion 250 mL  250 mL IntraVENous PRN Ronen Valencia MD   Stopped at 06/13/19 0655    0.9% sodium chloride infusion 250 mL  250 mL IntraVENous PRN Mariposa Grow, DO        0.9% sodium chloride infusion 250 mL  250 mL IntraVENous PRN Mariposa Grow, DO        0.9% sodium chloride infusion 250 mL  250 mL IntraVENous PRN Mariposa Grow, DO        0.9% sodium chloride infusion 250 mL  250 mL IntraVENous PRN Fabian Elwillam H, DO           No Known Allergies    Review of Systems  All 12 point of review of system are negative except for what in the HPI. Objective:  Physical Exam:  Visit Vitals  /60 (BP 1 Location: Left arm, BP Patient Position: At rest)   Pulse 71   Temp 98.7 °F (37.1 °C)   Resp 18   Ht 5' 9.5\" (1.765 m)   Wt 89.4 kg (197 lb)   SpO2 96%   BMI 28.67 kg/m²       General:  Alert, cooperative, no distress, appears stated age. Head:  Normocephalic, without obvious abnormality, atraumatic. Eyes:  Conjunctivae/corneas clear. PERRL, EOMs intact. Throat: Lips, mucosa, and tongue normal.    Neck: Supple, symmetrical, trachea midline, no adenopathy, thyroid: no enlargement/tenderness/nodules   Back:   Symmetric, no curvature. ROM normal. No CVA tenderness. Lungs:   Clear to auscultation bilaterally. Chest wall:  No tenderness or deformity.    Heart:  Regular rate and rhythm, S1, S2 normal, no murmur, click, rub or gallop. Abdomen:   Soft, non-tender. Bowel sounds normal. No masses,  No organomegaly. Extremities: Extremities normal, atraumatic, no cyanosis or edema. Skin: Skin color, texture, turgor normal. No rashes or lesions. Lymph nodes: Cervical, supraclavicular, and axillary nodes normal.   Neurologic: CNII-XII intact. Diagnostic Imaging     Results for orders placed during the hospital encounter of 09/15/18   CT ABD PELV WO CONT    Addendum Addendum: Upon further review of ultrasound findings performed after this examination, previously described liver lesions likely represent  hemangiomas. Further evaluation with nonemergent liver protocol MRI should be  considered. Sebastian Dias MD 9/15/2018 11:10 AM          Narrative EXAM: CT of the abdomen and pelvis    INDICATION: Abdominal pain    COMPARISON: None. TECHNIQUE: Axial CT imaging of the abdomen and pelvis was performed without  intravenous contrast. Multiplanar reformats were generated. Dose reduction techniques used: Automated exposure control, adjustment of the  mAs and/or kVp according to patient's size, and iterative reconstruction  techniques. The specific techniques utilized on this CT exam have been  documented in the patient's electronic medical record.      _______________    FINDINGS:    LOWER CHEST: There is atelectasis or scarring in the right lung base. LIVER, BILIARY: Hepatic contour is nodular. Multiple hypodense ill-defined  masses are present within the left and right hepatic lobe. Largest mass is seen  within the superior right hepatic lobe. Inferior right hepatic lobe mass  measures 3.9 cm. No biliary dilation. There is cholelithiasis without wall  thickening or pericholecystic fluid. There is minimal stranding surrounding the  gallbladder. There may be a tiny calculus within the cystic duct.     PANCREAS: Normal.    SPLEEN: Normal.    ADRENALS: Normal.    KIDNEYS/URETERS/BLADDER: Normal.    PELVIC ORGANS: Unremarkable. VASCULATURE: Unremarkable    LYMPH NODES: Scattered prominent mesenteric nodes are present. There is an  enlarged left retroperitoneal periaortic node measuring 1.4 x 2 cm. GASTROINTESTINAL TRACT: No bowel dilation or wall thickening. BONES: No acute or aggressive osseous abnormalities identified. OTHER: None.    _______________      Impression IMPRESSION:    1. Cholelithiasis and questionable stranding surrounding the gallbladder. Imaging findings may represent acute cholecystitis or choledocholithiasis in the  appropriate clinical setting. 2. Hepatic morphology suggestive of cirrhosis    3. Numerous hepatic masses highly suspicious for metastatic disease. Recommend colonoscopy and other evaluation with liver protocol MRI    4. Nonspecific periaortic lymphadenopathy that may represent metastatic disease    Preliminary interpretation was provided by the on-call radiology resident. Lab Results  Lab Results   Component Value Date/Time    WBC 7.3 06/14/2019 01:50 AM    HGB 6.8 (L) 06/14/2019 08:05 AM    HCT 22.7 (L) 06/14/2019 08:05 AM    PLATELET 538 (H) 08/15/0233 01:50 AM    MCV 76.9 06/14/2019 01:50 AM       Lab Results   Component Value Date/Time    Sodium 136 06/13/2019 01:40 AM    Potassium 3.7 06/13/2019 01:40 AM    Chloride 104 06/13/2019 01:40 AM    CO2 20 (L) 06/13/2019 01:40 AM    Anion gap 12 06/13/2019 01:40 AM    Glucose 113 (H) 06/13/2019 01:40 AM    BUN 8 06/13/2019 01:40 AM    Creatinine 1.01 06/13/2019 01:40 AM    BUN/Creatinine ratio 8 (L) 06/13/2019 01:40 AM    GFR est AA >60 06/13/2019 01:40 AM    GFR est non-AA >60 06/13/2019 01:40 AM    Calcium 8.5 06/13/2019 01:40 AM    AST (SGOT) 27 04/16/2019 09:35 AM    Alk.  phosphatase 84 04/16/2019 09:35 AM    Protein, total 7.5 04/16/2019 09:35 AM    Albumin 3.5 04/16/2019 09:35 AM    Globulin 4.0 04/16/2019 09:35 AM    A-G Ratio 0.9 04/16/2019 09:35 AM    ALT (SGPT) 21 04/16/2019 09:35 AM Assessment/Plan:  80 y.o. male with   1. Acute blood loss anemia: Although FOBT is negative I am very suspicious that patient has occult GI bleed possibly from AVMs.,  Or other GI source. She has microcytosis with thrombocytosis which are likely reactive from iron deficiency due to long continue slow bleeding. Recommendations:  *Consult GI. Patient needs scope either inpatient or outpatient. He might also need capsule endoscopy if EGD/colonoscopy unrevealing. *Transfuse packed red blood cells to keep hemoglobin>=7.0  *I discussed with Knute Schaumann Repogle recommended to check haptoglobin, LDH and direct Elizabet to rule out hemolysis. Also recommended to order a peripheral blood smear to be reviewed by pathologist but also to check for ferritin, iron panel, B12 and folate. Aforementioned labs are pending. *I will give him IV iron with Venofer     2. Thrombocytosis: Likely reactive from iron deficiency. Has slightly improved with packed red blood cell transfusions. I will give IV iron and follow. If persist after replenishment of iron stores then I will rule out MPN. 3.  Microcytosis: She has microcytic anemia from acute blood loss leading to iron deficiency. Aa above. 4.  Elevated troponin: This is likely from troponin leak due to demand ischemia from his severe anemia. Thank you I will follow.   Patient to follow-up with me if discharge in 7 to 10 days in the outpatient clinic at [facility], [place]

## 2019-06-15 LAB
ALBUMIN SERPL-MCNC: 2.9 G/DL (ref 3.4–5)
ALBUMIN/GLOB SERPL: 0.7 {RATIO} (ref 0.8–1.7)
ALP SERPL-CCNC: 65 U/L (ref 45–117)
ALT SERPL-CCNC: 22 U/L (ref 16–61)
ANION GAP SERPL CALC-SCNC: 5 MMOL/L (ref 3–18)
AST SERPL-CCNC: 35 U/L (ref 15–37)
BILIRUB SERPL-MCNC: 0.5 MG/DL (ref 0.2–1)
BUN SERPL-MCNC: 5 MG/DL (ref 7–18)
BUN/CREAT SERPL: 6 (ref 12–20)
CALCIUM SERPL-MCNC: 8.3 MG/DL (ref 8.5–10.1)
CHLORIDE SERPL-SCNC: 110 MMOL/L (ref 100–108)
CO2 SERPL-SCNC: 26 MMOL/L (ref 21–32)
CREAT SERPL-MCNC: 0.83 MG/DL (ref 0.6–1.3)
GLOBULIN SER CALC-MCNC: 4.1 G/DL (ref 2–4)
GLUCOSE SERPL-MCNC: 109 MG/DL (ref 74–99)
HCT VFR BLD AUTO: 23.3 % (ref 36–48)
HCT VFR BLD AUTO: 23.8 % (ref 36–48)
HCT VFR BLD AUTO: 25.5 % (ref 36–48)
HCT VFR BLD AUTO: 26.2 % (ref 36–48)
HGB BLD-MCNC: 6.9 G/DL (ref 13–16)
HGB BLD-MCNC: 7.2 G/DL (ref 13–16)
HGB BLD-MCNC: 7.6 G/DL (ref 13–16)
HGB BLD-MCNC: 7.6 G/DL (ref 13–16)
POTASSIUM SERPL-SCNC: 3.6 MMOL/L (ref 3.5–5.5)
PROT SERPL-MCNC: 7 G/DL (ref 6.4–8.2)
SODIUM SERPL-SCNC: 141 MMOL/L (ref 136–145)

## 2019-06-15 PROCEDURE — 74011250636 HC RX REV CODE- 250/636: Performed by: HOSPITALIST

## 2019-06-15 PROCEDURE — 82105 ALPHA-FETOPROTEIN SERUM: CPT

## 2019-06-15 PROCEDURE — 85018 HEMOGLOBIN: CPT

## 2019-06-15 PROCEDURE — 65660000000 HC RM CCU STEPDOWN

## 2019-06-15 PROCEDURE — 36415 COLL VENOUS BLD VENIPUNCTURE: CPT

## 2019-06-15 PROCEDURE — 80053 COMPREHEN METABOLIC PANEL: CPT

## 2019-06-15 PROCEDURE — 74011250637 HC RX REV CODE- 250/637: Performed by: INTERNAL MEDICINE

## 2019-06-15 PROCEDURE — 74011250636 HC RX REV CODE- 250/636: Performed by: INTERNAL MEDICINE

## 2019-06-15 RX ORDER — BISACODYL 5 MG
10 TABLET, DELAYED RELEASE (ENTERIC COATED) ORAL
Status: COMPLETED | OUTPATIENT
Start: 2019-06-15 | End: 2019-06-15

## 2019-06-15 RX ADMIN — DEXTROSE MONOHYDRATE, SODIUM CHLORIDE, AND POTASSIUM CHLORIDE 100 ML/HR: 50; 4.5; 1.49 INJECTION, SOLUTION INTRAVENOUS at 00:44

## 2019-06-15 RX ADMIN — BISACODYL 10 MG: 5 TABLET, COATED ORAL at 18:04

## 2019-06-15 RX ADMIN — DEXTROSE MONOHYDRATE, SODIUM CHLORIDE, AND POTASSIUM CHLORIDE 100 ML/HR: 50; 4.5; 1.49 INJECTION, SOLUTION INTRAVENOUS at 22:19

## 2019-06-15 RX ADMIN — IRON SUCROSE 300 MG: 20 INJECTION, SOLUTION INTRAVENOUS at 15:48

## 2019-06-15 NOTE — PROGRESS NOTES
Problem: Discharge Planning  Goal: *Discharge to safe environment  Outcome: Progressing Towards Goal   Plan is to TBD

## 2019-06-15 NOTE — PROGRESS NOTES
PROGRESS NOTE   PATIENT:  Nasim Bundy           MRN: [id number]           [facility], [address]           6/15/2019, 8:08 AM      I  Mr. Caryle Book is a 80 y.o. male who is being seen for symptomatic  Anemia     SUBJECTIVE:  Pt feels better. Has no complaints. OBJECTIVE:  Patient Vitals for the past 24 hrs:   Temp Pulse Resp BP SpO2   06/15/19 0800 98.6 °F (37 °C) 70 18 113/61 95 %   06/15/19 0506 98.2 °F (36.8 °C) 68 18 116/63 98 %   06/15/19 0001 98.2 °F (36.8 °C) 64 18 125/57 97 %   06/14/19 1954 98.1 °F (36.7 °C) 69 18 123/71 98 %   06/14/19 1530 99 °F (37.2 °C) 74 18 119/63 95 %   06/14/19 1245 97.7 °F (36.5 °C) (!) 55 18 (!) 143/94 98 %   06/14/19 1124 98.7 °F (37.1 °C) 71 18 110/60 96 %   06/14/19 0905 98.9 °F (37.2 °C) 73 18 117/51 96 %         Intake/Output Summary (Last 24 hours) at 6/15/2019 0808  Last data filed at 6/15/2019 0659  Gross per 24 hour   Intake 3508.33 ml   Output 3310 ml   Net 198.33 ml       Gen: NAD  Heent: Mild  pallor, No icterus  Abd  : Soft, non tender, BS +, No masses felt. Labs: Results:   Chemistry Recent Labs     06/13/19  0140   *      K 3.7      CO2 20*   BUN 8   CREA 1.01   CA 8.5   AGAP 12   BUCR 8*    Estimated Creatinine Clearance: 79.2 mL/min (based on SCr of 1.01 mg/dL). CBC w/Diff Recent Labs     06/15/19  0405 06/14/19 2015 06/14/19  1328  06/14/19  0150  06/13/19  0140   WBC  --   --  7.4  --  7.3  --  8.0   RBC  --   --  2.91*  --  2.95*  --  1.77*   HGB 6.9* 7.3* 6.8*  6.9*   < > 7.1*   < > 3.6*   HCT 23.3* 24.7* 22.9*  22.7*   < > 22.7*   < > 13.0*   PLT  --   --  495*  --  559*  --  627*   GRANS  --   --  68  --  58  --  55   LYMPH  --   --  15*  --  13*  --  28   EOS  --   --  2  --  1  --  0    < > = values in this interval not displayed. Cardiac Enzymes Recent Labs     06/13/19  0140      CKND1 0.5      Coagulation No results for input(s): PTP, INR, APTT in the last 72 hours.     No lab exists for component: INREXT    Hepatitis Panel No results found for: HAMAT, HAAB, HABT, HAAT, HBSAG, HBSB, HBSAT, HBABN, HBCM, HBCAB, HBCAT, XBCABS, HBEAB, HBEAG, XHEPCS, [id number], HBEGLT, HBCMLT, HBCLT, HBEBLT, [id number], [id number], HAVMLT, [id number], HBCMLT, [id number], HCGAT   Amylase Lipase    Liver Enzymes No results for input(s): TP, ALB, TBILI, AP, SGOT, ALT in the last 72 hours. No lab exists for component: DBIL   Thyroid Studies No results for input(s): T4, T3U, TSH, TSHEXT in the last 72 hours. No lab exists for component: T3RU     Pathology pathology         No Known Allergies    Current Facility-Administered Medications   Medication Dose Route Frequency    iron sucrose (VENOFER) 300 mg in 0.9% sodium chloride 250 mL IVPB  300 mg IntraVENous Q24H    zolpidem (AMBIEN) tablet 5 mg  5 mg Oral QHS PRN    dextrose 5% - 0.45% NaCl with KCl 20 mEq/L infusion  100 mL/hr IntraVENous CONTINUOUS    0.9% sodium chloride infusion 250 mL  250 mL IntraVENous PRN    0.9% sodium chloride infusion 250 mL  250 mL IntraVENous PRN    0.9% sodium chloride infusion 250 mL  250 mL IntraVENous PRN    0.9% sodium chloride infusion 250 mL  250 mL IntraVENous PRN    0.9% sodium chloride infusion 250 mL  250 mL IntraVENous PRN       ASSESSMENT:  1. Profound symptomatic  anemia. Has CYNTHIA, but could be multifactorial. No obvious bleeding. Hematology following. 2.  Probable metastatic disease in the liver on the CT scan, although I now see an addendum on the CT scan results that indicate the previously-described liver lesions likely resemble hemangiomas. RECOMMENDATIONS:   1. EGD and colonoscopy on Monday  2. Will check AFP  3. Monitor Hb and transfuse as needed.      Indira Dunbar MD

## 2019-06-15 NOTE — PROGRESS NOTES
Assumed care of pt from BELGICA Sun pt awake in bed A&O x 4 , no distress noted and denies pain. Frequently used items and call bell within reach. Patient verbalized understanding to use call bell for any needs or assistance. Bed locked in lowest position. Pt had a uneventful day with no complaints of pain. Bedside and Verbal shift change report given to Katarina Booth RN (oncoming nurse) by Hina Davies RN (offgoing nurse). Report included the following information SBAR, Kardex, Intake/Output, MAR and Recent Results.

## 2019-06-15 NOTE — PROGRESS NOTES
Problem: Discharge Planning  Goal: *Discharge to safe environment  Outcome: Progressing Towards Goal   Plan is to TBD    Reason for Admission:   Syncope/ anemia                   RRAT Score:    12                 Plan for utilizing home health:   tbd                       Current Advanced Directive/Advance Care Plan: not yet, BUT PATIENT WOULD LIKE ONE, please call Faviola to arrange                         Transition of Care Plan:  TBD Home versus home with Home Health      Patient has designated __spouse__________ to participate in his/her discharge plan and to receive any needed information. Name: Jeannie Blas  Address: Not with Patient! Phone number:   [phone]       Care Management Interventions  PCP Verified by CM: No(use to go to [facility], but now they don't take his insurance, his [facility] worker is trying to get him a new PCP )  Palliative Care Criteria Met (RRAT>21 & CHF Dx)?: No  Mode of Transport at Discharge: Other (see comment)(Step daughter Emili Villalobos )  Transition of Care Consult (CM Consult): Discharge Planning  MyChart Signup: No  Discharge Durable Medical Equipment: No  Physical Therapy Consult: No  Occupational Therapy Consult: No  Speech Therapy Consult: No  Current Support Network: Lives Alone  Confirm Follow Up Transport: Family(stepdaughter)  Plan discussed with Pt/Family/Caregiver: Yes     Chart reviewed and patient veriifed demographics. Patient lives in an apartment on first floor. [facility]. His [facility] worker name is Manish Cervantes. I did not get to call her at this late time. He is working with Alma Rosa Ohm to get a new PCP that accepts his insurance. He lives alone and had been mostly homebound. His insurance provides transportation to and from medical appts but he needs to set it up 3 days ahead of time. He does not live with his wife. His niece lives next door to him and comes    To check on him all the time. His son comes over sometimes.  His step daughter  [employer] Officer Sivlia Quick will drive him home at dc. He kept saying \" the Doctors don't know where th bld went. \"   He was very anemic and may get GI testing while here to see if there is a chronic bleed there. He was found to have hepatic masses and lymhadenopathy for possible Cancer. He is to make appt with  Heme Onc MD after dc. He will need a PCP to follow up with. Plan is probable home, probable with home health, maybe Skilled RN. He may need Personal care. Christina Jones.  Suni Adler, BSN  [facility]  [phone], Pager [phone]

## 2019-06-15 NOTE — ROUTINE PROCESS
1940  Bedside and Verbal shift change report given to Edna (oncoming nurse) by Nicole Lincoln (offgoing nurse). Report included the following information SBAR, Kardex, Intake/Output and MAR. Pt awake and alert at this time. Hemoglobin 6.9 at this time, q6h H&H law draws currently ordered. Per offgoing RN, Dr. Jessica Carroll wants to continue to monitor - no new orders at this time.

## 2019-06-15 NOTE — CONSULTS
[address] [place]    Name:  Vladimir Villa  MR#:   [id number]  :  1950  ACCOUNT #:  [de-identified]  DATE OF SERVICE:  2019    GASTROENTEROLOGY CONSULTATION    REFERRING PHYSICIAN:  Yoli Mtz MD    CONSULTING PHYSICIAN:  Rabia Causey. MD Funmi    REASON FOR CONSULTATION:  I was asked to see the patient because of his iron-deficiency anemia. HISTORY OF PRESENT ILLNESS:  This is a 55-year-old male, who indicates that he received 3 units of packed red blood cells 3 weeks ago through the emergency room when he came to the emergency room for evaluation of severe dyspnea on exertion and poor exercise intolerance, and was found to have a low hemoglobin. He was advised to see his primary care physician for further evaluation and he was discharged from the emergency room. The patient was admitted to the hospital yesterday after developing a syncopal episode. He has had no diaphoresis, chest pain, or shortness of breath. He did not feel as though his exertional dyspnea has worsened to the point it had been when he was in the ER several weeks ago and received 3 units of packed red blood cells. In the ER, previously his hemoglobin was actually 3.6. He is receiving 3 units of packed red blood cells. His hemoglobin now is 6.9 and he is resting comfortably in bed. He denies the use od NSAIDs. He denies any history of melena or hematochezia. He did not take ibuprofen, Excedrin or Anacin except for headaches and aches and pains. He indicates he does not take over-the-counter medications at all. He had a FOBT or Cologuard test for colon cancer screening a year ago by his report. He has never had endoscopic evaluation of the colon. He denies any history of hepatitis, jaundice, chronic liver disease, or chronically elevated liver function tests. He has no history of acid reflux symptoms. No trouble swallowing or trouble with nausea, vomiting, abdominal pain.   His appetite has been good. His weight is stable. He denies constipation or diarrhea until 3 weeks ago. He has never had anemia in the past.    PAST MEDICAL HISTORY:  Significant for no chronic medical illnesses. ALLERGIES:  NONE KNOWN. MEDICATIONS AT THE TIME OF ADMISSION:  None. SOCIAL HISTORY:  Retired after working for the [employer]. 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 He also used to work for [employer]. He has been a smoker for many years, a-pack-a-day on average. Alcohol; social, not heavy. He is single. FAMILY HISTORY:  Mother with breast cancer. Father with a brain tumor. Negative for colon cancer, polyps, inflammatory bowel disease, or chronic liver disease to his knowledge. REVIEW OF SYSTEMS:  A complete review was taken, positive as per history of present illness, negatives not listed here. PHYSICAL EXAMINATION:  GENERAL:  The patient is a pleasant male, eating his regular dinner, in no obvious distress. He is edentulous. NECK:  Supple. No mass or thyroid palpable. NODES:  No supraclavicular, axillary or cervical nodes palpable. CHEST:  Clear to auscultation, symmetric breath sounds. Good air movement. HEART:  First and second heart sounds are normal.  No murmurs, rubs, or gallops. ABDOMEN:  Soft, nontender. No organomegaly or masses palpable. Bowel sounds are normal.  There are no bruits. EXTREMITIES:  No pedal edema. RECTAL:  Not performed. LABORATORY STUDIES:  From today; his B12 is normal at 620. His folic acid level was 82.0. Iron is low at 14, TIBC 404, percent saturation 3% with a ferritin low at 9. His BMP today abnormal for a bicarb of 20, glucose of 113, and calcium now at 8.5. Liver enzymes 04/16/2013, bilirubin 0.3, total protein 7.5 with an albumin of 3.5, ALT 21, AST 27, alkalike phosphatase is 84. On 06/13, which was yesterday, his hemoglobin was 3.6, hematocrit of 13, and his MCV was 73.4.   At 01:50 this morning, hemoglobin and hematocrit were 7.1 and 22.7 with an MCV of 76.9. CT scan of the abdomen and pelvis 09/15/2018: Indication was abdominal pain. Hepatic contour was nodular. Multiple hyperdensities, ill-defied masses present in left and right hepatic lobe, largest seen in the superior right hepatic lobe. Inferior right hepatic mass measuring 3.9 cm. No bile duct dilatation. There are gallstones without wall thickening or pericholecystic fluid. Minimal stranding surrounding the gallbladder. Pancreas and spleen normal.  Lymph nodes are scattered. Prominent mesenteric lymph nodes were present. Enlarged left retroperitoneal paraaortic lymph node. No bile dilatation or wall thickening. The hepatic mass was suspicious for metastatic disease. IMPRESSION:  1. Profound anemia. It may well be secondary to bone marrow infiltration by tumor in addition to iron deficiency. Hemolysis is also a possibility, although normal total bilirubin and normal AST makes this less likely. 2.  Probable metastatic disease in the liver on the CT scan, although I now see an addendum on the CT scan results that indicate the previously-described liver lesions likely resemble hemangiomas. Further evaluation with nonemergent liver protocol MRI was thought to be appropriate to further clarify. It is not clear from talking to the patient whether that non-emergent MRI with liver protocol was ever performed. 3.  Long history of smoking. PLAN:  1. Monitor hemoglobin. 2.  Colonoscopy, upper endoscopy, push enteroscopy because of iron-deficiency anemia will be scheduled for Monday. Depending on his findings, a small bowel x-ray and capsule endoscopy may be necessary. 3.  The patent most likely will require oral iron at the time of discharge in order to throw the hemoglobin, hematocrit and ferritin back into the normal range and ferritin is greater than 100.  4.  Continue to avoid NSAIDs.   5.  We will need to determine if the patient ever had the MRI with liver protocol to verify that the liver lesions initially thought to represent metastatic lesions are in fact hemangiomas.       Micah Hinojosa MD      [id number]  D:  06/14/2019 17:58  T:  06/14/2019 20:37  JOB #:  [id number]  CC:  Vinod Batista MD

## 2019-06-15 NOTE — PROGRESS NOTES
Internal Medicine Progress Note    Patient's Name: Darin Acosta Date: 6/13/2019  Length of Stay: 2      Assessment/Plan     Principal Problem:    Syncope (8/4/2018)    Active Problems:    Acute blood loss anemia (6/13/2019)    Syncope  - likely 2/2 anemia  - cardiac monitoring  - echo done, results below    Anemia  - q6 H&H  - hematology consult - appreciate  - GI consult - appreciate - plan for upper/lower endoscopy on monday  - IV venofer  - b12, folate, ferritin WNL  - direct itz negative  - haptoglobin, smear pending  - PO iron at discharge      - Cont acceptable home medications for chronic conditions   - DVT protocol    I have personally reviewed all pertinent labs and films that have officially resulted over the last 24 hours. I have personally checked for all pending labs that are awaiting final results. Interval History     Per H&P, \"Clay Arndt is a 80 y.o. male who presented to the ER after experiencing syncope at home. He was eating ice and put the cup down on the table and reports, \"then I passed out and fell down and hit my head on the leg of the table. \"  He reports that he has had severe anemia in the past but that he has not had a cause of his anemia identified. He has had syncope multiple times in the past.  He reports that in 2015 he had a traumatic brain injury and had \"blood on the brain. \"  The injury occurred when he fell down 14 steps and hit his head. He does not have chest pain or SOB. He does not have GI bleeding symptoms. In the ER he is found to have a hemoglobin of 3.6. He is getting blood transfusion and will be admitted for ongoing management. \"    Patient had a similar episode in April 2019, was given 2-3 units PRBCs in ED and sent home. He denies hematochezia, melena. Hematology consulted - suspicious of GIB, recommends GI consult for upper/lower endoscopy, as well as lab w/u to evaluate other potential causes of anemia. Subjective     Pt s/e @ bedside. No major events overnight. Pt offers no complaints this AM. Denies CP or SOB. Denies abd pain, nvd. Objective     Visit Vitals  /61 (BP 1 Location: Left arm, BP Patient Position: At rest)   Pulse 70   Temp 98.6 °F (37 °C)   Resp 18   Ht 5' 9.5\" (1.765 m)   Wt 89.4 kg (197 lb)   SpO2 95%   BMI 28.67 kg/m²       Physical Exam:  General Appearance: NAD, conversant  HENT: normocephalic/atraumatic, moist mucus membranes  Neck: No JVD, supple  Lungs: CTA with normal respiratory effort  CV: RRR, no m/r/g  Abdomen: soft, non-tender, normal bowel sounds  Extremities: no cyanosis, no peripheral edema  Neuro: No focal deficits, motor/sensory intact  Skin: Normal color, intact      Intake and Output:  Current Shift:  No intake/output data recorded. Last three shifts:  06/13 1901 - 06/15 0700  In: 6631.7 [P.O.:1300; I.V.:5070]  Out: 4485 [Urine:4485]    Lab/Data Reviewed:  CBC:   Lab Results   Component Value Date/Time    WBC 7.4 06/14/2019 01:28 PM    HGB 7.2 (L) 06/15/2019 10:30 AM    HCT 23.8 (L) 06/15/2019 10:30 AM     (H) 06/14/2019 01:28 PM       Imaging Reviewed:  No results found. Echo 6/13/19  Interpretation Summary     · Left Ventricle: Mildly dilated left ventricle. Mild systolic dysfunction. Estimated left ventricular ejection fraction is 46 - 50%. Visually measured ejection fraction. Left ventricular global hypokinesis. Severe (grade 3) left ventricular diastolic dysfunction. · Left Atrium: Mildly dilated left atrium. · Aortic Valve: Mild aortic valve sclerosis with no evidence of reduced excursion. · Mitral Valve: Mitral valve non-specific thickening. Trace mitral valve regurgitation. · IVC/Hepatic Veins: Mildly elevated central venous pressure (5-10 mmHg); IVC diameter is less than 21 mm and collapses less than 50% with respiration. Echo Findings     Left Ventricle Normal wall thickness. Mildly dilated left ventricle. Mild systolic dysfunction.  The estimated ejection fraction is 46 - 50%. Visually measured ejection fraction. Global hypokinesis observed. There is severe (grade 3) left ventricular diastolic dysfunction. Left Atrium Mildly dilated left atrium. Right Ventricle Normal cavity size and global systolic function. Right Atrium Normal cavity size. Interatrial Septum Saline contrast not used. Aortic Valve No stenosis and no regurgitation. Mild aortic valve sclerosis with no evidence of reduced excursion. Mitral Valve No stenosis. Mitral valve non-specific thickening. w/o reduced excursion. Trace regurgitation. Tricuspid Valve Normal valve structure and no stenosis. Tricuspid regurgitation is inadequate for estimation of right ventricular systolic pressure. Pulmonary arterial systolic pressure is 89.0 mmHg. Pulmonic Valve Pulmonic valve normal doppler findings. Aorta Normal aortic root, ascending aortic, and aortic arch. Pulmonary Artery Pulmonary arteries not well visualized. IVC/Hepatic Veins Normal structure. Mildly elevated central venous pressure (5-10 mmHg); IVC diameter is less than 21 mm and collapses less than 50% with respiration. Pericardium Normal pericardium and no evidence of pericardial effusion.        Medications Reviewed:  Current Facility-Administered Medications   Medication Dose Route Frequency    bisacodyl (DULCOLAX) tablet 10 mg  10 mg Oral NOW    iron sucrose (VENOFER) 300 mg in 0.9% sodium chloride 250 mL IVPB  300 mg IntraVENous Q24H    zolpidem (AMBIEN) tablet 5 mg  5 mg Oral QHS PRN    dextrose 5% - 0.45% NaCl with KCl 20 mEq/L infusion  100 mL/hr IntraVENous CONTINUOUS    0.9% sodium chloride infusion 250 mL  250 mL IntraVENous PRN    0.9% sodium chloride infusion 250 mL  250 mL IntraVENous PRN    0.9% sodium chloride infusion 250 mL  250 mL IntraVENous PRN    0.9% sodium chloride infusion 250 mL  250 mL IntraVENous PRN    0.9% sodium chloride infusion 250 mL  250 mL IntraVENous PRN         [facility]  Hospitalist Division  Office:  [phone]  Pager: [phone]

## 2019-06-15 NOTE — PROGRESS NOTES
1900  -- Bedside, Verbal and Written shift change report given to [address] (oncoming nurse) by [facility] nurse). Report included the following information SBAR, Kardex, Intake/Output, MAR and Recent Results. 0000 -- Shift reassessment, pt condition unchanged, will continue to monitor.      0400 --  Shift reassessment, pt condition unchanged, will continue to monitor.        0700  -- Bedside, Verbal and Written shift change report given to [address]) by QIAN (offgoing nurse). Report included the following information SBAR, Kardex, Intake/Output, MAR and Recent Results. Skin assessment completed.

## 2019-06-16 LAB
AFP-TM SERPL-MCNC: 2.5 NG/ML (ref 0–8.3)
HCT VFR BLD AUTO: 25.1 % (ref 36–48)
HCT VFR BLD AUTO: 25.7 % (ref 36–48)
HCT VFR BLD AUTO: 27.7 % (ref 36–48)
HGB BLD-MCNC: 7.2 G/DL (ref 13–16)
HGB BLD-MCNC: 7.5 G/DL (ref 13–16)
HGB BLD-MCNC: 8.2 G/DL (ref 13–16)

## 2019-06-16 PROCEDURE — 74011000250 HC RX REV CODE- 250: Performed by: INTERNAL MEDICINE

## 2019-06-16 PROCEDURE — 36415 COLL VENOUS BLD VENIPUNCTURE: CPT

## 2019-06-16 PROCEDURE — 65660000000 HC RM CCU STEPDOWN

## 2019-06-16 PROCEDURE — 74011250636 HC RX REV CODE- 250/636: Performed by: INTERNAL MEDICINE

## 2019-06-16 PROCEDURE — 85018 HEMOGLOBIN: CPT

## 2019-06-16 PROCEDURE — 74011250637 HC RX REV CODE- 250/637: Performed by: INTERNAL MEDICINE

## 2019-06-16 RX ORDER — POLYETHYLENE GLYCOL 3350, SODIUM SULFATE, SODIUM CHLORIDE, POTASSIUM CHLORIDE, SODIUM ASCORBATE, AND ASCORBIC ACID 7.5-2.691G
1 KIT ORAL EVERY 12 HOURS
Status: COMPLETED | OUTPATIENT
Start: 2019-06-16 | End: 2019-06-17

## 2019-06-16 RX ORDER — BISACODYL 5 MG
10 TABLET, DELAYED RELEASE (ENTERIC COATED) ORAL ONCE
Status: COMPLETED | OUTPATIENT
Start: 2019-06-16 | End: 2019-06-16

## 2019-06-16 RX ADMIN — BISACODYL 10 MG: 5 TABLET, COATED ORAL at 16:00

## 2019-06-16 RX ADMIN — POLYETHYLENE GLYCOL 3350, SODIUM SULFATE, SODIUM CHLORIDE, POTASSIUM CHLORIDE, ASCORBIC ACID, SODIUM ASCORBATE 1 L: KIT at 17:29

## 2019-06-16 RX ADMIN — IRON SUCROSE 300 MG: 20 INJECTION, SOLUTION INTRAVENOUS at 15:15

## 2019-06-16 NOTE — PROGRESS NOTES
Internal Medicine Progress Note    Patient's Name: Kika Abbott Date: 6/13/2019  Length of Stay: 3      Assessment/Plan     Principal Problem:    Syncope (8/4/2018)    Active Problems:    Acute blood loss anemia (6/13/2019)    Syncope  - likely 2/2 anemia  - cardiac monitoring  - echo done, results below    Anemia  - q12 H&H  - hematology consult - appreciate  - GI consult - appreciate - plan for upper/lower endoscopy tomorrow  - IV venofer  - b12, folate, ferritin WNL  - direct itz negative  - haptoglobin, smear pending  - PO iron at discharge      - Cont acceptable home medications for chronic conditions   - DVT protocol    I have personally reviewed all pertinent labs and films that have officially resulted over the last 24 hours. I have personally checked for all pending labs that are awaiting final results. Interval History     Per H&P, \"Clay Arndt is a 80 y.o. male who presented to the ER after experiencing syncope at home. He was eating ice and put the cup down on the table and reports, \"then I passed out and fell down and hit my head on the leg of the table. \"  He reports that he has had severe anemia in the past but that he has not had a cause of his anemia identified. He has had syncope multiple times in the past.  He reports that in 2015 he had a traumatic brain injury and had \"blood on the brain. \"  The injury occurred when he fell down 14 steps and hit his head. He does not have chest pain or SOB. He does not have GI bleeding symptoms. In the ER he is found to have a hemoglobin of 3.6. He is getting blood transfusion and will be admitted for ongoing management. \"    Patient had a similar episode in April 2019, was given 2-3 units PRBCs in ED and sent home. He denies hematochezia, melena. Hematology consulted - suspicious of GIB, recommends GI consult for upper/lower endoscopy, as well as lab w/u to evaluate other potential causes of anemia. Subjective     Pt s/e @ bedside. No major events overnight. Pt offers no complaints this AM. Denies CP or SOB. Denies abd pain, nvd. Objective     Visit Vitals  /66 (BP 1 Location: Right arm, BP Patient Position: At rest)   Pulse 71   Temp 98.3 °F (36.8 °C)   Resp 18   Ht 5' 9.5\" (1.765 m)   Wt 89.4 kg (197 lb)   SpO2 96%   BMI 28.67 kg/m²       Physical Exam:  General Appearance: NAD, conversant  HENT: normocephalic/atraumatic, moist mucus membranes  Lungs: CTA with normal respiratory effort  CV: RRR, no m/r/g  Abdomen: soft, non-tender, normal bowel sounds  Extremities: no cyanosis, no peripheral edema  Skin: Normal color, intact      Intake and Output:  Current Shift:  06/16 0701 - 06/16 1900  In: -   Out: 900 [Urine:900]  Last three shifts:  06/14 1901 - 06/16 0700  In: 4863.3 [P.O.:940; I.V.:3923.3]  Out: 4200 [Urine:4200]    Lab/Data Reviewed:  CBC:   Lab Results   Component Value Date/Time    HGB 7.2 (L) 06/16/2019 07:50 AM    HCT 25.1 (L) 06/16/2019 07:50 AM       Imaging Reviewed:  No results found. Echo 6/13/19  Interpretation Summary     · Left Ventricle: Mildly dilated left ventricle. Mild systolic dysfunction. Estimated left ventricular ejection fraction is 46 - 50%. Visually measured ejection fraction. Left ventricular global hypokinesis. Severe (grade 3) left ventricular diastolic dysfunction. · Left Atrium: Mildly dilated left atrium. · Aortic Valve: Mild aortic valve sclerosis with no evidence of reduced excursion. · Mitral Valve: Mitral valve non-specific thickening. Trace mitral valve regurgitation. · IVC/Hepatic Veins: Mildly elevated central venous pressure (5-10 mmHg); IVC diameter is less than 21 mm and collapses less than 50% with respiration. Echo Findings     Left Ventricle Normal wall thickness. Mildly dilated left ventricle. Mild systolic dysfunction. The estimated ejection fraction is 46 - 50%. Visually measured ejection fraction. Global hypokinesis observed.  There is severe (grade 3) left ventricular diastolic dysfunction. Left Atrium Mildly dilated left atrium. Right Ventricle Normal cavity size and global systolic function. Right Atrium Normal cavity size. Interatrial Septum Saline contrast not used. Aortic Valve No stenosis and no regurgitation. Mild aortic valve sclerosis with no evidence of reduced excursion. Mitral Valve No stenosis. Mitral valve non-specific thickening. w/o reduced excursion. Trace regurgitation. Tricuspid Valve Normal valve structure and no stenosis. Tricuspid regurgitation is inadequate for estimation of right ventricular systolic pressure. Pulmonary arterial systolic pressure is 35.4 mmHg. Pulmonic Valve Pulmonic valve normal doppler findings. Aorta Normal aortic root, ascending aortic, and aortic arch. Pulmonary Artery Pulmonary arteries not well visualized. IVC/Hepatic Veins Normal structure. Mildly elevated central venous pressure (5-10 mmHg); IVC diameter is less than 21 mm and collapses less than 50% with respiration. Pericardium Normal pericardium and no evidence of pericardial effusion.        Medications Reviewed:  Current Facility-Administered Medications   Medication Dose Route Frequency    peg 3350-Electrolytes-Vit C (MOVIPREP) oral solution 1 L  1 L Oral Q12H    bisacodyl (DULCOLAX) tablet 10 mg  10 mg Oral ONCE    iron sucrose (VENOFER) 300 mg in 0.9% sodium chloride 250 mL IVPB  300 mg IntraVENous Q24H    zolpidem (AMBIEN) tablet 5 mg  5 mg Oral QHS PRN    0.9% sodium chloride infusion 250 mL  250 mL IntraVENous PRN    0.9% sodium chloride infusion 250 mL  250 mL IntraVENous PRN    0.9% sodium chloride infusion 250 mL  250 mL IntraVENous PRN    0.9% sodium chloride infusion 250 mL  250 mL IntraVENous PRN    0.9% sodium chloride infusion 250 mL  250 mL IntraVENous PRN         Krish Salter PA-C  [facility]  Hospitalist Division  Office:  [phone]  Pager: [phone]

## 2019-06-16 NOTE — PROGRESS NOTES
PROGRESS NOTE   PATIENT:  Karel Narvaez           MRN: [id number]           [facility], [address][birth year][address]           6/16/2019, 8:08 AM      I  Mr. Delmer Luque is a 80 y.o. male who is being seen for symptomatic  Anemia     SUBJECTIVE:  Pt feels better. Has no complaints. OBJECTIVE:  Patient Vitals for the past 24 hrs:   Temp Pulse Resp BP SpO2   06/16/19 1042 98.1 °F (36.7 °C) 71 18 119/73 96 %   06/16/19 0735 98.3 °F (36.8 °C) 71 18 130/66 96 %   06/16/19 0400 98.6 °F (37 °C) 72 18 120/60 100 %   06/15/19 2048 98.3 °F (36.8 °C) 68 17 111/62 99 %   06/15/19 1534 98.2 °F (36.8 °C) 89 17 109/62 96 %   06/15/19 1142 99.1 °F (37.3 °C) 76 18 116/61 95 %         Intake/Output Summary (Last 24 hours) at 6/16/2019 1117  Last data filed at 6/16/2019 0736  Gross per 24 hour   Intake 3324.99 ml   Output 2300 ml   Net 1024.99 ml       Gen: NAD  Heent: Mild  pallor, No icterus  Abd  : Soft, non tender, BS +, No masses felt. Labs: Results:   Chemistry Recent Labs     06/15/19  1030   *      K 3.6   *   CO2 26   BUN 5*   CREA 0.83   CA 8.3*   AGAP 5   BUCR 6*   AP 65   TP 7.0   ALB 2.9*   GLOB 4.1*   AGRAT 0.7*    Estimated Creatinine Clearance: 96.4 mL/min (based on SCr of 0.83 mg/dL). CBC w/Diff Recent Labs     06/16/19  0750 06/16/19  0346 06/15/19  2047  06/14/19  1328  06/14/19  0150   WBC  --   --   --   --  7.4  --  7.3   RBC  --   --   --   --  2.91*  --  2.95*   HGB 7.2* 7.5* 7.6*   < > 6.8*  6.9*   < > 7.1*   HCT 25.1* 25.7* 25.5*   < > 22.9*  22.7*   < > 22.7*   PLT  --   --   --   --  495*  --  559*   GRANS  --   --   --   --  68  --  58   LYMPH  --   --   --   --  15*  --  13*   EOS  --   --   --   --  2  --  1    < > = values in this interval not displayed. Cardiac Enzymes No results for input(s): CPK, CKND1, YONATAN in the last 72 hours. No lab exists for component: CKRMB, TROIP   Coagulation No results for input(s): PTP, INR, APTT in the last 72 hours.     No lab exists for component: INREXT, INREXT    Hepatitis Panel No results found for: HAMAT, HAAB, HABT, HAAT, HBSAG, HBSB, HBSAT, HBABN, HBCM, HBCAB, HBCAT, XBCABS, HBEAB, HBEAG, XHEPCS, [id number], HBEGLT, HBCMLT, HBCLT, HBEBLT, [id number], [id number], HAVMLT, [id number], HBCMLT, [id number], HCGAT   Amylase Lipase    Liver Enzymes Recent Labs     06/15/19  1030   TP 7.0   ALB 2.9*   TBILI 0.5   AP 65   SGOT 35   ALT 22      Thyroid Studies No results for input(s): T4, T3U, TSH, TSHEXT, TSHEXT in the last 72 hours. No lab exists for component: T3RU     Pathology pathology         No Known Allergies    Current Facility-Administered Medications   Medication Dose Route Frequency    peg 3350-Electrolytes-Vit C (MOVIPREP) oral solution 1 L  1 L Oral Q12H    bisacodyl (DULCOLAX) tablet 10 mg  10 mg Oral ONCE    iron sucrose (VENOFER) 300 mg in 0.9% sodium chloride 250 mL IVPB  300 mg IntraVENous Q24H    zolpidem (AMBIEN) tablet 5 mg  5 mg Oral QHS PRN    dextrose 5% - 0.45% NaCl with KCl 20 mEq/L infusion  100 mL/hr IntraVENous CONTINUOUS    0.9% sodium chloride infusion 250 mL  250 mL IntraVENous PRN    0.9% sodium chloride infusion 250 mL  250 mL IntraVENous PRN    0.9% sodium chloride infusion 250 mL  250 mL IntraVENous PRN    0.9% sodium chloride infusion 250 mL  250 mL IntraVENous PRN    0.9% sodium chloride infusion 250 mL  250 mL IntraVENous PRN       ASSESSMENT:  1. Profound symptomatic  anemia. Has CYNTHIA, but could be multifactorial. No obvious bleeding. Hematology following. 2.  Probable metastatic disease in the liver on the CT scan, although I now see an addendum on the CT scan results that indicate the previously-described liver lesions likely resemble hemangiomas. RECOMMENDATIONS:   1. EGD and colonoscopy on Monday  2. AFP Pending. LFT';s normal   3. Monitor Hb and transfuse as needed.      Mari Lu MD

## 2019-06-16 NOTE — PROGRESS NOTES
Problem: Falls - Risk of  Goal: *Absence of Falls  Description  Document Clide Failing Fall Risk and appropriate interventions in the flowsheet.   Outcome: Progressing Towards Goal     Problem: Patient Education: Go to Patient Education Activity  Goal: Patient/Family Education  Outcome: Progressing Towards Goal     Problem: Anemia Care Plan (Adult and Pediatric)  Goal: *Labs within defined limits  Outcome: Progressing Towards Goal  Goal: *Tolerates increased activity  Outcome: Progressing Towards Goal     Problem: Patient Education: Go to Patient Education Activity  Goal: Patient/Family Education  Outcome: Progressing Towards Goal     Problem: Discharge Planning  Goal: *Discharge to safe environment  Outcome: Progressing Towards Goal     Problem: Pain  Goal: *Control of Pain  Outcome: Progressing Towards Goal     Problem: Patient Education: Go to Patient Education Activity  Goal: Patient/Family Education  Outcome: Progressing Towards Goal

## 2019-06-16 NOTE — PROGRESS NOTES
0700 bedside report received from Ms. [facility], RN. End of shift summary-No si & sx of bleeding noted. HH checked every 12 hrs. Bowel Prep initiated for tomorrow's test. Pt is NPO after midnight. Notify Angela Almanzar r/t latest Troponin 0.25. Angela Almanzar is aware & notfied. No action done at this time. K-3.6. Ms. Bryan Nathan is aware & notified. Bedside shift change report given to Oralia Morrison RN by Emeli Maloney RN. Report included the following information SBAR, Kardex, Recent Results, Med Rec Status and Alarm Parameters .

## 2019-06-17 ENCOUNTER — ANESTHESIA (OUTPATIENT)
Dept: ENDOSCOPY | Age: 69
DRG: 811 | End: 2019-06-17
Payer: MEDICARE

## 2019-06-17 ENCOUNTER — ANESTHESIA EVENT (OUTPATIENT)
Dept: ENDOSCOPY | Age: 69
DRG: 811 | End: 2019-06-17
Payer: MEDICARE

## 2019-06-17 VITALS
SYSTOLIC BLOOD PRESSURE: 131 MMHG | RESPIRATION RATE: 18 BRPM | HEIGHT: 70 IN | DIASTOLIC BLOOD PRESSURE: 66 MMHG | TEMPERATURE: 98.7 F | BODY MASS INDEX: 28.2 KG/M2 | OXYGEN SATURATION: 97 % | HEART RATE: 66 BPM | WEIGHT: 197 LBS

## 2019-06-17 LAB
HAPTOGLOB SERPL-MCNC: 189 MG/DL (ref 30–200)
HCT VFR BLD AUTO: 28.5 % (ref 36–48)
HGB BLD-MCNC: 8.2 G/DL (ref 13–16)
PERIPHERAL SMEAR,PSM: NORMAL

## 2019-06-17 PROCEDURE — 77030021593 HC FCPS BIOP ENDOSC BSC -A: Performed by: INTERNAL MEDICINE

## 2019-06-17 PROCEDURE — 76060000032 HC ANESTHESIA 0.5 TO 1 HR: Performed by: INTERNAL MEDICINE

## 2019-06-17 PROCEDURE — 76040000007: Performed by: INTERNAL MEDICINE

## 2019-06-17 PROCEDURE — 74011250636 HC RX REV CODE- 250/636: Performed by: INTERNAL MEDICINE

## 2019-06-17 PROCEDURE — 74011250636 HC RX REV CODE- 250/636

## 2019-06-17 PROCEDURE — 74011000250 HC RX REV CODE- 250: Performed by: INTERNAL MEDICINE

## 2019-06-17 PROCEDURE — 77030004927 HC CATH ELECHEMSTAS BSC -C: Performed by: INTERNAL MEDICINE

## 2019-06-17 PROCEDURE — 88342 IMHCHEM/IMCYTCHM 1ST ANTB: CPT

## 2019-06-17 PROCEDURE — 88312 SPECIAL STAINS GROUP 1: CPT

## 2019-06-17 PROCEDURE — 65660000000 HC RM CCU STEPDOWN

## 2019-06-17 PROCEDURE — 77030038604 HC SNR ENDO EXACTO USEN -B: Performed by: INTERNAL MEDICINE

## 2019-06-17 PROCEDURE — 0DBE8ZX EXCISION OF LARGE INTESTINE, VIA NATURAL OR ARTIFICIAL OPENING ENDOSCOPIC, DIAGNOSTIC: ICD-10-PCS | Performed by: INTERNAL MEDICINE

## 2019-06-17 PROCEDURE — 85018 HEMOGLOBIN: CPT

## 2019-06-17 PROCEDURE — 88305 TISSUE EXAM BY PATHOLOGIST: CPT

## 2019-06-17 PROCEDURE — 0W3P8ZZ CONTROL BLEEDING IN GASTROINTESTINAL TRACT, VIA NATURAL OR ARTIFICIAL OPENING ENDOSCOPIC: ICD-10-PCS | Performed by: INTERNAL MEDICINE

## 2019-06-17 PROCEDURE — 36415 COLL VENOUS BLD VENIPUNCTURE: CPT

## 2019-06-17 PROCEDURE — 77030031670 HC DEV INFL ENDOTEK BIG60 MRTM -B: Performed by: INTERNAL MEDICINE

## 2019-06-17 PROCEDURE — 0DB68ZX EXCISION OF STOMACH, VIA NATURAL OR ARTIFICIAL OPENING ENDOSCOPIC, DIAGNOSTIC: ICD-10-PCS | Performed by: INTERNAL MEDICINE

## 2019-06-17 RX ORDER — PANTOPRAZOLE SODIUM 20 MG/1
20 TABLET, DELAYED RELEASE ORAL
Qty: 30 TAB | Refills: 0 | Status: SHIPPED | OUTPATIENT
Start: 2019-06-18 | End: 2019-08-27 | Stop reason: SDUPTHER

## 2019-06-17 RX ORDER — DEXTROMETHORPHAN/PSEUDOEPHED 2.5-7.5/.8
1.2 DROPS ORAL
Status: CANCELLED | OUTPATIENT
Start: 2019-06-17

## 2019-06-17 RX ORDER — PANTOPRAZOLE SODIUM 20 MG/1
20 TABLET, DELAYED RELEASE ORAL
Qty: 30 TAB | Refills: 0 | Status: SHIPPED
Start: 2019-06-18 | End: 2019-06-17

## 2019-06-17 RX ORDER — PANTOPRAZOLE SODIUM 20 MG/1
20 TABLET, DELAYED RELEASE ORAL
Status: DISCONTINUED | OUTPATIENT
Start: 2019-06-18 | End: 2019-06-18 | Stop reason: HOSPADM

## 2019-06-17 RX ORDER — SODIUM CHLORIDE 9 MG/ML
25 INJECTION, SOLUTION INTRAVENOUS CONTINUOUS
Status: CANCELLED | OUTPATIENT
Start: 2019-06-17 | End: 2019-06-17

## 2019-06-17 RX ORDER — SODIUM CHLORIDE, SODIUM LACTATE, POTASSIUM CHLORIDE, CALCIUM CHLORIDE 600; 310; 30; 20 MG/100ML; MG/100ML; MG/100ML; MG/100ML
INJECTION, SOLUTION INTRAVENOUS
Status: DISCONTINUED | OUTPATIENT
Start: 2019-06-17 | End: 2019-06-17 | Stop reason: HOSPADM

## 2019-06-17 RX ORDER — SODIUM CHLORIDE 0.9 % (FLUSH) 0.9 %
5-40 SYRINGE (ML) INJECTION EVERY 8 HOURS
Status: CANCELLED | OUTPATIENT
Start: 2019-06-17

## 2019-06-17 RX ORDER — PROPOFOL 10 MG/ML
INJECTION, EMULSION INTRAVENOUS AS NEEDED
Status: DISCONTINUED | OUTPATIENT
Start: 2019-06-17 | End: 2019-06-17 | Stop reason: HOSPADM

## 2019-06-17 RX ORDER — SODIUM CHLORIDE 0.9 % (FLUSH) 0.9 %
5-40 SYRINGE (ML) INJECTION AS NEEDED
Status: CANCELLED | OUTPATIENT
Start: 2019-06-17

## 2019-06-17 RX ADMIN — IRON SUCROSE 300 MG: 20 INJECTION, SOLUTION INTRAVENOUS at 18:01

## 2019-06-17 RX ADMIN — PROPOFOL 20 MG: 10 INJECTION, EMULSION INTRAVENOUS at 15:22

## 2019-06-17 RX ADMIN — PROPOFOL 20 MG: 10 INJECTION, EMULSION INTRAVENOUS at 15:25

## 2019-06-17 RX ADMIN — PROPOFOL 20 MG: 10 INJECTION, EMULSION INTRAVENOUS at 15:31

## 2019-06-17 RX ADMIN — PROPOFOL 20 MG: 10 INJECTION, EMULSION INTRAVENOUS at 15:37

## 2019-06-17 RX ADMIN — PROPOFOL 20 MG: 10 INJECTION, EMULSION INTRAVENOUS at 15:41

## 2019-06-17 RX ADMIN — PROPOFOL 150 MG: 10 INJECTION, EMULSION INTRAVENOUS at 15:17

## 2019-06-17 RX ADMIN — POLYETHYLENE GLYCOL 3350, SODIUM SULFATE, SODIUM CHLORIDE, POTASSIUM CHLORIDE, ASCORBIC ACID, SODIUM ASCORBATE 1 L: KIT at 05:03

## 2019-06-17 RX ADMIN — SODIUM CHLORIDE, SODIUM LACTATE, POTASSIUM CHLORIDE, CALCIUM CHLORIDE: 600; 310; 30; 20 INJECTION, SOLUTION INTRAVENOUS at 15:10

## 2019-06-17 NOTE — PROCEDURES
Colonoscopy Report    Patient: Josh Bhat MRN: [id number]  SSN: [id number]    YOB: 1950  Age: 80 y.o. Sex: male      Date of Procedure: 6/17/2019    IMPRESSION:  1. Excellent prep---normal terminal ileum for 10 cm  2.  7 mm broad based polyp at 45 cm removed with cold snare  3. Minimal diverticulosis sigmoid colon  4. Small hemorrhoids in the anus    RECOMMENDATIONS:  1. Berry forte 1/day and continue until Ferritin is greater than 100 and Hgb normal  2. Call me in 2-3 weeks for polyp biopsy results if not received beforehand  3. If adenoma, repeat colonoscopy in 5 years unless villous--3 years  4. No NSAIDs--Tylenol/Acetominophen only  5. He will need Omeprazole 20 mg daily 30 mins before breakfast for 2-3 months to heal the gastric ulcer found on earlier EGD  6. He should call my office to schedule office visit for August    Indication:  Iron deficiency anemia--severe  History: The history and physical exam were reviewed and updated. Procedure Performed: Colonoscopy polypectomy (cold snare)  Endoscopist: Eduardo Cao MD  Assistant: Endoscopy Technician-1: Gulshan Segura CNA  Endoscopy RN-1: Kirill Pringle RN   ASA: ASA 2 - Patient with mild systemic disease with no functional limitations  Mallampati Score: II (soft palate, uvula, fauces visible)  Sedation:  MAC anesthesia  Endoscope: [id number]   Extent of Examination:terminal ileum  Quality of Preparation: excellent    Technique: The procedure was discussed with the patient including risks, benefits, alternatives including risks of IV sedation, bleeding, perforation and missed polyp. A safety timeout was performed. The patient was placed in left lateral position. The patient was given incremental doses of IV medication to achieve moderate  sedation. The patients vital signs were monitored at all times including heart rate, rhythm, blood pressure and oxygen saturation.   When adequate sedation was achieved a perianal inspection and a digital rectal exam were performed. The video colonoscope was introduced into the rectum and advanced under direct vision up to the cecum and 10 cm into the terminal ileum. The cecum was identified by IC valve, appendiceal orifice and convergence of the tineal folds. Careful examination of the colonic mucosa was then performed on slow withdrawal of the endoscope. Retroflexion was performed in the rectum and the distal rectum visualized as was the anal canal.  The patient tolerated the procedure very well and was transferred to recovery area. Findings:  See impression above  EBL: minimal  Specimen:   ID Type Source Tests Collected by Time Destination   1 : bx ulcer posterior wall distal fundus  Preservative Stomach, Fundus  Dandalides, Rojelio Bernheim, MD 6/17/2019 1533 Pathology   2 : (cold snare) polyp @ 45 cm colon  Preservative Colon  Dandalides, Rojelio Bernheim, MD 6/17/2019 1542 Pathology       Rojelio Bernheim.  Matt Epperson MD, MARIBEL Infante  June 17, 2019  3:56 PM

## 2019-06-17 NOTE — ROUTINE PROCESS
1915  Bedside and Verbal shift change report given to Landy (oncoming nurse) by Kimberly Calles (offgoing nurse). Report included the following information SBAR, Kardex, Intake/Output and MAR. Pt awake and alert, drinking bowel prep for procedure in AM.  Pt verbalized understanding of NPO at midnight    0715  Bedside and Verbal shift change report given to Joseluis (oncoming nurse) by Ilana Sanches (offgoing nurse). Report included the following information SBAR, Kardex, Intake/Output and MAR.

## 2019-06-17 NOTE — PROGRESS NOTES
Patient is unable to communicate at this time  As he is resting peacefully at present with the blankets pulled up to his nose. No family seen at this time.  offered prayer and left Spiritual Care brochure. Chaplains will continue to follow and will provide pastoral care on an as needed/requested basis.     Elizabeth Montiel   Spiritual Care   [phone]

## 2019-06-17 NOTE — ANESTHESIA POSTPROCEDURE EVALUATION
Procedure(s):  ESOPHAGOGASTRODUODENOSCOPY (EGD)  COLONOSCOPY. MAC    Anesthesia Post Evaluation      Multimodal analgesia: multimodal analgesia used between 6 hours prior to anesthesia start to PACU discharge  Patient location during evaluation: bedside  Patient participation: complete - patient participated  Level of consciousness: awake  Pain management: adequate  Airway patency: patent  Anesthetic complications: no  Cardiovascular status: stable  Respiratory status: acceptable  Hydration status: acceptable  Post anesthesia nausea and vomiting:  controlled      No vitals data found for the desired time range.

## 2019-06-17 NOTE — ROUTINE PROCESS
TRANSFER - OUT REPORT:    Verbal report given to BELGICA Tierney(name) on Tex Levi  being transferred to [facility](unit) for routine post - op       Report consisted of patients Situation, Background, Assessment and   Recommendations(SBAR). Information from the following report(s) SBAR was reviewed with the receiving nurse. Lines:   Peripheral IV 06/17/19 Posterior;Right Hand (Active)   Site Assessment Clean, dry, & intact 6/17/2019  4:26 PM   Phlebitis Assessment 0 6/17/2019  4:26 PM   Infiltration Assessment 0 6/17/2019  4:26 PM   Dressing Status Clean, dry, & intact 6/17/2019  4:26 PM   Dressing Type Tape;Transparent 6/17/2019  4:26 PM   Hub Color/Line Status Blue; Infusing 6/17/2019  4:26 PM   Action Taken Open ports on tubing capped 6/17/2019  3:00 PM   Alcohol Cap Used Yes 6/17/2019  3:00 PM        Opportunity for questions and clarification was provided.       Patient transported with:   Grandis

## 2019-06-17 NOTE — PROGRESS NOTES
[facility]     Patient was seen and examined this morning. He is awake alert oriented x3. On review of system he denies any fevers, chills, nausea vomiting or abdominal pain. No focal neurologic deficit. She has no more lightheadedness. She reports diarrhea this morning from the prep for colonoscopy. No bright red blood per rectum. No melena. ECOG performance status 1. Assessment/ Plan:     Patient Active Problem List   Diagnosis Code    Alcohol intoxication ([facility] [address] F10.929    Syncope R55    Anemia D64.9    Acute blood loss anemia D62     1. Acute blood loss anemia:  Patient with acute blood loss anemia. Clinically better with blood transfusion. H&H today at 8 AM was 8.2/28. 5. Dakota Carvajal *Complete Venofer IV iron x 4days  *EGD/Cscope today     2. Thrombocytosis: Reactive from iron deficiency.      3. Microcytosis: She has microcytic anemia from acute blood loss leading to iron deficiency. Complete IV iron infusion     4.  Elevated troponin: This is likely from troponin leak due to demand ischemia from his severe anemia.     Thank you I will follow. Patient to follow-up with me if discharge in 7 to 10 days in the outpatient clinic at [address] [place] [address]. 150, Gerald Credit      Thank you for the opportunity to participate in the care, please do not hesitate to call for any questions or concerns. I have discussed the diagnosis with the patient and the intended plan. The patient verbalized understanding and agrees with the plan.       History:   Erick Kaufman is a 80 y.o. male presenting today for Syncope      Current Facility-Administered Medications   Medication Dose Route Frequency Provider Last Rate Last Dose    iron sucrose (VENOFER) 300 mg in 0.9% sodium chloride 250 mL IVPB  300 mg IntraVENous Q24H Carol Pa .7 mL/hr at 06/16/19 1515 300 mg at 06/16/19 1515    zolpidem (AMBIEN) tablet 5 mg  5 mg Oral QHS PRN Miguel Lesch MD BJ        0.9% sodium chloride infusion 250 mL  250 mL IntraVENous PRN Sanjiv Hawkins MD   Stopped at 06/13/19 0655    0.9% sodium chloride infusion 250 mL  250 mL IntraVENous PRN Aloha Harinder, DO        0.9% sodium chloride infusion 250 mL  250 mL IntraVENous PRN Aloha Harinder, DO        0.9% sodium chloride infusion 250 mL  250 mL IntraVENous PRN Aloha Harinder, DO        0.9% sodium chloride infusion 250 mL  250 mL IntraVENous PRN Annye Lobe H, DO           Objective:   VS:    Visit Vitals  /56   Pulse 60   Temp 98.1 °F (36.7 °C)   Resp 18   Ht 5' 9.5\" (1.765 m)   Wt 89.4 kg (197 lb)   SpO2 95%   BMI 28.67 kg/m²        Physical Exam:     Constitutional:  well developed, well nourished, no acute distress and alert and oriented x 3    HENT:  Oral mucosa pink and moist, no cyanosis observed and no pallor observed.    Eyes:  conjunctiva normal, upper and lower eyelid normal bilaterally.    Neck:  No jugular venous distension present.    Cardiovascular:  heart sounds normal, normal rate and regular rhythm, no murmurs or rubs, no thrills, no S3 or S4   Pulmonary/Chest Wall:  breath sounds are clear bilaterally and no use of accessory muscles in breathing.    Abdominal:  Non tender, no palpable masses, no hepatosplenomegaly, and no abdominal bruits.    Genitourinary/Anorectal:  Not examined   Musculoskeletal:  No digital clubbing or cyanosis. Normal muscle strength and tone.    Skin:  Normal and no rashes or lesions    Peripheral Vascular:  No edema present in extremities.        Review of Systems  As per HPI    Recent Results (from the past 168 hour(s))   CBC WITH AUTOMATED DIFF    Collection Time: 06/13/19  1:40 AM   Result Value Ref Range    WBC 8.0 4.6 - 13.2 K/uL    RBC 1.77 (L) 4.70 - 5.50 M/uL    HGB 3.6 (LL) 13.0 - 16.0 g/dL    HCT 13.0 (LL) 36.0 - 48.0 %    MCV 73.4 (L) 74.0 - 97.0 FL    MCH 20.3 (L) 24.0 - 34.0 PG    MCHC 27.7 (L) 31.0 - 37.0 g/dL    RDW 29.7 (H) 11.6 - 14.5 %    PLATELET 076 (H) 875 - 420 K/uL    MPV 9.5 9.2 - 11.8 FL    NEUTROPHILS 55 42 - 75 %    LYMPHOCYTES 28 20 - 51 %    MONOCYTES 17 (H) 2 - 9 %    EOSINOPHILS 0 0 - 5 %    BASOPHILS 0 0 - 3 %    ABS. NEUTROPHILS 4.4 1.8 - 8.0 K/UL    ABS. LYMPHOCYTES 2.2 0.8 - 3.5 K/UL    ABS. MONOCYTES 1.4 (H) 0 - 1.0 K/UL    ABS. EOSINOPHILS 0.0 0.0 - 0.4 K/UL    ABS.  BASOPHILS 0.0 0.0 - 0.1 K/UL    PLATELET COMMENTS Increased Platelets      RBC COMMENTS ANISOCYTOSIS  3+        RBC COMMENTS HYPOCHROMIA  3+        RBC COMMENTS OVALOCYTES  1+        DF MANUAL     METABOLIC PANEL, BASIC    Collection Time: 06/13/19  1:40 AM   Result Value Ref Range    Sodium 136 136 - 145 mmol/L    Potassium 3.7 3.5 - 5.5 mmol/L    Chloride 104 100 - 108 mmol/L    CO2 20 (L) 21 - 32 mmol/L    Anion gap 12 3.0 - 18 mmol/L    Glucose 113 (H) 74 - 99 mg/dL    BUN 8 7.0 - 18 MG/DL    Creatinine 1.01 0.6 - 1.3 MG/DL    BUN/Creatinine ratio 8 (L) 12 - 20      GFR est AA >60 >60 ml/min/1.73m2    GFR est non-AA >60 >60 ml/min/1.73m2    Calcium 8.5 8.5 - 10.1 MG/DL   CARDIAC PANEL,(CK, CKMB & TROPONIN)    Collection Time: 06/13/19  1:40 AM   Result Value Ref Range     39 - 308 U/L    CK - MB 1.0 <3.6 ng/ml    CK-MB Index 0.5 0.0 - 4.0 %    Troponin-I, QT 0.25 (H) 0.0 - 0.045 NG/ML   EKG, 12 LEAD, SUBSEQUENT    Collection Time: 06/13/19  2:11 AM   Result Value Ref Range    Ventricular Rate 86 BPM    Atrial Rate 86 BPM    P-R Interval 150 ms    QRS Duration 90 ms    Q-T Interval 378 ms    QTC Calculation (Bezet) 452 ms    Calculated P Axis 71 degrees    Calculated R Axis 58 degrees    Calculated T Axis -51 degrees    Diagnosis       Normal sinus rhythm  ST & T wave abnormality, consider inferolateral ischemia  Abnormal ECG  When compared with ECG of 16-APR-2019 09:41,  Nonspecific ST and T wave abnormality , increase of  Confirmed by Migue Webber ([id number]) on 6/13/2019 10:50:39 AM     TYPE + CROSSMATCH    Collection Time: 06/13/19  2:43 AM Result Value Ref Range    Crossmatch Expiration 06/16/2019     ABO/Rh(D) O POSITIVE     Antibody screen NEG     CALLED TO: [facility] ED ON 06/13/2019 AT 0424 BY [facility]     CALLED TO: Sherwin Mitchell, ED, ON 6/13/19, , BY [facility]     CALLED TO: Sherwin Mitchell, DMER,  [address] ON 6/13/19 BY [id number] [employer]     Unit number [id number]     Blood component type RC LR     Unit division 00     Status of unit TRANSFUSED     Crossmatch result Compatible     Unit number [id number]     Blood component type RC LR,2     Unit division 00     Status of unit TRANSFUSED     Crossmatch result Compatible     Unit number [id number]     Blood component type RC LR,2     Unit division 00     Status of unit TRANSFUSED     Crossmatch result Compatible     Unit number [id number]     Blood component type RC LR,2     Unit division 00     Status of unit TRANSFUSED     Crossmatch result Compatible    HGB & HCT    Collection Time: 06/13/19  7:35 AM   Result Value Ref Range    HGB 4.1 (LL) 13.0 - 16.0 g/dL    HCT 14.7 (LL) 36.0 - 48.0 %   ECHO ADULT COMPLETE    Collection Time: 06/13/19 11:21 AM   Result Value Ref Range    Aortic Valve Systolic Peak Velocity 3.41 cm/s    AoV PG 0.0 mmHg    LVIDd 6.10 (A) 4.2 - 5.9 cm    LVPWd 0.76 0.6 - 1.0 cm    LVIDs 4.82 cm    IVSd 0.98 0.6 - 1.0 cm    LV ED Vol A2C 112.1 mL    LV ES Vol A4C 90.0 mL    LV ES Vol BP 83.0 (A) 22 - 58 mL    LVOT d 1.97 cm    LVOT Peak Velocity 98.76 cm/s    LVOT Peak Gradient 3.9 mmHg    MVA (PHT) 5.5 cm2    MV A Fabian 64.22 cm/s    MV E Fabian 98.71 cm/s    MV E/A 1.54     BP EF 42.3 (A) 55 - 100 %    LV Ejection Fraction MOD 4C 48 %    LV Ejection Fraction MOD 2C 32 %    Inferior Vena Cava Diameter Sniffing 1.80 cm    LA Vol 4C 88.23 (A) 18 - 58 mL    LA Vol 2C 100.31 (A) 18 - 58 mL    LV Mass .2 (A) 88 - 224 g    LV Mass AL Index 121.8 49 - 115 g/m2    RVSP 19.8 mmHg    LV ES Vol A2C 75.8 mL    LVES Vol Index BP 40.2 mL/m2    LV ED Vol A4C 174.3 mL    LVED Vol Index BP 69.6 mL/m2    Est. RA Pressure 10.0 mmHg    Mitral Valve E Wave Deceleration Time 136.8 ms    Mitral Valve Pressure Half-time 39.7 ms    Triscuspid Valve Regurgitation Peak Gradient 9.8 mmHg    LV ED Vol .7 67 - 155 ml    TR Max Velocity -156.18 cm/s    PASP 19.8 mmHg    LA Vol Index 48.62 16 - 28 ml/m2    LA Vol Index 42.76 16 - 28 ml/m2    LVED Vol Index A4C 84.5 mL/m2    LVED Vol Index A2C 54.3 mL/m2    LVES Vol Index A4C 43.6 mL/m2    LVES Vol Index A2C 36.7 mL/m2    Ao Root D 2.89 cm   HGB & HCT    Collection Time: 06/13/19  3:20 PM   Result Value Ref Range    HGB 6.1 (L) 13.0 - 16.0 g/dL    HCT 20.9 (L) 36.0 - 48.0 %   CBC WITH AUTOMATED DIFF    Collection Time: 06/14/19  1:50 AM   Result Value Ref Range    WBC 7.3 4.6 - 13.2 K/uL    RBC 2.95 (L) 4.70 - 5.50 M/uL    HGB 7.1 (L) 13.0 - 16.0 g/dL    HCT 22.7 (L) 36.0 - 48.0 %    MCV 76.9 74.0 - 97.0 FL    MCH 24.1 24.0 - 34.0 PG    MCHC 31.3 31.0 - 37.0 g/dL    RDW 23.1 (H) 11.6 - 14.5 %    PLATELET 234 (H) 856 - 420 K/uL    MPV 9.5 9.2 - 11.8 FL    NEUTROPHILS 58 40 - 73 %    LYMPHOCYTES 13 (L) 21 - 52 %    MONOCYTES 27 (H) 3 - 10 %    EOSINOPHILS 1 0 - 5 %    BASOPHILS 1 0 - 2 %    ABS. NEUTROPHILS 4.3 1.8 - 8.0 K/UL    ABS. LYMPHOCYTES 1.0 0.9 - 3.6 K/UL    ABS. MONOCYTES 1.9 (H) 0.05 - 1.2 K/UL    ABS. EOSINOPHILS 0.1 0.0 - 0.4 K/UL    ABS.  BASOPHILS 0.0 0.0 - 0.1 K/UL    DF AUTOMATED     HGB & HCT    Collection Time: 06/14/19  8:05 AM   Result Value Ref Range    HGB 6.8 (L) 13.0 - 16.0 g/dL    HCT 22.7 (L) 36.0 - 48.0 %   HAPTOGLOBIN    Collection Time: 06/14/19  1:28 PM   Result Value Ref Range    Haptoglobin 189 30 - 200 mg/dL   LD    Collection Time: 06/14/19  1:28 PM   Result Value Ref Range     (H) 81 - 234 U/L   DIRECT NATHAN    Collection Time: 06/14/19  1:28 PM   Result Value Ref Range    LSEVIA Poly NEG     LESVIA IgG NEG     LESVIA C3b/C3d NEG    FERRITIN    Collection Time: 06/14/19  1:28 PM   Result Value Ref Range    Ferritin 9 8 - 388 NG/ML   IRON PROFILE Collection Time: 06/14/19  1:28 PM   Result Value Ref Range    Iron 14 (L) 50 - 175 ug/dL    TIBC 404 250 - 450 ug/dL    Iron % saturation 3 %   VITAMIN B12 & FOLATE    Collection Time: 06/14/19  1:28 PM   Result Value Ref Range    Vitamin B12 620 211 - 911 pg/mL    Folate 12.2 3.10 - 17.50 ng/mL   HGB & HCT    Collection Time: 06/14/19  1:28 PM   Result Value Ref Range    HGB 6.9 (L) 13.0 - 16.0 g/dL    HCT 22.7 (L) 36.0 - 48.0 %   CBC WITH MANUAL DIFF    Collection Time: 06/14/19  1:28 PM   Result Value Ref Range    WBC 7.4 4.6 - 13.2 K/uL    RBC 2.91 (L) 4.70 - 5.50 M/uL    HGB 6.8 (L) 13.0 - 16.0 g/dL    HCT 22.9 (L) 36.0 - 48.0 %    MCV 78.7 74.0 - 97.0 FL    MCH 23.4 (L) 24.0 - 34.0 PG    MCHC 29.7 (L) 31.0 - 37.0 g/dL    RDW 23.4 (H) 11.6 - 14.5 %    PLATELET 435 (H) 773 - 420 K/uL    MPV 9.0 (L) 9.2 - 11.8 FL    NEUTROPHILS 68 42 - 75 %    BAND NEUTROPHILS 0 0 - 5 %    LYMPHOCYTES 15 (L) 20 - 51 %    MONOCYTES 15 (H) 2 - 9 %    EOSINOPHILS 2 0 - 5 %    BASOPHILS 0 0 - 3 %    METAMYELOCYTES 0 0 %    MYELOCYTES 0 0 %    PROMYELOCYTES 0 0 %    BLASTS 0 0 %    PLATELET COMMENTS COUPLE CLUMPS NOTED ON SLIDE     RBC COMMENTS ANISOCYTOSIS  1+        RBC COMMENTS MICROCYTOSIS  2+        RBC COMMENTS HYPOCHROMIA  1+        DF MANUAL      ABS. NEUTROPHILS 5.1 1.8 - 8.0 K/UL    ABS. LYMPHOCYTES 1.1 0.8 - 3.5 K/UL    ABS. MONOCYTES 1.1 (H) 0 - 1.0 K/UL    ABS. EOSINOPHILS 0.1 0.0 - 0.4 K/UL    ABS.  BASOPHILS 0.0 0.0 - 0.1 K/UL    DIFFERENTIAL MANUAL DIFFERENTIAL ORDERED     HGB & HCT    Collection Time: 06/14/19  8:15 PM   Result Value Ref Range    HGB 7.3 (L) 13.0 - 16.0 g/dL    HCT 24.7 (L) 36.0 - 48.0 %   HGB & HCT    Collection Time: 06/15/19  4:05 AM   Result Value Ref Range    HGB 6.9 (L) 13.0 - 16.0 g/dL    HCT 23.3 (L) 36.0 - 48.0 %   HGB & HCT    Collection Time: 06/15/19 10:30 AM   Result Value Ref Range    HGB 7.2 (L) 13.0 - 16.0 g/dL    HCT 23.8 (L) 36.0 - 48.0 %   AFP, TUMOR MARKER    Collection Time: 06/15/19 10:30 AM   Result Value Ref Range    AFP, Serum, Tumor Marker 2.5 0.0 - 8.3 ng/mL   METABOLIC PANEL, COMPREHENSIVE    Collection Time: 06/15/19 10:30 AM   Result Value Ref Range    Sodium 141 136 - 145 mmol/L    Potassium 3.6 3.5 - 5.5 mmol/L    Chloride 110 (H) 100 - 108 mmol/L    CO2 26 21 - 32 mmol/L    Anion gap 5 3.0 - 18 mmol/L    Glucose 109 (H) 74 - 99 mg/dL    BUN 5 (L) 7.0 - 18 MG/DL    Creatinine 0.83 0.6 - 1.3 MG/DL    BUN/Creatinine ratio 6 (L) 12 - 20      GFR est AA >60 >60 ml/min/1.73m2    GFR est non-AA >60 >60 ml/min/1.73m2    Calcium 8.3 (L) 8.5 - 10.1 MG/DL    Bilirubin, total 0.5 0.2 - 1.0 MG/DL    ALT (SGPT) 22 16 - 61 U/L    AST (SGOT) 35 15 - 37 U/L    Alk. phosphatase 65 45 - 117 U/L    Protein, total 7.0 6.4 - 8.2 g/dL    Albumin 2.9 (L) 3.4 - 5.0 g/dL    Globulin 4.1 (H) 2.0 - 4.0 g/dL    A-G Ratio 0.7 (L) 0.8 - 1.7     HGB & HCT    Collection Time: 06/15/19  2:00 PM   Result Value Ref Range    HGB 7.6 (L) 13.0 - 16.0 g/dL    HCT 26.2 (L) 36.0 - 48.0 %   HGB & HCT    Collection Time: 06/15/19  8:47 PM   Result Value Ref Range    HGB 7.6 (L) 13.0 - 16.0 g/dL    HCT 25.5 (L) 36.0 - 48.0 %   HGB & HCT    Collection Time: 06/16/19  3:46 AM   Result Value Ref Range    HGB 7.5 (L) 13.0 - 16.0 g/dL    HCT 25.7 (L) 36.0 - 48.0 %   HGB & HCT    Collection Time: 06/16/19  7:50 AM   Result Value Ref Range    HGB 7.2 (L) 13.0 - 16.0 g/dL    HCT 25.1 (L) 36.0 - 48.0 %   HGB & HCT    Collection Time: 06/16/19  9:30 PM   Result Value Ref Range    HGB 8.2 (L) 13.0 - 16.0 g/dL    HCT 27.7 (L) 36.0 - 48.0 %   HGB & HCT    Collection Time: 06/17/19  8:00 AM   Result Value Ref Range    HGB 8.2 (L) 13.0 - 16.0 g/dL    HCT 28.5 (L) 36.0 - 48.0 %       Past Medical History:   Diagnosis Date    Ill-defined condition     Pt passes out  does not know why. happened 15 times gonzalez. Last time ; Aug 2018       Past Surgical History:   Procedure Laterality Date    HX CATARACT REMOVAL Left 01/24/2018 Social History     Socioeconomic History    Marital status:      Spouse name: Not on file    Number of children: Not on file    Years of education: Not on file    Highest education level: Not on file   Occupational History    Not on file   Social Needs    Financial resource strain: Not on file    Food insecurity:     Worry: Not on file     Inability: Not on file    Transportation needs:     Medical: Not on file     Non-medical: Not on file   Tobacco Use    Smoking status: Current Every Day Smoker     Packs/day: 0.50     Years: 45.00     Pack years: 22.50    Smokeless tobacco: Never Used   Substance and Sexual Activity    Alcohol use: Yes     Alcohol/week: 16.8 oz     Types: 28 Cans of beer per week     Comment: 3-4 16 oz beers daily    Drug use: No    Sexual activity: Not on file   Lifestyle    Physical activity:     Days per week: Not on file     Minutes per session: Not on file    Stress: Not on file   Relationships    Social connections:     Talks on phone: Not on file     Gets together: Not on file     Attends [employer] service: Not on file     Active member of club or organization: Not on file     Attends meetings of clubs or organizations: Not on file     Relationship status: Not on file    Intimate partner violence:     Fear of current or ex partner: Not on file     Emotionally abused: Not on file     Physically abused: Not on file     Forced sexual activity: Not on file   Other Topics Concern    Not on file   Social History Narrative    Not on file       History reviewed. No pertinent family history.     No Known Allergies        Daniel Lara MD

## 2019-06-17 NOTE — ANESTHESIA PREPROCEDURE EVALUATION
Anesthetic History               Review of Systems / Medical History  Patient summary reviewed and pertinent labs reviewed    Pulmonary          Smoker         Neuro/Psych   Within defined limits           Cardiovascular  Within defined limits                Exercise tolerance: >4 METS  Comments: Alcohol dependence   GI/Hepatic/Renal  Within defined limits              Endo/Other  Within defined limits           Other Findings   Comments:                  Physical Exam    Airway  Mallampati: II  TM Distance: 4 - 6 cm  Neck ROM: normal range of motion   Mouth opening: Normal     Cardiovascular  Regular rate and rhythm,  S1 and S2 normal,  no murmur, click, rub, or gallop  Rhythm: regular  Rate: normal         Dental    Dentition: Poor dentition  Comments: The patient has very poor dentition. Loose, broken, and or missing teeth. I explained the risk of dental damage and or loss. The patient understands and accepts these risks.      Pulmonary  Breath sounds clear to auscultation               Abdominal  GI exam deferred       Other Findings            Anesthetic Plan    ASA: 3  Anesthesia type: MAC          Induction: Intravenous  Anesthetic plan and risks discussed with: Patient

## 2019-06-17 NOTE — PROCEDURES
Endoscopy Procedure Note    Patient: Nasim Bundy MRN: [id number]  SSN: [id number]    YOB: 1950  Age: 80 y.o. Sex: male      Date/Time:  6/17/2019 4:02 PM    Esophagogastroduodenoscopy (EGD)/Push Enteroscopy Procedure Report    Procedure: Esophagogastroduodenoscopy/Push Enteroscopy with biopsy, control of bleeding    IMPRESSION:   1. Several tiny AVMs  In the duodenal 4th and 3rd portions cauterized with bicap probe  2.  2 cm oval, flat, white based ulcer posterior, greater curve aspect of the distal fundus---bx'd to r/o H Pylori or atypical neoplasm  3. Somewhat prominent submucosal veins in the distal 5 -7 cm of the esophagus---flat varices but no gastric varices or portal hypertensive gatropathy noted    RECOMMENDATIONS:  1. No NSAIDs--take Tylenol only for aches and pains  2. Start [employer] and continue until Ferritin greater than 100 and Hgb normal  3. Call my office and scheduled office appt for August 4. Call me in 2-3 weeks for gastric biopsy results if not received beforehand---- treat H Pylori if present    Indication: severe Iron deficiency anemia  :  Manjeet Sifuentes MD  Referring Provider:   Other, MD Ludwin  Assistants: Endoscopy Technician-1: Rony Yoder CNA  Endoscopy RN-1: Lexx Dong RN  History: The history and physical exam were reviewed and updated. Endoscope: CFP [id number]  ASA: ASA 2 - Patient with mild systemic disease with no functional limitations  Mallampati: II (soft palate, uvula, fauces visible)  Sedation:  MAC anesthesia    Description of the procedure: The procedure was discussed with the patient including risks, benefits, alternatives including risks of iv sedation, bleeding, perforation and aspiration. A safety timeout was performed. The patient was placed in the left lateral decubitus position. A bite block was placed. The patient was given incremental doses of intravenous medication until moderate sedation was achieved.   The patients vital signs were monitored at all times including heart rate/rhythm, blood pressure and oxygen saturation. The endoscope was then passed under direct visualization into the esophagus, across the GE junction into the stomach and through the pylorus into the duodenal bulb and second, ,third, fourth portion and proximal jejunum. The endoscope was then slowly withdrawn while visualizing the mucosa. In the stomach a retroflexion was performed and gastric fundus and cardia visualized. .The endoscope was then slowly withdrawn. The patient was then transferred to recovery in stable condition. Findings: see impression above    Complications:   none    EBL: minimal    Discharge disposition:  Back to hospital room when criteria met    Helen Contreras.  MD Funmi, MARIBEL Quan  June 17, 2019  4:02 PM

## 2019-06-17 NOTE — PROGRESS NOTES
Problem: Falls - Risk of  Goal: *Absence of Falls  Description  Document Corwin Byrd Fall Risk and appropriate interventions in the flowsheet.   Outcome: Progressing Towards Goal     Problem: Patient Education: Go to Patient Education Activity  Goal: Patient/Family Education  Outcome: Progressing Towards Goal     Problem: Anemia Care Plan (Adult and Pediatric)  Goal: *Labs within defined limits  Outcome: Progressing Towards Goal  Goal: *Tolerates increased activity  Outcome: Progressing Towards Goal     Problem: Patient Education: Go to Patient Education Activity  Goal: Patient/Family Education  Outcome: Progressing Towards Goal     Problem: Discharge Planning  Goal: *Discharge to safe environment  Outcome: Progressing Towards Goal     Problem: Pain  Goal: *Control of Pain  Outcome: Progressing Towards Goal     Problem: Patient Education: Go to Patient Education Activity  Goal: Patient/Family Education  Outcome: Progressing Towards Goal

## 2019-06-17 NOTE — DISCHARGE SUMMARY
[facility]  Hospitalist Division    Discharge Summary    Patient: Josh Bhat MRN: [id number]  CSN: [id number]    YOB: 1950  Age: 80 y.o. 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 o. male who presented to the ER after experiencing syncope at home. [facility] was eating ice and put the cup down on the table and reports, \"then I passed out and fell down and hit my head on the leg of the table. \" [facility] reports that he has had severe anemia in the past but that he has not had a cause of his anemia identified. [facility] has had syncope multiple times in the past. 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 \"  The injury occurred when he fell down 14 steps and hit his head.   He does not have chest pain or SOB.  He does not have GI bleeding symptoms.  In the ER he is found to have a hemoglobin of 3.6. [facility] is getting blood transfusion and will be admitted for ongoing management. \"     Patient had a similar episode in April 2019, was given 2-3 units PRBCs in ED and sent home. He denies hematochezia, melena. Hematology consulted - suspicious of GIB, recommends GI consult for upper/lower endoscopy, as well as lab w/u to evaluate other potential causes of anemia. Procedure reports below. AVMs seen in duodenum, gastric ulcer was bx'd, polyp removed from colon, minimal diverticulosis seen in sigmoid. GI recs Berry forte daily until Ferritin greater than 100 and hgb normal, no NSAIDs, use tylenol for pain, omeprazole daily. VS and labs stable for discharge. Physical Exam:  General Appearance: NAD, conversant  HENT: normocephalic/atraumatic, moist mucus membranes  Lungs: CTA with normal respiratory effort  CV: RRR, no m/r/g  Abdomen: soft, non-tender, normal bowel sounds  Extremities: no cyanosis, no peripheral edema  Skin: Normal color, intact    Significant Diagnostic Studies:     CBC:   Lab Results   Component Value Date/Time    HGB 8.2 (L) 06/17/2019 08:00 AM    HCT 28.5 (L) 06/17/2019 08:00 AM       Echo 6/13/19  Interpretation Summary      · Left Ventricle: Mildly dilated left ventricle. Mild systolic dysfunction. Estimated left ventricular ejection fraction is 46 - 50%. Visually measured ejection fraction. Left ventricular global hypokinesis. Severe (grade 3) left ventricular diastolic dysfunction. · Left Atrium: Mildly dilated left atrium. · Aortic Valve: Mild aortic valve sclerosis with no evidence of reduced excursion. · Mitral Valve: Mitral valve non-specific thickening. Trace mitral valve regurgitation. · IVC/Hepatic Veins: Mildly elevated central venous pressure (5-10 mmHg); IVC diameter is less than 21 mm and collapses less than 50% with respiration.      Echo Findings      Left Ventricle Normal wall thickness. Mildly dilated left ventricle. Mild systolic dysfunction. The estimated ejection fraction is 46 - 50%. Visually measured ejection fraction. Global hypokinesis observed.  There is severe (grade 3) left ventricular diastolic dysfunction. Left Atrium Mildly dilated left atrium. Right Ventricle Normal cavity size and global systolic function. Right Atrium Normal cavity size. Interatrial Septum Saline contrast not used. Aortic Valve No stenosis and no regurgitation. Mild aortic valve sclerosis with no evidence of reduced excursion. Mitral Valve No stenosis. Mitral valve non-specific thickening. w/o reduced excursion. Trace regurgitation. Tricuspid Valve Normal valve structure and no stenosis. Tricuspid regurgitation is inadequate for estimation of right ventricular systolic pressure. Pulmonary arterial systolic pressure is 64.3 mmHg. Pulmonic Valve Pulmonic valve normal doppler findings. Aorta Normal aortic root, ascending aortic, and aortic arch. Pulmonary Artery Pulmonary arteries not well visualized. IVC/Hepatic Veins Normal structure. Mildly elevated central venous pressure (5-10 mmHg); IVC diameter is less than 21 mm and collapses less than 50% with respiration. Pericardium Normal pericardium and no evidence of pericardial effusion. Colonoscopy Report    IMPRESSION:  1. Excellent prep---normal terminal ileum for 10 cm  2.  7 mm broad based polyp at 45 cm removed with cold snare  3. Minimal diverticulosis sigmoid colon  4. Small hemorrhoids in the anus     RECOMMENDATIONS:  1. Berry forte 1/day and continue until Ferritin is greater than 100 and Hgb normal  2. Call me in 2-3 weeks for polyp biopsy results if not received beforehand  3. If adenoma, repeat colonoscopy in 5 years unless villous--3 years  4. No NSAIDs--Tylenol/Acetominophen only  5. He will need Omeprazole 20 mg daily 30 mins before breakfast for 2-3 months to heal the gastric ulcer found on earlier EGD  6.  He should call my office to schedule office visit for August       Esophagogastroduodenoscopy (EGD)/Push Enteroscopy Procedure Report     Procedure: Esophagogastroduodenoscopy/Push Enteroscopy with biopsy, control of bleeding     IMPRESSION:   1. Several tiny AVMs  In the duodenal 4th and 3rd portions cauterized with bicap probe  2.  2 cm oval, flat, white based ulcer posterior, greater curve aspect of the distal fundus---bx'd to r/o H Pylori or atypical neoplasm  3. Somewhat prominent submucosal veins in the distal 5 -7 cm of the esophagus---flat varices but no gastric varices or portal hypertensive gatropathy noted     RECOMMENDATIONS:  1. No NSAIDs--take Tylenol only for aches and pains  2. Start [employer] and continue until Ferritin greater than 100 and Hgb normal  3. Call my office and scheduled office appt for August  4. Call me in 2-3 weeks for gastric biopsy results if not received beforehand---- treat H Pylori if present    GI physician: Bobby Otoole MD        Discharge Medications:     Current Discharge Medication List      START taking these medications    Details   pantoprazole (PROTONIX) 20 mg tablet Take 1 Tab by mouth Daily (before breakfast). Qty: 30 Tab, Refills: 0      Iron BisGl &PS Aet-D-G95-FA-Ca (ENZO FORTE) 163-25-24-4 mg-mg-mcg-mg cap Take 1 Cap by mouth daily.   Qty: 30 Cap, Refills: 0             Activity: Activity as tolerated    Diet: Cardiac Diet    Wound Care: None needed    Follow-up:   PCP in 1 week  Call GI office and schedule office appt for August, call GI in 2-3 weeks for gastric biopsy results if not received beforehand  Heme/onc (Thierno) in 7-10 days    Discharge time: >35 minutes    [facility]bs[facility]  Office:  [phone]  Pager: [phone]      6/17/2019, 5:04 PM

## 2019-06-17 NOTE — ROUTINE PROCESS
TRANSFER - IN REPORT:    Verbal report received from BELGICA Tierney (name) on Ez Torres  being received from 3000(unit) for routine post - op      Report consisted of patients Situation, Background, Assessment and   Recommendations(SBAR). Information from the following report(s) SBAR was reviewed with the receiving nurse. Opportunity for questions and clarification was provided. Assessment completed upon patients arrival to unit and care assumed.

## 2019-06-17 NOTE — PROGRESS NOTES
Problem: Discharge Planning  Goal: *Discharge to safe environment  Outcome: Progressing Towards Goal   Plan is to TBD      Chart reviewed. Pt off floor for EGD/colonoscopy. Plan remains likely home with home health, possible personal care.

## 2019-06-17 NOTE — PROGRESS NOTES
5319: Bedside shift change report given to Joseluis RN (oncoming nurse) by Oralia Morrison RN (offgoing nurse). Report included the following information SBAR, Kardex, Procedure Summary, Intake/Output, MAR and Cardiac Rhythm SR.     1050: Went into patient room for hourly rounding. Found a Cup of Ice next to bed, clarified with ENDO about patient's NPO status. Removed Ice Chips and educated patient about NPO status. As well as nursing student and CNA. 1630: Maximino Essex from post Endo call to get update on patient. They stated that in the EGD, large healing ulcer. Colonoscopy, small intestines some ABM that were cauterized, in the colon one polyp. [id number]: Patient back on floor, A/Ox4, No complaints of pain. Spoke with Kirk DOSHI about Iron infusion, she stated to go ahead and do infusion. 1920: Bedside shift change report given to [address] [place] [address] (oncoming nurse) by Reshma Giang RN (offgoing nurse). Report included the following information SBAR, Kardex, Procedure Summary, Intake/Output, MAR and Cardiac Rhythm SR. Let Nikki Garza know that is to be discharged, just waiting on iron Sucrose infusion to be complete. Patient's belongings at bedside. Discharge paperwork as well.

## 2019-06-17 NOTE — PROGRESS NOTES
Problem: Falls - Risk of  Goal: *Absence of Falls  Description  Document Bishop Hewitt Fall Risk and appropriate interventions in the flowsheet.   6/17/2019 1947 by Dominic Christianson RN  Outcome: Resolved/Met  6/17/2019 1101 by Dominic Christianson RN  Outcome: Progressing Towards Goal     Problem: Patient Education: Go to Patient Education Activity  Goal: Patient/Family Education  6/17/2019 1947 by Dominic Christianson RN  Outcome: Resolved/Met  6/17/2019 1101 by Dominic Christianson RN  Outcome: Progressing Towards Goal     Problem: Anemia Care Plan (Adult and Pediatric)  Goal: *Labs within defined limits  6/17/2019 1947 by Dominic Christianson RN  Outcome: Resolved/Met  6/17/2019 1101 by Dominic Christianson RN  Outcome: Progressing Towards Goal  Goal: *Tolerates increased activity  6/17/2019 1947 by Dominic Christianson RN  Outcome: Resolved/Met  6/17/2019 1101 by Dominic Christianson RN  Outcome: Progressing Towards Goal     Problem: Patient Education: Go to Patient Education Activity  Goal: Patient/Family Education  6/17/2019 1947 by Dominic Christianson RN  Outcome: Resolved/Met  6/17/2019 1101 by Dominic Christianson RN  Outcome: Progressing Towards Goal     Problem: Discharge Planning  Goal: *Discharge to safe environment  6/17/2019 1947 by Dominic Christianson RN  Outcome: Resolved/Met  6/17/2019 1101 by Dominic Christianson RN  Outcome: Progressing Towards Goal     Problem: Pain  Goal: *Control of Pain  6/17/2019 1947 by Dominic Christianson RN  Outcome: Resolved/Met  6/17/2019 1101 by Dominic Christianson RN  Outcome: Progressing Towards Goal     Problem: Patient Education: Go to Patient Education Activity  Goal: Patient/Family Education  6/17/2019 1947 by Dominic Christianson RN  Outcome: Resolved/Met  6/17/2019 1101 by Dominic Christianson RN  Outcome: Progressing Towards Goal

## 2019-06-17 NOTE — DISCHARGE INSTRUCTIONS
DISCHARGE SUMMARY from Nurse    PATIENT INSTRUCTIONS:    After general anesthesia or intravenous sedation, for 24 hours or while taking prescription Narcotics:  · Limit your activities  · Do not drive and operate hazardous machinery  · Do not make important personal or business decisions  · Do  not drink alcoholic beverages  · If you have not urinated within 8 hours after discharge, please contact your surgeon on call. Report the following to your surgeon:  · Excessive pain, swelling, redness or odor of or around the surgical area  · Temperature over 100.5  · Nausea and vomiting lasting longer than 4 hours or if unable to take medications  · Any signs of decreased circulation or nerve impairment to extremity: change in color, persistent  numbness, tingling, coldness or increase pain  · Any questions    What to do at Home:  Recommended activity: Activity as tolerated    If you experience any of the following symptoms uncontrolled pain or black/tarry stools, please follow up with primary care physician/GI doctor. *  Please give a list of your current medications to your Primary Care Provider. *  Please update this list whenever your medications are discontinued, doses are      changed, or new medications (including over-the-counter products) are added. *  Please carry medication information at all times in case of emergency situations. These are general instructions for a healthy lifestyle:    No smoking/ No tobacco products/ Avoid exposure to second hand smoke  Surgeon General's Warning:  Quitting smoking now greatly reduces serious risk to your health.     Obesity, smoking, and sedentary lifestyle greatly increases your risk for illness    A healthy diet, regular physical exercise & weight monitoring are important for maintaining a healthy lifestyle    You may be retaining fluid if you have a history of heart failure or if you experience any of the following symptoms:  Weight gain of 3 pounds or more overnight or 5 pounds in a week, increased swelling in our hands or feet or shortness of breath while lying flat in bed. Please call your doctor as soon as you notice any of these symptoms; do not wait until your next office visit. Recognize signs and symptoms of STROKE:    F-face looks uneven    A-arms unable to move or move unevenly    S-speech slurred or non-existent    T-time-call 911 as soon as signs and symptoms begin-DO NOT go       Back to bed or wait to see if you get better-TIME IS BRAIN. Warning Signs of HEART ATTACK     Call 911 if you have these symptoms:   Chest discomfort. Most heart attacks involve discomfort in the center of the chest that lasts more than a few minutes, or that goes away and comes back. It can feel like uncomfortable pressure, squeezing, fullness, or pain.  Discomfort in other areas of the upper body. Symptoms can include pain or discomfort in one or both arms, the back, neck, jaw, or stomach.  Shortness of breath with or without chest discomfort.  Other signs may include breaking out in a cold sweat, nausea, or lightheadedness. Don't wait more than five minutes to call 911 - MINUTES MATTER! Fast action can save your life. Calling 911 is almost always the fastest way to get lifesaving treatment. Emergency Medical Services staff can begin treatment when they arrive -- up to an hour sooner than if someone gets to the hospital by car. The discharge information has been reviewed with the patient. The patient verbalized understanding. Discharge medications reviewed with the patient and appropriate educational materials and side effects teaching were provided.     Patient armband removed and shredded

## 2019-06-18 NOTE — PROGRESS NOTES
1920 Bedside shift change report given to [address] [place] [address] (oncoming nurse) by Lashon Atkins RN (offgoing nurse). Report included the following information SBAR, Intake/Output, MAR and Recent Results. 1945 Iron finished infusing. Called cab for patient ride to home. IV removed. 2015 Patient discharged in stable condition. Patient wheeled downstairs and assisted into a cab for a ride to home.

## 2019-07-01 ENCOUNTER — OFFICE VISIT (OUTPATIENT)
Dept: ONCOLOGY | Age: 69
End: 2019-07-01

## 2019-07-01 ENCOUNTER — HOSPITAL ENCOUNTER (OUTPATIENT)
Dept: INFUSION THERAPY | Age: 69
Discharge: HOME | End: 2019-07-01
Payer: MEDICARE

## 2019-07-01 VITALS
DIASTOLIC BLOOD PRESSURE: 62 MMHG | SYSTOLIC BLOOD PRESSURE: 105 MMHG | BODY MASS INDEX: 29.4 KG/M2 | HEART RATE: 67 BPM | TEMPERATURE: 97.9 F | WEIGHT: 202 LBS

## 2019-07-01 VITALS
HEART RATE: 67 BPM | OXYGEN SATURATION: 99 % | RESPIRATION RATE: 18 BRPM | TEMPERATURE: 97.9 F | DIASTOLIC BLOOD PRESSURE: 62 MMHG | SYSTOLIC BLOOD PRESSURE: 105 MMHG | WEIGHT: 202 LBS | BODY MASS INDEX: 29.4 KG/M2

## 2019-07-01 DIAGNOSIS — K27.9 H PYLORI ULCER: ICD-10-CM

## 2019-07-01 DIAGNOSIS — B96.81 H PYLORI ULCER: ICD-10-CM

## 2019-07-01 DIAGNOSIS — D75.839 THROMBOCYTOSIS: ICD-10-CM

## 2019-07-01 DIAGNOSIS — D64.9 ANEMIA, UNSPECIFIED TYPE: Primary | ICD-10-CM

## 2019-07-01 DIAGNOSIS — D64.9 ANEMIA, UNSPECIFIED TYPE: ICD-10-CM

## 2019-07-01 LAB
ALBUMIN SERPL-MCNC: 3.3 G/DL (ref 3.4–5)
ALBUMIN/GLOB SERPL: 0.6 {RATIO} (ref 0.8–1.7)
ALP SERPL-CCNC: 77 U/L (ref 45–117)
ALT SERPL-CCNC: 23 U/L (ref 16–61)
ANION GAP SERPL CALC-SCNC: 5 MMOL/L (ref 3–18)
AST SERPL-CCNC: 34 U/L (ref 15–37)
BASO+EOS+MONOS # BLD AUTO: 0.8 K/UL (ref 0–2.3)
BASO+EOS+MONOS NFR BLD AUTO: 18 % (ref 0.1–17)
BILIRUB SERPL-MCNC: 0.6 MG/DL (ref 0.2–1)
BUN SERPL-MCNC: 10 MG/DL (ref 7–18)
BUN/CREAT SERPL: 11 (ref 12–20)
CALCIUM SERPL-MCNC: 8.9 MG/DL (ref 8.5–10.1)
CHLORIDE SERPL-SCNC: 107 MMOL/L (ref 100–108)
CO2 SERPL-SCNC: 27 MMOL/L (ref 21–32)
CREAT SERPL-MCNC: 0.88 MG/DL (ref 0.6–1.3)
DIFFERENTIAL METHOD BLD: ABNORMAL
ERYTHROCYTE [DISTWIDTH] IN BLOOD BY AUTOMATED COUNT: 25.6 % (ref 11.5–14.5)
FERRITIN SERPL-MCNC: 108 NG/ML (ref 8–388)
GLOBULIN SER CALC-MCNC: 5.3 G/DL (ref 2–4)
GLUCOSE SERPL-MCNC: 83 MG/DL (ref 74–99)
HCT VFR BLD AUTO: 37.7 % (ref 36–48)
HGB BLD-MCNC: 10.9 G/DL (ref 12–16)
IRON SATN MFR SERPL: 13 %
IRON SERPL-MCNC: 53 UG/DL (ref 50–175)
LYMPHOCYTES # BLD: 1.1 K/UL (ref 1.1–5.9)
LYMPHOCYTES NFR BLD: 25 % (ref 14–44)
MCH RBC QN AUTO: 26.3 PG (ref 25–35)
MCHC RBC AUTO-ENTMCNC: 28.9 G/DL (ref 31–37)
MCV RBC AUTO: 91.1 FL (ref 78–102)
NEUTS SEG # BLD: 2.6 K/UL (ref 1.8–9.5)
NEUTS SEG NFR BLD: 58 % (ref 40–70)
PLATELET # BLD AUTO: 233 K/UL (ref 135–420)
POTASSIUM SERPL-SCNC: 4 MMOL/L (ref 3.5–5.5)
PROT SERPL-MCNC: 8.6 G/DL (ref 6.4–8.2)
RBC # BLD AUTO: 4.14 M/UL (ref 4.1–5.1)
SODIUM SERPL-SCNC: 139 MMOL/L (ref 136–145)
TIBC SERPL-MCNC: 399 UG/DL (ref 250–450)
WBC # BLD AUTO: 4.5 K/UL (ref 4.5–13)

## 2019-07-01 PROCEDURE — 80053 COMPREHEN METABOLIC PANEL: CPT

## 2019-07-01 PROCEDURE — 82728 ASSAY OF FERRITIN: CPT

## 2019-07-01 PROCEDURE — 85025 COMPLETE CBC W/AUTO DIFF WBC: CPT

## 2019-07-01 PROCEDURE — 83540 ASSAY OF IRON: CPT

## 2019-07-01 PROCEDURE — 36415 COLL VENOUS BLD VENIPUNCTURE: CPT

## 2019-07-01 PROCEDURE — 85049 AUTOMATED PLATELET COUNT: CPT

## 2019-07-01 RX ORDER — OMEPRAZOLE 40 MG/1
40 CAPSULE, DELAYED RELEASE ORAL 2 TIMES DAILY
Qty: 28 CAP | Refills: 0 | Status: SHIPPED | OUTPATIENT
Start: 2019-07-01 | End: 2019-07-15

## 2019-07-01 RX ORDER — METRONIDAZOLE 500 MG/1
500 TABLET ORAL 3 TIMES DAILY
Qty: 42 TAB | Refills: 0 | Status: SHIPPED | OUTPATIENT
Start: 2019-07-01 | End: 2019-07-15

## 2019-07-01 RX ORDER — CLARITHROMYCIN 500 MG/1
500 TABLET, FILM COATED ORAL 2 TIMES DAILY
Qty: 28 TAB | Refills: 0 | Status: SHIPPED | OUTPATIENT
Start: 2019-07-01 | End: 2019-07-15
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
## 2019-07-01 NOTE — PROGRESS NOTES
Edin Rodriguez is a 80 y.o. male presenting today for a new patient appointment r/t anemia. Patient is ambulatory with no assistive devices and denies any complaints. Chief Complaint   Patient presents with    Anemia     new patient       Visit Vitals  /62 (BP 1 Location: Right arm, BP Patient Position: Sitting)   Pulse 67   Temp 97.9 °F (36.6 °C) (Oral)   Resp 18   Wt 91.6 kg (202 lb)   SpO2 99%   BMI 29.40 kg/m²       Current Outpatient Medications   Medication Sig    pantoprazole (PROTONIX) 20 mg tablet Take 1 Tab by mouth Daily (before breakfast).  Iron BisGl &PS Ohj-G-M90-FA-Ca (ENZO FORTE) 844-72-53-4 mg-mg-mcg-mg cap Take 1 Cap by mouth daily. No current facility-administered medications for this visit. Medications no longer taking/discontinued: none    Fall Risk Assessment, last 12 mths 7/1/2019   Able to walk? Yes   Fall in past 12 months? Yes   Fall with injury? No   Number of falls in past 12 months 7   Fall Risk Score 7       3 most recent PHQ Screens 7/1/2019   Little interest or pleasure in doing things Not at all   Feeling down, depressed, irritable, or hopeless Not at all   Total Score PHQ 2 0       Abuse Screening Questionnaire 7/1/2019   Do you ever feel afraid of your partner? N   Are you in a relationship with someone who physically or mentally threatens you? N   Is it safe for you to go home?  Y       Learning Assessment 7/1/2019   PRIMARY LEARNER Patient   HIGHEST LEVEL OF EDUCATION - PRIMARY LEARNER  GRADUATED HIGH SCHOOL OR GED   PRIMARY LANGUAGE ENGLISH   LEARNER PREFERENCE PRIMARY READING     LISTENING   ANSWERED BY self    RELATIONSHIP SELF

## 2019-07-01 NOTE — PROGRESS NOTES
[facility] Progress Note    Date: 2019    Name: Antony Garcia    MRN: [id number]         : 1950    Peripheral Lab Draw      Mr. Mariposa Rogers to [facility], ambulatory at 1330 accompanied by self. Pt was assessed and education was provided. Mr. Arndt's vitals were reviewed and patient was observed for 5 minutes prior to treatment. Visit Vitals  /62 (BP 1 Location: Right arm, BP Patient Position: Sitting)   Pulse 67   Temp 97.9 °F (36.6 °C) (Oral)   Wt 91.6 kg (202 lb)   BMI 29.40 kg/m²     Recent Results (from the past 12 hour(s))   CBC WITH 3 PART DIFF    Collection Time: 19  1:35 PM   Result Value Ref Range    WBC 4.5 4.5 - 13.0 K/uL    RBC 4.14 4.10 - 5.10 M/uL    HGB 10.9 (L) 12.0 - 16.0 g/dL    HCT 37.7 36 - 48 %    MCV 91.1 78 - 102 FL    MCH 26.3 25.0 - 35.0 PG    MCHC 28.9 (L) 31 - 37 g/dL    RDW 25.6 (H) 11.5 - 14.5 %    NEUTROPHILS 58 40 - 70 %    MIXED CELLS 18 (H) 0.1 - 17 %    LYMPHOCYTES 25 14 - 44 %    ABS. NEUTROPHILS 2.6 1.8 - 9.5 K/UL    ABS. MIXED CELLS 0.8 0.0 - 2.3 K/uL    ABS. LYMPHOCYTES 1.1 1.1 - 5.9 K/UL    DF AUTOMATED         Blood obtained peripherally from left arm x 1 attempt with butterfly needle and sent to lab for Cbc w/diff, Cmp, Ferritin, Iron Profile, Vitamin B12 & Folate  per written orders. No bleeding or hematoma noted at site. Gauze and coban applied. Mr. Mariposa Rogers tolerated the phlebotomy, and had no complaints. Patient armband removed and shredded. Mr. Mariposa Rogers was discharged from [place] [id number] in stable condition at 1340.      Joe Vogt Phlebotomist PCT  2019  1:58 PM

## 2019-07-02 ENCOUNTER — TELEPHONE (OUTPATIENT)
Dept: ONCOLOGY | Age: 69
End: 2019-07-02

## 2019-07-02 NOTE — TELEPHONE ENCOUNTER
Pt states the medications that were called in yesterday were over $120 dollars. Pt states he is unable to afford his medications and was unable to pickup from the pharm. He wants to know if the medications that were called in ar generic? Or is there something else we can do to help assist him in his medication cost. Please call patient.

## 2019-07-11 ENCOUNTER — HOSPITAL ENCOUNTER (OUTPATIENT)
Dept: INFUSION THERAPY | Age: 69
End: 2019-07-11
Payer: MEDICARE

## 2019-07-22 ENCOUNTER — HOSPITAL ENCOUNTER (OUTPATIENT)
Dept: INFUSION THERAPY | Age: 69
End: 2019-07-22
Payer: MEDICARE

## 2019-07-29 ENCOUNTER — HOSPITAL ENCOUNTER (OUTPATIENT)
Dept: INFUSION THERAPY | Age: 69
End: 2019-07-29
Payer: MEDICARE

## 2019-08-06 ENCOUNTER — HOSPITAL ENCOUNTER (EMERGENCY)
Age: 69
Discharge: HOME | End: 2019-08-06
Attending: EMERGENCY MEDICINE
Payer: MEDICAID

## 2019-08-06 VITALS
BODY MASS INDEX: 25.77 KG/M2 | HEIGHT: 69 IN | WEIGHT: 174 LBS | RESPIRATION RATE: 16 BRPM | DIASTOLIC BLOOD PRESSURE: 87 MMHG | OXYGEN SATURATION: 98 % | HEART RATE: 106 BPM | TEMPERATURE: 97.4 F | SYSTOLIC BLOOD PRESSURE: 130 MMHG

## 2019-08-06 DIAGNOSIS — F10.929 ALCOHOLIC INTOXICATION WITH COMPLICATION (HCC): ICD-10-CM

## 2019-08-06 DIAGNOSIS — R04.0 EPISTAXIS: Primary | ICD-10-CM

## 2019-08-06 LAB
ALBUMIN SERPL-MCNC: 3.4 G/DL (ref 3.4–5)
ALBUMIN/GLOB SERPL: 0.7 {RATIO} (ref 0.8–1.7)
ALP SERPL-CCNC: 108 U/L (ref 45–117)
ALT SERPL-CCNC: 43 U/L (ref 16–61)
ANION GAP SERPL CALC-SCNC: 5 MMOL/L (ref 3–18)
AST SERPL-CCNC: 122 U/L (ref 10–38)
BASOPHILS # BLD: 0.1 K/UL (ref 0–0.1)
BASOPHILS NFR BLD: 2 % (ref 0–2)
BILIRUB SERPL-MCNC: 0.3 MG/DL (ref 0.2–1)
BUN SERPL-MCNC: 11 MG/DL (ref 7–18)
BUN/CREAT SERPL: 9 (ref 12–20)
CALCIUM SERPL-MCNC: 8.2 MG/DL (ref 8.5–10.1)
CHLORIDE SERPL-SCNC: 108 MMOL/L (ref 100–111)
CO2 SERPL-SCNC: 27 MMOL/L (ref 21–32)
CREAT SERPL-MCNC: 1.19 MG/DL (ref 0.6–1.3)
DIFFERENTIAL METHOD BLD: ABNORMAL
EOSINOPHIL # BLD: 0.1 K/UL (ref 0–0.4)
EOSINOPHIL NFR BLD: 2 % (ref 0–5)
ERYTHROCYTE [DISTWIDTH] IN BLOOD BY AUTOMATED COUNT: 19.9 % (ref 11.6–14.5)
ETHANOL SERPL-MCNC: 355 MG/DL (ref 0–3)
GLOBULIN SER CALC-MCNC: 4.7 G/DL (ref 2–4)
GLUCOSE SERPL-MCNC: 100 MG/DL (ref 74–99)
HCT VFR BLD AUTO: 35.1 % (ref 36–48)
HGB BLD-MCNC: 11.1 G/DL (ref 13–16)
LYMPHOCYTES # BLD: 1.9 K/UL (ref 0.9–3.6)
LYMPHOCYTES NFR BLD: 46 % (ref 21–52)
MCH RBC QN AUTO: 26.1 PG (ref 24–34)
MCHC RBC AUTO-ENTMCNC: 31.6 G/DL (ref 31–37)
MCV RBC AUTO: 82.6 FL (ref 74–97)
MONOCYTES # BLD: 0.8 K/UL (ref 0.05–1.2)
MONOCYTES NFR BLD: 20 % (ref 3–10)
NEUTS SEG # BLD: 1.2 K/UL (ref 1.8–8)
NEUTS SEG NFR BLD: 30 % (ref 40–73)
PLATELET # BLD AUTO: 262 K/UL (ref 135–420)
PMV BLD AUTO: 9 FL (ref 9.2–11.8)
POTASSIUM SERPL-SCNC: 3.8 MMOL/L (ref 3.5–5.5)
PROT SERPL-MCNC: 8.1 G/DL (ref 6.4–8.2)
RBC # BLD AUTO: 4.25 M/UL (ref 4.7–5.5)
SODIUM SERPL-SCNC: 140 MMOL/L (ref 136–145)
WBC # BLD AUTO: 4 K/UL (ref 4.6–13.2)

## 2019-08-06 PROCEDURE — 85025 COMPLETE CBC W/AUTO DIFF WBC: CPT

## 2019-08-06 PROCEDURE — 80307 DRUG TEST PRSMV CHEM ANLYZR: CPT

## 2019-08-06 PROCEDURE — 99283 EMERGENCY DEPT VISIT LOW MDM: CPT

## 2019-08-06 PROCEDURE — 80053 COMPREHEN METABOLIC PANEL: CPT

## 2019-08-06 RX ORDER — OXYMETAZOLINE HCL 0.05 %
2 SPRAY, NON-AEROSOL (ML) NASAL
Status: DISCONTINUED | OUTPATIENT
Start: 2019-08-06 | End: 2019-08-07 | Stop reason: HOSPADM

## 2019-08-07 ENCOUNTER — HOSPITAL ENCOUNTER (EMERGENCY)
Age: 69
Discharge: HOME | End: 2019-08-07
Attending: EMERGENCY MEDICINE
Payer: MEDICAID

## 2019-08-07 VITALS
HEIGHT: 69 IN | RESPIRATION RATE: 15 BRPM | HEART RATE: 87 BPM | WEIGHT: 194 LBS | TEMPERATURE: 98.3 F | BODY MASS INDEX: 28.73 KG/M2 | OXYGEN SATURATION: 99 % | SYSTOLIC BLOOD PRESSURE: 132 MMHG | DIASTOLIC BLOOD PRESSURE: 74 MMHG

## 2019-08-07 DIAGNOSIS — F10.920 ALCOHOLIC INTOXICATION WITHOUT COMPLICATION (HCC): ICD-10-CM

## 2019-08-07 DIAGNOSIS — R04.0 EPISTAXIS: Primary | ICD-10-CM

## 2019-08-07 LAB
BASOPHILS # BLD: 0.1 K/UL (ref 0–0.1)
BASOPHILS NFR BLD: 2 % (ref 0–2)
DIFFERENTIAL METHOD BLD: ABNORMAL
EOSINOPHIL # BLD: 0.1 K/UL (ref 0–0.4)
EOSINOPHIL NFR BLD: 3 % (ref 0–5)
ERYTHROCYTE [DISTWIDTH] IN BLOOD BY AUTOMATED COUNT: 19.5 % (ref 11.6–14.5)
ETHANOL SERPL-MCNC: 213 MG/DL (ref 0–3)
HCT VFR BLD AUTO: 36.2 % (ref 36–48)
HGB BLD-MCNC: 11.6 G/DL (ref 13–16)
LYMPHOCYTES # BLD: 1.4 K/UL (ref 0.9–3.6)
LYMPHOCYTES NFR BLD: 44 % (ref 21–52)
MCH RBC QN AUTO: 26.6 PG (ref 24–34)
MCHC RBC AUTO-ENTMCNC: 32 G/DL (ref 31–37)
MCV RBC AUTO: 83 FL (ref 74–97)
MONOCYTES # BLD: 0.6 K/UL (ref 0.05–1.2)
MONOCYTES NFR BLD: 19 % (ref 3–10)
NEUTS SEG # BLD: 1 K/UL (ref 1.8–8)
NEUTS SEG NFR BLD: 32 % (ref 40–73)
PLATELET # BLD AUTO: 238 K/UL (ref 135–420)
PMV BLD AUTO: 9.1 FL (ref 9.2–11.8)
RBC # BLD AUTO: 4.36 M/UL (ref 4.7–5.5)
WBC # BLD AUTO: 3.1 K/UL (ref 4.6–13.2)

## 2019-08-07 PROCEDURE — 80307 DRUG TEST PRSMV CHEM ANLYZR: CPT

## 2019-08-07 PROCEDURE — 99284 EMERGENCY DEPT VISIT MOD MDM: CPT

## 2019-08-07 PROCEDURE — 85025 COMPLETE CBC W/AUTO DIFF WBC: CPT

## 2019-08-07 NOTE — DISCHARGE INSTRUCTIONS
Patient Education        Nosebleeds: Care Instructions  Your Care Instructions    Nosebleeds are common, especially if you have colds or allergies. Many things can cause a nosebleed. Some nosebleeds stop on their own with pressure. Others need packing. Some get cauterized (sealed). If you have gauze or other packing materials in your nose, you will need to follow up with your doctor to have the packing removed. You may need more treatment if you get nosebleeds a lot. The doctor has checked you carefully, but problems can develop later. If you notice any problems or new symptoms, get medical treatment right away. Follow-up care is a key part of your treatment and safety. Be sure to make and go to all appointments, and call your doctor if you are having problems. It's also a good idea to know your test results and keep a list of the medicines you take. How can you care for yourself at home? · If you get another nosebleed:  ? Sit up and tilt your head slightly forward. This keeps blood from going down your throat. ? Use your thumb and index finger to pinch your nose shut for 10 minutes. Use a clock. Do not check to see if the bleeding has stopped before the 10 minutes are up. If the bleeding has not stopped, pinch your nose shut for another 10 minutes. ? When the bleeding has stopped, try not to pick, rub, or blow your nose for 12 hours. Avoiding these things helps keep your nose from bleeding again. · If your doctor prescribed antibiotics, take them as directed. Do not stop taking them just because you feel better. You need to take the full course of antibiotics. To prevent nosebleeds  · Do not blow your nose too hard. · Try not to lift or strain after a nosebleed. · Raise your head on a pillow while you sleep. · Put a thin layer of a saline- or water-based nasal gel, such as NasoGel, inside your nose. Put it on the septum, which divides your nostrils.  This will prevent dryness that can cause nosebleeds. · Use a vaporizer or humidifier to add moisture to your bedroom. Follow the directions for cleaning the machine. · Do not use aspirin, ibuprofen (Advil, Motrin), or naproxen (Aleve) for 36 to 48 hours after a nosebleed unless your doctor tells you to. You can use acetaminophen (Tylenol) for pain relief. · Talk to your doctor about stopping any other medicines you are taking. Some medicines may make you more likely to get a nosebleed. · Do not use cold medicines or nasal sprays without first talking to your doctor. They can make your nose dry. When should you call for help? Call 911 anytime you think you may need emergency care. For example, call if:    · You passed out (lost consciousness).    Call your doctor now or seek immediate medical care if:    · You get another nosebleed and your nose is still bleeding after you have applied pressure 3 times for 10 minutes each time (30 minutes total).     · There is a lot of blood running down the back of your throat even after you pinch your nose and tilt your head forward.     · You have a fever.     · You have sinus pain.    Watch closely for changes in your health, and be sure to contact your doctor if:    · You get nosebleeds often, even if they stop.     · You do not get better as expected. Where can you learn more? Go to http://sha[facility]-licha.info/. Enter S156 in the search box to learn more about \"Nosebleeds: Care Instructions. \"  Current as of: September 23, 2018  Content Version: 12.1  © 0371-7889 [employer]. Care instructions adapted under license by [employer] (which disclaims liability or warranty for this information). If you have questions about a medical condition or this instruction, always ask your healthcare professional. [address] any warranty or liability for your use of this information.
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
## 2019-08-07 NOTE — ED PROVIDER NOTES
Edmundo Rogers is a 80 y.o. Male With history of alcohol abuse states he has no started bleeding earlier this evening. Patient states he was blowing his nose started bleeding. The time patient got here to stop. Patient states is having plenty at times for the past.  He denies any trauma to it. He has not been coughing up any blood or vomiting blood. Patient admits to alcohol use earlier. He is on any anticoagulants. Patient has a prior history of transfusion but has not had any blood in stool or melena recently. Patient has no other complaints at this time    The history is provided by the patient and medical records. Past Medical History:   Diagnosis Date    Eye inflamed     Ill-defined condition     Pt passes out  does not know why. happened 15 times gonzalez. Last time ; Aug 2018       Past Surgical History:   Procedure Laterality Date    COLONOSCOPY N/A 6/17/2019    COLONOSCOPY performed by Ozzie Sarmiento MD at [facility] ENDOSCOPY    HX CATARACT REMOVAL Left 01/24/2018         Family History:   Problem Relation Age of Onset    Cancer Mother     Diabetes Mother     Cancer Father        Social History     Socioeconomic History    Marital status:      Spouse name: Not on file    Number of children: Not on file    Years of education: Not on file    Highest education level: Not on file   Occupational History    Not on file   Social Needs    Financial resource strain: Not on file    Food insecurity:     Worry: Not on file     Inability: Not on file    Transportation needs:     Medical: Not on file     Non-medical: Not on file   Tobacco Use    Smoking status: Current Every Day Smoker     Packs/day: 0.50     Years: 45.00     Pack years: 22.50    Smokeless tobacco: Never Used   Substance and Sexual Activity    Alcohol use: Yes     Comment: 3-4 16 oz beers daily    Drug use: No    Sexual activity: Not on file   Lifestyle    Physical activity:     Days per week: Not on file Minutes per session: Not on file    Stress: Not on file   Relationships    Social connections:     Talks on phone: Not on file     Gets together: Not on file     Attends Zoroastrian service: Not on file     Active member of club or organization: Not on file     Attends meetings of clubs or organizations: Not on file     Relationship status: Not on file    Intimate partner violence:     Fear of current or ex partner: Not on file     Emotionally abused: Not on file     Physically abused: Not on file     Forced sexual activity: Not on file   Other Topics Concern    Not on file   Social History Narrative    Not on file         ALLERGIES: Patient has no known allergies. Review of Systems   Constitutional: Negative for fever. HENT: Positive for nosebleeds. Negative for sore throat and trouble swallowing. Respiratory: Negative for shortness of breath. Cardiovascular: Negative for chest pain. Gastrointestinal: Negative for abdominal pain. Genitourinary: Negative for difficulty urinating. Musculoskeletal: Negative for gait problem. Skin: Negative for wound. Neurological: Negative for syncope. Hematological: Does not bruise/bleed easily. Psychiatric/Behavioral: Positive for sleep disturbance. Vitals:    08/06/19 2012   BP: 130/87   Pulse: (!) 106   Resp: 16   Temp: 97.4 °F (36.3 °C)   SpO2: 98%   Weight: 78.9 kg (174 lb)   Height: 5' 9\" (1.753 m)            Physical Exam   Constitutional: He is oriented to person, place, and time. He appears well-developed and well-nourished. Non-toxic appearance. He does not appear ill. He appears distressed. Patient obviously clinically intoxicated. HENT:   Head: Normocephalic and atraumatic. Right Ear: External ear normal.   Left Ear: External ear normal.   Nose: Epistaxis (There is dried blood in the left nare with no active bleeding. There is no obvious source of bleeding.) is observed.    Mouth/Throat: Oropharynx is clear and moist. No oropharyngeal exudate. There is no blood in the posterior pharynx   Eyes: Conjunctivae are normal.   Neck: Normal range of motion. Cardiovascular: Normal rate, regular rhythm, normal heart sounds and intact distal pulses. Pulmonary/Chest: Effort normal and breath sounds normal. No respiratory distress. Abdominal: Soft. There is no tenderness. Musculoskeletal: Normal range of motion. He exhibits no edema. Neurological: He is alert and oriented to person, place, and time. Skin: Skin is warm and dry. Capillary refill takes less than 2 seconds. He is not diaphoretic. Psychiatric: His behavior is normal.   Nursing note and vitals reviewed. MDM       Procedures  Vitals:  Patient Vitals for the past 12 hrs:   Temp Pulse Resp BP SpO2   08/06/19 2012 97.4 °F (36.3 °C) (!) 106 16 130/87 98 %         Medications ordered:   Medications   sodium chloride 0.9 % bolus infusion 1,000 mL (has no administration in time range)   oxymetazoline (AFRIN) 0.05 % nasal spray 2 Spray (has no administration in time range)         Lab findings:  Recent Results (from the past 12 hour(s))   CBC WITH AUTOMATED DIFF    Collection Time: 08/06/19  9:17 PM   Result Value Ref Range    WBC 4.0 (L) 4.6 - 13.2 K/uL    RBC 4.25 (L) 4.70 - 5.50 M/uL    HGB 11.1 (L) 13.0 - 16.0 g/dL    HCT 35.1 (L) 36.0 - 48.0 %    MCV 82.6 74.0 - 97.0 FL    MCH 26.1 24.0 - 34.0 PG    MCHC 31.6 31.0 - 37.0 g/dL    RDW 19.9 (H) 11.6 - 14.5 %    PLATELET 767 299 - 772 K/uL    MPV 9.0 (L) 9.2 - 11.8 FL    NEUTROPHILS 30 (L) 40 - 73 %    LYMPHOCYTES 46 21 - 52 %    MONOCYTES 20 (H) 3 - 10 %    EOSINOPHILS 2 0 - 5 %    BASOPHILS 2 0 - 2 %    ABS. NEUTROPHILS 1.2 (L) 1.8 - 8.0 K/UL    ABS. LYMPHOCYTES 1.9 0.9 - 3.6 K/UL    ABS. MONOCYTES 0.8 0.05 - 1.2 K/UL    ABS. EOSINOPHILS 0.1 0.0 - 0.4 K/UL    ABS.  BASOPHILS 0.1 0.0 - 0.1 K/UL    DF AUTOMATED     METABOLIC PANEL, COMPREHENSIVE    Collection Time: 08/06/19  9:17 PM   Result Value Ref Range    Sodium 140 136 - 145 mmol/L    Potassium 3.8 3.5 - 5.5 mmol/L    Chloride 108 100 - 111 mmol/L    CO2 27 21 - 32 mmol/L    Anion gap 5 3.0 - 18 mmol/L    Glucose 100 (H) 74 - 99 mg/dL    BUN 11 7.0 - 18 MG/DL    Creatinine 1.19 0.6 - 1.3 MG/DL    BUN/Creatinine ratio 9 (L) 12 - 20      GFR est AA >60 >60 ml/min/1.73m2    GFR est non-AA >60 >60 ml/min/1.73m2    Calcium 8.2 (L) 8.5 - 10.1 MG/DL    Bilirubin, total 0.3 0.2 - 1.0 MG/DL    ALT (SGPT) 43 16 - 61 U/L    AST (SGOT) 122 (H) 10 - 38 U/L    Alk. phosphatase 108 45 - 117 U/L    Protein, total 8.1 6.4 - 8.2 g/dL    Albumin 3.4 3.4 - 5.0 g/dL    Globulin 4.7 (H) 2.0 - 4.0 g/dL    A-G Ratio 0.7 (L) 0.8 - 1.7     ETHYL ALCOHOL    Collection Time: 08/06/19  9:17 PM   Result Value Ref Range    ALCOHOL(ETHYL),SERUM 355 (HH) 0 - 3 MG/DL       EKG interpretation by ED Physician:      X-Ray, CT or other radiology findings or impressions:  No orders to display       Progress notes, Consult notes or additional Procedure notes:   Patient is awake and alert here with no further bleeding from his nose. There is no evidence of posterior bleeding either. Patient has chronic alcohol abuse and intoxication. He is awake and alert and ambulatory here without difficulty. Do not feel patient requires any other work-up at this time    I have discussed with patient and/or family/sig other the results, interpretation of any imaging if performed, suspected diagnosis and treatment plan to include instructions regarding the diagnoses listed to which understanding was expressed with all questions answered      Reevaluation of patient:   stable    Disposition:  Diagnosis:   1. Epistaxis    2.  Alcoholic intoxication with complication ([facility] [address]        Disposition: home      Follow-up Information     Follow up With Specialties Details Why [address]  Schedule an appointment as soon as possible for a visit  [phone] [facility] EMERGENCY DEPT Emergency Medicine  If symptoms worsen [address]  [phone]            Patient's Medications   Start Taking    No medications on file   Continue Taking    IRON BISGL &PS [id number] (Marcio Doran) 649-11-34-1 MG-MG-MCG-MG CAP    Take 1 Cap by mouth daily. PANTOPRAZOLE (PROTONIX) 20 MG TABLET    Take 1 Tab by mouth Daily (before breakfast).    These Medications have changed    No medications on file   Stop Taking    No medications on file

## 2019-08-07 NOTE — ED NOTES
Discharge information reviewed with patient, patient verbalized understanding. Paperwork signed, and patient in no distress. Patient awaiting insurance cab in lobby.

## 2019-08-07 NOTE — ED NOTES
Patient adamantly denying any treatment. Patient will not stay connected to the monitor, has attempted multiple times to remove his IV, and is demanding to leave. Provider notified and speaking with patient.

## 2019-08-07 NOTE — ED TRIAGE NOTES
Pt brought in via EMS ambulatory into triage for c/o nosebleed, he is unable to tell when started. Noted pt /w dried blood in his hands and on chin but nose not bleeding currently. Pt denies pain, denies chest pain, denies SOB, denies N/V/D. admist to ETOH use tonight.

## 2019-08-08 NOTE — ED TRIAGE NOTES
Pt reports nose bleeding, which had stopped by the time he go to triage. Stated he had the same problem two days ago.

## 2019-08-08 NOTE — DISCHARGE INSTRUCTIONS
Patient Education        Nosebleeds: Care Instructions  Your Care Instructions    Nosebleeds are common, especially if you have colds or allergies. Many things can cause a nosebleed. Some nosebleeds stop on their own with pressure. Others need packing. Some get cauterized (sealed). If you have gauze or other packing materials in your nose, you will need to follow up with your doctor to have the packing removed. You may need more treatment if you get nosebleeds a lot. The doctor has checked you carefully, but problems can develop later. If you notice any problems or new symptoms, get medical treatment right away. Follow-up care is a key part of your treatment and safety. Be sure to make and go to all appointments, and call your doctor if you are having problems. It's also a good idea to know your test results and keep a list of the medicines you take. How can you care for yourself at home? · If you get another nosebleed:  ? Sit up and tilt your head slightly forward. This keeps blood from going down your throat. ? Use your thumb and index finger to pinch your nose shut for 10 minutes. Use a clock. Do not check to see if the bleeding has stopped before the 10 minutes are up. If the bleeding has not stopped, pinch your nose shut for another 10 minutes. ? When the bleeding has stopped, try not to pick, rub, or blow your nose for 12 hours. Avoiding these things helps keep your nose from bleeding again. · If your doctor prescribed antibiotics, take them as directed. Do not stop taking them just because you feel better. You need to take the full course of antibiotics. To prevent nosebleeds  · Do not blow your nose too hard. · Try not to lift or strain after a nosebleed. · Raise your head on a pillow while you sleep. · Put a thin layer of a saline- or water-based nasal gel, such as NasoGel, inside your nose. Put it on the septum, which divides your nostrils.  This will prevent dryness that can cause nosebleeds. · Use a vaporizer or humidifier to add moisture to your bedroom. Follow the directions for cleaning the machine. · Do not use aspirin, ibuprofen (Advil, Motrin), or naproxen (Aleve) for 36 to 48 hours after a nosebleed unless your doctor tells you to. You can use acetaminophen (Tylenol) for pain relief. · Talk to your doctor about stopping any other medicines you are taking. Some medicines may make you more likely to get a nosebleed. · Do not use cold medicines or nasal sprays without first talking to your doctor. They can make your nose dry. When should you call for help? Call 911 anytime you think you may need emergency care. For example, call if:    · You passed out (lost consciousness).    Call your doctor now or seek immediate medical care if:    · You get another nosebleed and your nose is still bleeding after you have applied pressure 3 times for 10 minutes each time (30 minutes total).     · There is a lot of blood running down the back of your throat even after you pinch your nose and tilt your head forward.     · You have a fever.     · You have sinus pain.    Watch closely for changes in your health, and be sure to contact your doctor if:    · You get nosebleeds often, even if they stop.     · You do not get better as expected. Where can you learn more? Go to http://shaquille-licha.info/. Enter S156 in the search box to learn more about \"Nosebleeds: Care Instructions. \"  Current as of: September 23, 2018  Content Version: 12.1  © 8783-4029 [employer]. Care instructions adapted under license by [employer] (which disclaims liability or warranty for this information). If you have questions about a medical condition or this instruction, always ask your healthcare professional. [address] any warranty or liability for your use of this information.

## 2019-08-08 NOTE — ED NOTES
PT A&OX4, patent airway, non-labored breathing, PERRLA, skin warm/dry, C/O nose bleeds x 2 days.  Not actively bleeding, NAD

## 2019-08-08 NOTE — ED PROVIDER NOTES
EMERGENCY DEPARTMENT HISTORY AND PHYSICAL EXAM    Date: 8/7/2019  Patient Name: Misael Lanza    History of Presenting Illness     Chief Complaint   Patient presents with    Epistaxis         History Provided By: Patient    Chief Complaint: epistaxis  Duration: 2 Hours  Timing:  Acute  Location: left nare  Quality: n/a  Severity: Moderate  Modifying Factors: none  Associated Symptoms: denies any other associated signs or symptoms      HPI: Misael Lanza is a 80 y.o. male with a PMH of No significant past medical history who presents to the ER via EMS complaining of epistaxis. Patient states his symptoms began about 2 hours prior to ER arrival.  Bleeding was controlled by EMS prior to the hospital arrival.  Patient admits to drinking alcohol today. He has not had any recent falls or trauma. Patient states this is happened several times during the last few days. He was just seen in the hospital last night however his symptoms resolved and did not require any further intervention. He denied all other symptoms and complaints. PCP: [place], MD [place]    Current Outpatient Medications   Medication Sig Dispense Refill    Iron BisGl &PS [id number] (ENZO FORTE) 039-48-71-6 mg-mg-mcg-mg cap Take 1 Cap by mouth daily. 30 Cap 0    pantoprazole (PROTONIX) 20 mg tablet Take 1 Tab by mouth Daily (before breakfast). 30 Tab 0       Past History     Past Medical History:  Past Medical History:   Diagnosis Date    Eye inflamed     Ill-defined condition     Pt passes out  does not know why. happened 15 times gonzalez. Last time ; Aug 2018       Past Surgical History:  Past Surgical History:   Procedure Laterality Date    COLONOSCOPY N/A 6/17/2019    COLONOSCOPY performed by Dario Charles MD at [facility] ENDOSCOPY    HX CATARACT REMOVAL Left 01/24/2018       Family History:  Family History   Problem Relation Age of Onset    Cancer Mother     Diabetes Mother     Cancer Father        Social History:  Social History Tobacco Use    Smoking status: Current Every Day Smoker     Packs/day: 0.50     Years: 45.00     Pack years: 22.50    Smokeless tobacco: Never Used   Substance Use Topics    Alcohol use: Yes     Comment: 3-4 16 oz beers daily    Drug use: No       Allergies:  No Known Allergies      Review of Systems   Review of Systems   Constitutional: Negative for chills, fatigue and fever. HENT: Positive for nosebleeds. Negative for sore throat. Eyes: Negative. Respiratory: Negative for cough and shortness of breath. Cardiovascular: Negative for chest pain and palpitations. Gastrointestinal: Negative for abdominal pain, nausea and vomiting. Genitourinary: Negative for dysuria. Musculoskeletal: Negative. Skin: Negative. Neurological: Negative for dizziness, weakness, light-headedness and headaches. Psychiatric/Behavioral: Negative. All other systems reviewed and are negative. Physical Exam     Vitals:    08/07/19 2009 08/07/19 2224   BP: 141/79 132/74   Pulse: 99 87   Resp: 16 15   Temp: 97.9 °F (36.6 °C) 98.3 °F (36.8 °C)   SpO2: 100% 99%   Weight: 88 kg (194 lb)    Height: 5' 9\" (1.753 m)      Physical Exam   Constitutional: He is oriented to person, place, and time. He appears well-developed and well-nourished. No distress. Strong odor of alcohol   HENT:   Head: Normocephalic and atraumatic. Right Ear: External ear and ear canal normal.   Left Ear: External ear and ear canal normal.   Nose: Nose normal. No sinus tenderness, nasal deformity, septal deviation or nasal septal hematoma. No epistaxis. Mouth/Throat: Uvula is midline, oropharynx is clear and moist and mucous membranes are normal.   Right nare clear, left nare with dried red blood   Eyes: Conjunctivae are normal. No scleral icterus. Neck: Neck supple. No JVD present. No tracheal deviation present. Cardiovascular: Normal rate, regular rhythm and normal heart sounds. No murmur heard.   Pulmonary/Chest: Effort normal and breath sounds normal. No respiratory distress. He has no wheezes. He has no rales. Abdominal: Soft. There is no tenderness. Musculoskeletal: Normal range of motion. Neurological: He is alert and oriented to person, place, and time. He has normal strength. Gait normal. GCS eye subscore is 4. GCS verbal subscore is 5. GCS motor subscore is 6. Skin: Skin is warm and dry. He is not diaphoretic. Psychiatric: He has a normal mood and affect. Nursing note and vitals reviewed. Diagnostic Study Results     Labs -     Recent Results (from the past 12 hour(s))   CBC WITH AUTOMATED DIFF    Collection Time: 08/07/19  9:20 PM   Result Value Ref Range    WBC 3.1 (L) 4.6 - 13.2 K/uL    RBC 4.36 (L) 4.70 - 5.50 M/uL    HGB 11.6 (L) 13.0 - 16.0 g/dL    HCT 36.2 36.0 - 48.0 %    MCV 83.0 74.0 - 97.0 FL    MCH 26.6 24.0 - 34.0 PG    MCHC 32.0 31.0 - 37.0 g/dL    RDW 19.5 (H) 11.6 - 14.5 %    PLATELET 490 944 - 331 K/uL    MPV 9.1 (L) 9.2 - 11.8 FL    NEUTROPHILS 32 (L) 40 - 73 %    LYMPHOCYTES 44 21 - 52 %    MONOCYTES 19 (H) 3 - 10 %    EOSINOPHILS 3 0 - 5 %    BASOPHILS 2 0 - 2 %    ABS. NEUTROPHILS 1.0 (L) 1.8 - 8.0 K/UL    ABS. LYMPHOCYTES 1.4 0.9 - 3.6 K/UL    ABS. MONOCYTES 0.6 0.05 - 1.2 K/UL    ABS. EOSINOPHILS 0.1 0.0 - 0.4 K/UL    ABS. BASOPHILS 0.1 0.0 - 0.1 K/UL    DF AUTOMATED     ETHYL ALCOHOL    Collection Time: 08/07/19  9:20 PM   Result Value Ref Range    ALCOHOL(ETHYL),SERUM 213 (H) 0 - 3 MG/DL       Radiologic Studies -   No orders to display     CT Results  (Last 48 hours)    None        CXR Results  (Last 48 hours)    None            Medical Decision Making   I am the first provider for this patient. I reviewed the vital signs, available nursing notes, past medical history, past surgical history, family history and social history. Vital Signs-Reviewed the patient's vital signs.     Records Reviewed: Nursing Notes, Old Medical Records, Previous Radiology Studies and Previous Laboratory Studies     829 PM  60-year-old male brought to the ER by EMS for evaluation of epistaxis. Symptom onset 2 hours prior to ER arrival.  Patient states this is been going on intermittently for several days. Was seen last night for same problem. Blood pressure controlled here in the ER with stable vitals. Labs reviewed and noted to be unremarkable. Patient was elevated at the alcohol level. No bleeding while here in the ED. No indication for packing or other intervention at this time. Advised patient follow-up with his primary care provider and discuss strict return precautions. Patient stable for discharge. All questions answered and patient in agreement with plan of care. Will plan for discharge. Carolin Weinberg PA-C       Disposition:  discharged    DISCHARGE NOTE:       Care plan outlined and precautions discussed. Patient has no new complaints, changes, or physical findings. Results of labs were reviewed with the patient. All medications were reviewed with the patient; will d/c home with n/a. All of pt's questions and concerns were addressed. Patient was instructed and agrees to follow up with pcp, as well as to return to the ED upon further deterioration. Patient is ready to go home. Follow-up Information     Follow up With Specialties Details Why [address] [facility] EMERGENCY DEPT Emergency Medicine  If symptoms worsen [address]  [phone]    Your PCP  Call in 1 day ER follow up for nosebleeds           Discharge Medication List as of 8/7/2019 10:17 PM      CONTINUE these medications which have NOT CHANGED    Details   Iron BisGl &PS [id number] (Alberto Green) [id number] mg-mg-mcg-mg cap Take 1 Cap by mouth daily. , Print, Disp-30 Cap, R-0      pantoprazole (PROTONIX) 20 mg tablet Take 1 Tab by mouth Daily (before breakfast). , Print, Disp-30 Tab, R-0             Provider Notes (Medical Decision Making):     Procedures:  Procedures        Diagnosis Clinical Impression:   1. Epistaxis    2.  Alcoholic intoxication without complication ([address]

## 2019-08-27 ENCOUNTER — HOSPITAL ENCOUNTER (OUTPATIENT)
Dept: LAB | Age: 69
Discharge: HOME | End: 2019-08-27
Payer: MEDICAID

## 2019-08-27 ENCOUNTER — OFFICE VISIT (OUTPATIENT)
Dept: FAMILY MEDICINE CLINIC | Age: 69
End: 2019-08-27

## 2019-08-27 VITALS
SYSTOLIC BLOOD PRESSURE: 138 MMHG | DIASTOLIC BLOOD PRESSURE: 88 MMHG | RESPIRATION RATE: 16 BRPM | HEART RATE: 99 BPM | BODY MASS INDEX: 29.03 KG/M2 | WEIGHT: 196 LBS | OXYGEN SATURATION: 100 % | TEMPERATURE: 97.3 F | HEIGHT: 69 IN

## 2019-08-27 DIAGNOSIS — Z87.891 PERSONAL HISTORY OF TOBACCO USE, PRESENTING HAZARDS TO HEALTH: ICD-10-CM

## 2019-08-27 DIAGNOSIS — D62 ACUTE BLOOD LOSS ANEMIA: ICD-10-CM

## 2019-08-27 DIAGNOSIS — Z13.31 SCREENING FOR DEPRESSION: ICD-10-CM

## 2019-08-27 DIAGNOSIS — D64.9 ANEMIA, UNSPECIFIED TYPE: ICD-10-CM

## 2019-08-27 DIAGNOSIS — Z11.59 NEED FOR HEPATITIS C SCREENING TEST: ICD-10-CM

## 2019-08-27 DIAGNOSIS — Z23 ENCOUNTER FOR IMMUNIZATION: ICD-10-CM

## 2019-08-27 DIAGNOSIS — Z00.00 MEDICARE ANNUAL WELLNESS VISIT, SUBSEQUENT: Primary | ICD-10-CM

## 2019-08-27 DIAGNOSIS — Z13.39 SCREENING FOR ALCOHOLISM: ICD-10-CM

## 2019-08-27 DIAGNOSIS — R55 SYNCOPE, UNSPECIFIED SYNCOPE TYPE: ICD-10-CM

## 2019-08-27 DIAGNOSIS — Z13.6 SCREENING FOR CARDIOVASCULAR CONDITION: ICD-10-CM

## 2019-08-27 PROBLEM — F10.929 ALCOHOL INTOXICATION (HCC): Status: RESOLVED | Noted: 2018-08-04 | Resolved: 2019-08-27

## 2019-08-27 PROCEDURE — 80061 LIPID PANEL: CPT

## 2019-08-27 PROCEDURE — 36415 COLL VENOUS BLD VENIPUNCTURE: CPT

## 2019-08-27 PROCEDURE — 86803 HEPATITIS C AB TEST: CPT

## 2019-08-27 RX ORDER — PANTOPRAZOLE SODIUM 20 MG/1
20 TABLET, DELAYED RELEASE ORAL
Qty: 30 TAB | Refills: 0 | Status: SHIPPED | OUTPATIENT
Start: 2019-08-27 | End: 2020-01-07 | Stop reason: ALTCHOICE

## 2019-08-27 NOTE — PROGRESS NOTES
Ibis Peacock               [phone]    This is the Subsequent Medicare Annual Wellness Exam, performed 12 months or more after the Initial AWV or the last Subsequent AWV    I have reviewed the patient's medical history in detail and updated the computerized patient record. History     Past Medical History:   Diagnosis Date    Eye inflamed     Ill-defined condition     Pt passes out  does not know why. happened 15 times gonzalez. Last time ; Aug 2018      Past Surgical History:   Procedure Laterality Date    COLONOSCOPY N/A 6/17/2019    COLONOSCOPY performed by Sebas Mak [facility] at [facility] ENDOSCOPY    HX CATARACT REMOVAL Left 01/24/2018     Current Outpatient Medications   Medication Sig Dispense Refill    Iron BisGl &PS [id number]-[id number] (ENZO FORTE) [id number] mg-mg-mcg-mg cap Take 1 Cap by mouth daily. 30 Cap 0    pantoprazole (PROTONIX) 20 mg tablet Take 1 Tab by mouth Daily (before breakfast). 27 Tab 0     No Known Allergies  Family History   Problem Relation Age of Onset    Cancer Mother     Diabetes Mother     Cancer Father      Social History     Tobacco Use    Smoking status: Current Every Day Smoker     Packs/day: 0.50     Years: 45.00     Pack years: 22.50    Smokeless tobacco: Never Used   Substance Use Topics    Alcohol use: Yes     Comment: 3-4 16 oz beers daily     Patient Active Problem List   Diagnosis Code    Syncope R55    Anemia D64.9    Acute blood loss anemia D62       Depression Risk Factor Screening:     3 most recent PHQ Screens 8/27/2019   Little interest or pleasure in doing things Not at all   Feeling down, depressed, irritable, or hopeless Not at all   Total Score PHQ 2 0     Alcohol Risk Factor Screening: You do not drink alcohol or very rarely. Functional Ability and Level of Safety:   Hearing Loss  Hearing is good. Activities of Daily Living  The home contains: no safety equipment.  and has a cane and walker  Patient does total self care    Fall Risk  Fall Risk Assessment, last 12 mths 8/27/2019   Able to walk? Yes   Fall in past 12 months? Yes   Fall with injury? Yes   Number of falls in past 12 months 6   Fall Risk Score 7       Abuse Screen  Patient is not abused    Cognitive Screening   Evaluation of Cognitive Function:  Has your family/caregiver stated any concerns about your memory: no  Normal, Clock Drawing test      Syncope  Onset: about 3 years ago since he had a SAH  Charactelristics: has been hospitalized and had workup, found to be severly anemic, avm in colon  Seeing oncology Dr. Billy Richardson, getting iron transfusion, has passed out one time since then last week. He had no symptoms prior to falling, just wakes up on floor. Has been having nose bleeds, went to ED on 8/6 and 8/7: was intoxicated both times. Has not had nose bleed since then. Stools are brown. No nausea or vomiting. No blood in urine  Patient Care Team   Patient Care Team:  Magaly Yoon NP as PCP - General (Nurse Practitioner)    Assessment/Plan   Education and counseling provided:  Are appropriate based on today's review and evaluation  End-of-Life planning (with patient's consent)  Influenza Vaccine    Diagnoses and all orders for this visit:    1. Medicare annual wellness visit, subsequent  Medicare annual wellness visit completed today  Appropriate screenings were ordered and completed  2. Encounter for immunization  -     INFLUENZA VACCINE INACTIVATED (IIV), SUBUNIT, ADJUVANTED, IM    3. Syncope, unspecified syncope type  In April 2019 he had a syncopal episode went to the emergency room and was given 2 to 3 units of packed red blood cells and then sent home  On 6/13/2019 he had a syncopal episode subsequently he was hospitalized where colonoscopy showed that he had an AVM. 4. Anemia, unspecified type  -     Iron BisGl &PS [id number]-[id number]-[id number]-Ca (Quillian Crooked) 165-02-96-8 mg-mg-mcg-mg cap; Take 1 Cap by mouth daily.   - pantoprazole (PROTONIX) 20 mg tablet; Take 1 Tab by mouth Daily (before breakfast). 5. Acute blood loss anemia  On recommendation gastroenterology he is going to receive daily iron infusions until his ferritin is greater than 100 and hemoglobin is normal, he is also not to have any NSAIDs    6. Screening for alcoholism  -     DC ANNUAL ALCOHOL SCREEN 15 MIN    7. Screening for depression  -     DEPRESSION SCREEN ANNUAL    8. Need for hepatitis C screening test  -     HEPATITIS C AB; Future    9. Screening for cardiovascular condition  -     US EXAM SCREENING AAA; Future  -     LIPID PANEL; Future  He meets the risks factors qualify for an abdominal aortic aneurysm screening: He is older than 72, he is a male, smoking history of 22 pack years    8. Personal history of tobacco use, presenting hazards to health  We had a less than 10-minute discussion about smoking cessation. He is not interested in quitting at this time. Health Maintenance Due   Topic Date Due    DTaP/Tdap/Td series (1 - Tdap) 12/30/1972    Shingrix Vaccine Age 50> (1 of 2) 12/30/2001    FOBT Q 1 YEAR AGE 50-75  12/30/2001    GLAUCOMA SCREENING Q2Y  12/30/2016    AAA Screening 73-69 YO Male Smoking Patients  12/30/2016    Pneumococcal 65+ years (1 of 2 - PCV13) 12/30/2016    MEDICARE YEARLY EXAM  03/14/2018       An After Visit Summary was printed and given to the patient. All diagnosis have been discussed with the patient and all of the patient's questions have been answered. Follow-up and Dispositions    · Return in about 3 months (around 11/27/2019) for syncope, anemia, , 30 minutes. Nehemiah Lee, [place] [id number] [address] [place].   Ibis Moore

## 2019-08-27 NOTE — ACP (ADVANCE CARE PLANNING)
Advance Care Planning (ACP) Provider Conversation Snapshot    Date of ACP Conversation: 08/27/19  Persons included in Conversation:  patient  Length of ACP Conversation in minutes:  <16 minutes (Non-Billable)    Authorized Decision Maker (if patient is incapable of making informed decisions): This person is: Caryn Solis, [address] Agent/Medical Power of  under Advance Directive            For Patients with Decision Making Capacity:   Values/Goals: Exploration of values, goals, and preferences if recovery is not expected, even with continued medical treatment in the event of:  Imminent death  Severe, permanent brain injury  \"In these circumstances, what matters most to you? \"  Care focused more on comfort or quality of life.     Conversation Outcomes / Follow-Up Plan:   Recommended completion of Advance Directive form after review of ACP materials and conversation with prospective healthcare agent

## 2019-08-27 NOTE — PATIENT INSTRUCTIONS
Vaccine Information Statement    Influenza (Flu) Vaccine (Inactivated or Recombinant): What You Need to Know    Many Vaccine Information Statements are available in [place]ian and other languages. See www.immunize.org/vis  Hojas de información sobre vacunas están disponibles en español y en muchos otros idiomas. Visite www.immunize.org/vis    1. Why get vaccinated? Influenza vaccine can prevent influenza (flu). Flu is a contagious disease that spreads around the [facility] every year, usually between October and May. Anyone can get the flu, but it is more dangerous for some people. Infants and young children, people 72years of age and older, pregnant women, and people with certain health conditions or a weakened immune system are at greatest risk of flu complications. Pneumonia, bronchitis, sinus infections and ear infections are examples of flu-related complications. If you have a medical condition, such as heart disease, cancer or diabetes, flu can make it worse. Flu can cause fever and chills, sore throat, muscle aches, fatigue, cough, headache, and runny or stuffy nose. Some people may have vomiting and diarrhea, though this is more common in children than adults. Each year thousands of people in the [facility] die from flu, and many more are hospitalized. Flu vaccine prevents millions of illnesses and flu-related visits to the doctor each year. 2. Influenza vaccines     CDC recommends everyone 10months of age and older get vaccinated every flu season. Children 6 months through 6years of age may need 2 doses during a single flu season. Everyone else needs only 1 dose each flu season. It takes about 2 weeks for protection to develop after vaccination. There are many flu viruses, and they are always changing. Each year a new flu vaccine is made to protect against three or four viruses that are likely to cause disease in the upcoming flu season.  Even when the vaccine doesnt exactly match these viruses, it may still provide some protection. Influenza vaccine does not cause flu. Influenza vaccine may be given at the same time as other vaccines. 3. Talk with your health care provider    Tell your vaccine provider if the person getting the vaccine:   Has had an allergic reaction after a previous dose of influenza vaccine, or has any severe, life-threatening allergies.  Has ever had Guillain-Barré Syndrome (also called GBS). In some cases, your health care provider may decide to postpone influenza vaccination to a future visit. People with minor illnesses, such as a cold, may be vaccinated. People who are moderately or severely ill should usually wait until they recover before getting influenza vaccine. Your health care provider can give you more information. 4. Risks of a reaction     Soreness, redness, and swelling where shot is given, fever, muscle aches, and headache can happen after influenza vaccine.  There may be a very small increased risk of Guillain-Barré Syndrome (GBS) after inactivated influenza vaccine (the flu shot). Hector Mathews children who get the flu shot along with pneumococcal vaccine (PCV13), and/or DTaP vaccine at the same time might be slightly more likely to have a seizure caused by fever. Tell your health care provider if a child who is getting flu vaccine has ever had a seizure. People sometimes faint after medical procedures, including vaccination. Tell your provider if you feel dizzy or have vision changes or ringing in the ears. As with any medicine, there is a very remote chance of a vaccine causing a severe allergic reaction, other serious injury, or death. 5. What if there is a serious problem? An allergic reaction could occur after the vaccinated person leaves the clinic.  If you see signs of a severe allergic reaction (hives, swelling of the face and throat, difficulty breathing, a fast heartbeat, dizziness, or weakness), call 9-1-1 and get the person to the nearest hospital.    For other signs that concern you, call your health care provider. Adverse reactions should be reported to the Vaccine Adverse Event Reporting System (VAERS). Your health care provider will usually file this report, or you can do it yourself. Visit the VAERS website at www.vaers. hhs.gov or call 6-630.725.1883. VAERS is only for reporting reactions, and VAERS staff do not give medical advice. 6. The National Vaccine Injury Compensation Program    The [facility] Vaccine Injury Compensation Program (VICP) is a federal program that was created to compensate people who may have been injured by certain vaccines. Visit the VICP website at www.hrsa.gov/vaccinecompensation or call 5-494.648.5496 to learn about the program and about filing a claim. There is a time limit to file a claim for compensation. 7. How can I learn more?  Ask your health care provider.  Call your local or state health department.  Contact the Centers for Disease Control and Prevention (CDC):  - Call 5-888.702.8808 (5-580-PQF-INFO) or  - Visit CDCs influenza website at www.cdc.gov/flu    Vaccine Information Statement (Interim)  Inactivated Influenza Vaccine   8/15/2019  42 SULEMAN Rameshon Sally [id number]   Department of Health and Human Services  Centers for Disease Control and Prevention    Office Use Only      Medicare Wellness Visit, Male    The best way to live healthy is to have a lifestyle where you eat a well-balanced diet, exercise regularly, limit alcohol use, and quit all forms of tobacco/nicotine, if applicable. Regular preventive services are another way to keep healthy. Preventive services (vaccines, screening tests, monitoring & exams) can help personalize your care plan, which helps you manage your own care. Screening tests can find health problems at the earliest stages, when they are easiest to treat.    [facility] follows the current, evidence-based guidelines published by the [facility] (USPSTF) when recommending preventive services for our patients. Because we follow these guidelines, sometimes recommendations change over time as research supports it. (For example, a prostate screening blood test is no longer routinely recommended for men with no symptoms.)  Of course, you and your doctor may decide to screen more often for some diseases, based on your risk and co-morbidities (chronic disease you are already diagnosed with). Preventive services for you include:  - Medicare offers their members a free annual wellness visit, which is time for you and your primary care provider to discuss and plan for your preventive service needs. Take advantage of this benefit every year!  -All adults over age 72 should receive the recommended pneumonia vaccines. Current USPSTF guidelines recommend a series of two vaccines for the best pneumonia protection.   -All adults should have a flu vaccine yearly and an ECG. All adults age 61 and older should receive a shingles vaccine once in their lifetime.    -All adults age 38-68 who are overweight should have a diabetes screening test once every three years.   -Other screening tests & preventive services for persons with diabetes include: an eye exam to screen for diabetic retinopathy, a kidney function test, a foot exam, and stricter control over your cholesterol.   -Cardiovascular screening for adults with routine risk involves an electrocardiogram (ECG) at intervals determined by the provider.   -Colorectal cancer screening should be done for adults age 54-65 with no increased risk factors for colorectal cancer. There are a number of acceptable methods of screening for this type of cancer. Each test has its own benefits and drawbacks. Discuss with your provider what is most appropriate for you during your annual wellness visit.  The different tests include: colonoscopy (considered the best screening method), a fecal occult blood test, a fecal DNA test, and sigmoidoscopy.  -All adults born between [facility] should be screened once for Hepatitis C.  -An Abdominal Aortic Aneurysm (AAA) Screening is recommended for men age 73-68 who has ever smoked in their lifetime.      Here is a list of your current Health Maintenance items (your personalized list of preventive services) with a due date:  Health Maintenance Due   Topic Date Due    Hepatitis C Test  1950    DTaP/Tdap/Td  (1 - Tdap) 12/30/1972    Shingles Vaccine (1 of 2) 12/30/2001    Stool testing for trace blood  12/30/2001    Glaucoma Screening   12/30/2016    AAA Screening  12/30/2016    Pneumococcal Vaccine (1 of 2 - PCV13) 12/30/2016    Annual Well Visit  03/14/2018    Flu Vaccine  08/01/2019

## 2019-08-27 NOTE — PROGRESS NOTES
Anay Poe is a 80 y.o. male (: 1950) presenting to address:    Chief Complaint   Patient presents with    Establish Care    Anemia     management       Vitals:    19 1457   BP: 138/88   Pulse: 99   Resp: 16   Temp: 97.3 °F (36.3 °C)   TempSrc: Oral   SpO2: 100%   Weight: 196 lb (88.9 kg)   Height: 5' 9\" (1.753 m)   PainSc:   0 - No pain       Hearing/Vision:   No exam data present    Learning Assessment:     Learning Assessment 2019   PRIMARY LEARNER Patient   HIGHEST LEVEL OF EDUCATION - PRIMARY LEARNER  -   PRIMARY LANGUAGE ENGLISH   LEARNER PREFERENCE PRIMARY PICTURES     LISTENING     VIDEOS     DEMONSTRATION     READING   ANSWERED BY patient   RELATIONSHIP SELF     Depression Screening:     3 most recent PHQ Screens 2019   Little interest or pleasure in doing things Not at all   Feeling down, depressed, irritable, or hopeless Not at all   Total Score PHQ 2 0     Fall Risk Assessment:     Fall Risk Assessment, last 12 mths 2019   Able to walk? Yes   Fall in past 12 months? Yes   Fall with injury? Yes   Number of falls in past 12 months 6   Fall Risk Score 7     Abuse Screening:     Abuse Screening Questionnaire 2019   Do you ever feel afraid of your partner? N   Are you in a relationship with someone who physically or mentally threatens you? N   Is it safe for you to go home? Y     Coordination of Care Questionaire:   1. Have you been to the ER, urgent care clinic since your last visit? Hospitalized since your last visit? NO    2. Have you seen or consulted any other health care providers outside of the [address] [place] [address] since your last visit? Include any pap smears or colon screening. NO    Flu Vaccine given on the Left Deltoid, IM    Immunization/s administered 2019 by Maico Mora LPN with consent. Patient tolerated the procedure well.   No reactions noted.    '

## 2019-08-28 LAB
CHOLEST SERPL-MCNC: 179 MG/DL
HCV AB SER IA-ACNC: 0.09 INDEX
HCV AB SERPL QL IA: NEGATIVE
HCV COMMENT,HCGAC: NORMAL
HDLC SERPL-MCNC: 56 MG/DL (ref 40–60)
HDLC SERPL: 3.2 {RATIO} (ref 0–5)
LDLC SERPL CALC-MCNC: 103.2 MG/DL (ref 0–100)
LIPID PROFILE,FLP: ABNORMAL
TRIGL SERPL-MCNC: 99 MG/DL (ref ?–150)
VLDLC SERPL CALC-MCNC: 19.8 MG/DL

## 2019-08-30 ENCOUNTER — HOSPITAL ENCOUNTER (OUTPATIENT)
Dept: INFUSION THERAPY | Age: 69
Discharge: HOME | End: 2019-08-30
Payer: MEDICARE

## 2019-08-30 VITALS
DIASTOLIC BLOOD PRESSURE: 62 MMHG | TEMPERATURE: 97.1 F | SYSTOLIC BLOOD PRESSURE: 104 MMHG | OXYGEN SATURATION: 98 % | HEART RATE: 67 BPM

## 2019-08-30 DIAGNOSIS — D64.9 ANEMIA, UNSPECIFIED TYPE: ICD-10-CM

## 2019-08-30 LAB
BASOPHILS # BLD: 0 K/UL (ref 0–0.1)
BASOPHILS NFR BLD: 1 % (ref 0–2)
DIFFERENTIAL METHOD BLD: ABNORMAL
EOSINOPHIL # BLD: 0.1 K/UL (ref 0–0.4)
EOSINOPHIL NFR BLD: 3 % (ref 0–5)
ERYTHROCYTE [DISTWIDTH] IN BLOOD BY AUTOMATED COUNT: 18.6 % (ref 11.6–14.5)
FERRITIN SERPL-MCNC: 17 NG/ML (ref 8–388)
HCT VFR BLD AUTO: 35.1 % (ref 36–48)
HGB BLD-MCNC: 11.1 G/DL (ref 13–16)
IRON SATN MFR SERPL: 8 %
IRON SERPL-MCNC: 24 UG/DL (ref 50–175)
LYMPHOCYTES # BLD: 1 K/UL (ref 0.9–3.6)
LYMPHOCYTES NFR BLD: 26 % (ref 21–52)
MCH RBC QN AUTO: 26.6 PG (ref 24–34)
MCHC RBC AUTO-ENTMCNC: 31.6 G/DL (ref 31–37)
MCV RBC AUTO: 84.2 FL (ref 74–97)
MONOCYTES # BLD: 0.8 K/UL (ref 0.05–1.2)
MONOCYTES NFR BLD: 20 % (ref 3–10)
NEUTS SEG # BLD: 2.1 K/UL (ref 1.8–8)
NEUTS SEG NFR BLD: 50 % (ref 40–73)
PLATELET # BLD AUTO: 151 K/UL (ref 135–420)
RBC # BLD AUTO: 4.17 M/UL (ref 4.7–5.5)
TIBC SERPL-MCNC: 318 UG/DL (ref 250–450)
WBC # BLD AUTO: 4.1 K/UL (ref 4.6–13.2)

## 2019-08-30 PROCEDURE — 83540 ASSAY OF IRON: CPT

## 2019-08-30 PROCEDURE — 36415 COLL VENOUS BLD VENIPUNCTURE: CPT

## 2019-08-30 PROCEDURE — 85025 COMPLETE CBC W/AUTO DIFF WBC: CPT

## 2019-08-30 PROCEDURE — 82728 ASSAY OF FERRITIN: CPT

## 2019-08-30 NOTE — PROGRESS NOTES
[facility] Progress Note    Date: 2019    Name: Jessie Frey    MRN: [id number]         : 1950    Peripheral Lab Draw      Mr. Sonya Perkins to [facility], ambulatory at 1050 accompanied by self. Pt was assessed and education was provided. Mr. Arndt's vitals were reviewed and patient was observed for 5 minutes prior to treatment. Visit Vitals  /62 (BP 1 Location: Left arm, BP Patient Position: Sitting)   Pulse 67   Temp 97.1 °F (36.2 °C) (Oral)   SpO2 98%     Recent Results (from the past 12 hour(s))   CBC WITH AUTOMATED DIFF    Collection Time: 19 10:54 AM   Result Value Ref Range    WBC 4.1 (L) 4.6 - 13.2 K/uL    RBC 4.17 (L) 4.70 - 5.50 M/uL    HGB 11.1 (L) 13.0 - 16.0 g/dL    HCT 35.1 (L) 36.0 - 48.0 %    MCV 84.2 74.0 - 97.0 FL    MCH 26.6 24.0 - 34.0 PG    MCHC 31.6 31.0 - 37.0 g/dL    RDW 18.6 (H) 11.6 - 14.5 %    PLATELET 575 828 - 479 K/uL    NEUTROPHILS 50 40 - 73 %    LYMPHOCYTES 26 21 - 52 %    MONOCYTES 20 (H) 3 - 10 %    EOSINOPHILS 3 0 - 5 %    BASOPHILS 1 0 - 2 %    ABS. NEUTROPHILS 2.1 1.8 - 8.0 K/UL    ABS. LYMPHOCYTES 1.0 0.9 - 3.6 K/UL    ABS. MONOCYTES 0.8 0.05 - 1.2 K/UL    ABS. EOSINOPHILS 0.1 0.0 - 0.4 K/UL    ABS. BASOPHILS 0.0 0.0 - 0.1 K/UL    DF AUTOMATED         Blood obtained peripherally from left arm x 1 attempt with butterfly needle and sent to lab for Cbc w/diff, Iron Profile and Ferritin per written orders. No bleeding or hematoma noted at site. Gauze and coban applied. Mr. Sonya Perkins tolerated the phlebotomy, and had no complaints. Patient armband removed and shredded. Mr. Sonya Perkins was discharged from [place] [address] in stable condition at 1100.      [facility] Phlebotomist PCT  2019  1:46 PM

## 2019-09-04 ENCOUNTER — OFFICE VISIT (OUTPATIENT)
Dept: ONCOLOGY | Age: 69
End: 2019-09-04

## 2019-09-04 VITALS
RESPIRATION RATE: 18 BRPM | DIASTOLIC BLOOD PRESSURE: 80 MMHG | TEMPERATURE: 98.4 F | OXYGEN SATURATION: 99 % | WEIGHT: 199 LBS | HEART RATE: 89 BPM | BODY MASS INDEX: 29.39 KG/M2 | SYSTOLIC BLOOD PRESSURE: 125 MMHG

## 2019-09-04 DIAGNOSIS — D62 ACUTE BLOOD LOSS ANEMIA: Primary | ICD-10-CM

## 2019-09-04 NOTE — PROGRESS NOTES
Marciano Gross is a 80 y.o. male presenting today for a follow-up appointment. Patient is ambulatory with no assistive devices and reports 10/10 pain in his right hip. Patient was stumbling walking back to exam room and states \"I had a beer this morning because I was nervous\". Physician notified. Patient left clinic abruptly without being seen by the physician. Chief Complaint   Patient presents with    Anemia    Thrombocytopenia       Visit Vitals  /80   Pulse 89   Temp 98.4 °F (36.9 °C) (Oral)   Resp 18   Wt 199 lb (90.3 kg)   SpO2 99%   BMI 29.39 kg/m²       Current Outpatient Medications   Medication Sig    Iron BisGl &PS [id number]-[id number]-FA-Ca (ENZO FORTE) 405-17-62-3 mg-mg-mcg-mg cap Take 1 Cap by mouth daily.  pantoprazole (PROTONIX) 20 mg tablet Take 1 Tab by mouth Daily (before breakfast). No current facility-administered medications for this visit. Fall Risk Assessment, last 12 mths 8/27/2019   Able to walk? Yes   Fall in past 12 months? Yes   Fall with injury? Yes   Number of falls in past 12 months 6   Fall Risk Score 7       3 most recent PHQ Screens 8/27/2019   Little interest or pleasure in doing things Not at all   Feeling down, depressed, irritable, or hopeless Not at all   Total Score PHQ 2 0       Abuse Screening Questionnaire 8/27/2019   Do you ever feel afraid of your partner? N   Are you in a relationship with someone who physically or mentally threatens you? N   Is it safe for you to go home?  Windy Weatherly

## 2019-11-27 ENCOUNTER — HOSPITAL ENCOUNTER (OUTPATIENT)
Dept: LAB | Age: 69
Discharge: HOME | End: 2019-11-27
Payer: MEDICAID

## 2019-11-27 ENCOUNTER — OFFICE VISIT (OUTPATIENT)
Dept: FAMILY MEDICINE CLINIC | Age: 69
End: 2019-11-27

## 2019-11-27 VITALS
BODY MASS INDEX: 30.21 KG/M2 | HEART RATE: 113 BPM | TEMPERATURE: 97.6 F | DIASTOLIC BLOOD PRESSURE: 72 MMHG | SYSTOLIC BLOOD PRESSURE: 138 MMHG | RESPIRATION RATE: 16 BRPM | HEIGHT: 69 IN | WEIGHT: 204 LBS | OXYGEN SATURATION: 98 %

## 2019-11-27 DIAGNOSIS — D64.9 ANEMIA, UNSPECIFIED TYPE: ICD-10-CM

## 2019-11-27 DIAGNOSIS — R55 SYNCOPE, UNSPECIFIED SYNCOPE TYPE: Primary | ICD-10-CM

## 2019-11-27 DIAGNOSIS — I49.9 IRREGULAR HEART RHYTHM: ICD-10-CM

## 2019-11-27 DIAGNOSIS — I49.3 PVC (PREMATURE VENTRICULAR CONTRACTION): ICD-10-CM

## 2019-11-27 DIAGNOSIS — R55 SYNCOPE, UNSPECIFIED SYNCOPE TYPE: ICD-10-CM

## 2019-11-27 LAB
ALBUMIN SERPL-MCNC: 3.7 G/DL (ref 3.4–5)
ALBUMIN/GLOB SERPL: 0.8 {RATIO} (ref 0.8–1.7)
ALP SERPL-CCNC: 82 U/L (ref 45–117)
ALT SERPL-CCNC: 25 U/L (ref 16–61)
ANION GAP SERPL CALC-SCNC: 4 MMOL/L (ref 3–18)
AST SERPL-CCNC: 28 U/L (ref 10–38)
BILIRUB SERPL-MCNC: 0.5 MG/DL (ref 0.2–1)
BUN SERPL-MCNC: 12 MG/DL (ref 7–18)
BUN/CREAT SERPL: 12 (ref 12–20)
CALCIUM SERPL-MCNC: 9.5 MG/DL (ref 8.5–10.1)
CHLORIDE SERPL-SCNC: 107 MMOL/L (ref 100–111)
CO2 SERPL-SCNC: 27 MMOL/L (ref 21–32)
CREAT SERPL-MCNC: 1.04 MG/DL (ref 0.6–1.3)
ERYTHROCYTE [DISTWIDTH] IN BLOOD BY AUTOMATED COUNT: 19.5 % (ref 11.6–14.5)
GLOBULIN SER CALC-MCNC: 4.7 G/DL (ref 2–4)
GLUCOSE SERPL-MCNC: 97 MG/DL (ref 74–99)
HCT VFR BLD AUTO: 33.4 % (ref 36–48)
HGB BLD-MCNC: 9.8 G/DL (ref 13–16)
MCH RBC QN AUTO: 24.9 PG (ref 24–34)
MCHC RBC AUTO-ENTMCNC: 29.3 G/DL (ref 31–37)
MCV RBC AUTO: 85 FL (ref 74–97)
PLATELET # BLD AUTO: 285 K/UL (ref 135–420)
PMV BLD AUTO: 10.1 FL (ref 9.2–11.8)
POTASSIUM SERPL-SCNC: 3.9 MMOL/L (ref 3.5–5.5)
PROT SERPL-MCNC: 8.4 G/DL (ref 6.4–8.2)
RBC # BLD AUTO: 3.93 M/UL (ref 4.7–5.5)
SODIUM SERPL-SCNC: 138 MMOL/L (ref 136–145)
WBC # BLD AUTO: 4.3 K/UL (ref 4.6–13.2)

## 2019-11-27 PROCEDURE — 36415 COLL VENOUS BLD VENIPUNCTURE: CPT

## 2019-11-27 PROCEDURE — 85027 COMPLETE CBC AUTOMATED: CPT

## 2019-11-27 PROCEDURE — 80053 COMPREHEN METABOLIC PANEL: CPT

## 2019-11-27 NOTE — PROGRESS NOTES
Chief Complaint   Patient presents with    Anemia    Dizziness     syncope        1. Have you been to the ER, urgent care clinic since your last visit? Hospitalized since your last visit? NO    2. Have you seen or consulted any other health care providers outside of the [facility] since your last visit? Include any pap smears or colon screening.  No

## 2019-11-27 NOTE — PROGRESS NOTES
[address] [place] [address]               [phone]      Jennifer Abrams is a 80 y.o. male and presents with Anemia and Dizziness (syncope )       Assessment/Plan:    Diagnoses and all orders for this visit:    1. Syncope, unspecified syncope type  -     CBC W/O DIFF; Future  -     REFERRAL TO NEUROLOGY  -     CARDIAC HOLTER MONITOR; Future  His syncope could be related to seizures given the fact per his report they started after a head injury and 2015  Will refer to neurology for further evaluation  In addition he could be having cardiac arrhythmias, on physical exam he was noted to have an irregular heart rate, twelve-lead EKG was performed and reviewed by me which revealed a sinus rhythm with frequent unifocal PVCs  We will obtain a Holter monitor    2. Anemia, unspecified type  -     CBC W/O DIFF; Future  -     METABOLIC PANEL, COMPREHENSIVE; Future  Follow-up on his anemia  Routine labs ordered    3. Irregular heart rhythm  -     AMB POC EKG ROUTINE W/ 12 LEADS, INTER & REP  -     METABOLIC PANEL, COMPREHENSIVE; Future  -     CARDIAC HOLTER MONITOR; Future  Irregular cardiac rhythm noted on physical examination, twelve-lead EKG obtained and will send for a Holter monitor  orthostatic blood pressures were obtained and were negative    This note was dictated using Dragon dictation system. Every effort was made to ensure accuracy, although occasional spelling errors and word substitutions are expected due to dictation system limitations. Thank you for your understanding and patience. Follow-up and Dispositions    · Return in about 4 weeks (around 12/25/2019) for syncope, holter monitor, 30 minutes. Subjective:  Syncope  Patient complains of loss of consciousness. Onset was 4 years ago, with worsening course since that time.  Patient describes the episode as sudden loss of consciousness without warning, lost consciousness for 15 hours, (this episode he did not find out about the fall until the next day when a friend informed him he found him on the ground and helped him home) syncope occurred in setting of normal non-stressful ADLs and some episodes have been witnessed and the following was observed: sudden loss of consciousness without abnormal movements and mental status was normal immediately upon regaining consciousness. Patient also has associated symptoms of  none. The patient denies no associated neurologic, cardiac or gastrointestinal symptoms. Taking culprit meds?: No suspect medications    2nd time he was in the house, was getting up to go to the bathroom and next thing he knows he tore up his night stand, when he came to he was sitting on his bottom, no incontinence, no warning symptoms. Never had a history of seizures,     In 2015 fell down steps with head injury, admitted to [facility], after that is when the passing out episodes started. Unable to find any record of this admission in his chart or and care everywhere. In June of this year he was admitted after syncopal episode. At that time his hemoglobin was found to be 3.5 he received several units of packed RBCs. He underwent a colonoscopy which was negative for any source of the bleed although a polyp was found and removed with cold snare. During that admission he also had a EGD where they found several AVMs in the third and fourth portion of the duodenum along with a gastric ulcer. At this time he denies any black or bloody stools or coffee-ground emesis. Since that admission there are a couple of ED visits for epistaxis on both of the occasions he was intoxicated. He denies being intoxicated at the time of the fall where he was out all night long and tell 7:30 in the morning. And stated to me that he has not been drinking \"that much\". ROS: As stated in HPI, otherwise all others negative. ROS    The problem list was updated as a part of today's visit.   Patient Active Problem List   Diagnosis Code    Syncope R55    Anemia D64.9    Acute blood loss anemia D62       The PSH, FH were reviewed. SH:  Social History     Tobacco Use    Smoking status: Current Every Day Smoker     Packs/day: 0.50     Years: 45.00     Pack years: 22.50    Smokeless tobacco: Never Used   Substance Use Topics    Alcohol use: Yes     Comment: 3-4 16 oz beers daily    Drug use: No       Medications/Allergies:  Current Outpatient Medications on File Prior to Visit   Medication Sig Dispense Refill    Iron BisGl &PS [id number] (Jhon Humphrey) 915-68-42-7 mg-mg-mcg-mg cap Take 1 Cap by mouth daily. 30 Cap 0    pantoprazole (PROTONIX) 20 mg tablet Take 1 Tab by mouth Daily (before breakfast). 30 Tab 0     No current facility-administered medications on file prior to visit. No Known Allergies    Objective:  Visit Vitals  /72   Pulse (!) 113   Temp 97.6 °F (36.4 °C) (Oral)   Resp 16   Ht 5' 9\" (1.753 m)   Wt 204 lb (92.5 kg)   SpO2 98%   BMI 30.13 kg/m²    Body mass index is 30.13 kg/m². Physical assessment  Physical Exam  Vitals signs and nursing note reviewed. Constitutional:       Appearance: Normal appearance. He is obese. Cardiovascular:      Rate and Rhythm: Normal rate. Rhythm irregular. Heart sounds: Normal heart sounds, S1 normal and S2 normal. No murmur. Pulmonary:      Effort: Pulmonary effort is normal.      Breath sounds: Normal breath sounds. Musculoskeletal:      Comments: Walking with a limp   Skin:     General: Skin is warm and dry. Neurological:      Mental Status: He is alert and oriented to person, place, and time. GCS: GCS eye subscore is 4. GCS verbal subscore is 5. GCS motor subscore is 6. Cranial Nerves: Cranial nerves are intact. Sensory: Sensation is intact. Motor: Motor function is intact. Coordination: Coordination is intact. Romberg sign negative. Gait: Gait is intact.  Tandem walk normal.      Comments: Orthostatic Blood Pressure:     lying 115/67, Pulse 101  sitting 105/64, Pulse 83  standing 117/69, Pulse 105   Psychiatric:         Attention and Perception: Attention and perception normal.         Mood and Affect: Mood and affect normal.         Speech: Speech normal.         Behavior: Behavior normal. Behavior is cooperative. Thought Content:  Thought content normal.         Cognition and Memory: Cognition and memory normal.         Judgment: Judgment normal.           Labwork and Ancillary Studies:    CBC w/Diff  Lab Results   Component Value Date/Time    WBC 4.3 (L) 11/27/2019 03:18 PM    HGB 9.8 (L) 11/27/2019 03:18 PM    PLATELET 219 36/34/0658 03:18 PM         Basic Metabolic Profile  Lab Results   Component Value Date/Time    Sodium 138 11/27/2019 03:18 PM    Potassium 3.9 11/27/2019 03:18 PM    Chloride 107 11/27/2019 03:18 PM    CO2 27 11/27/2019 03:18 PM    Anion gap 4 11/27/2019 03:18 PM    Glucose 97 11/27/2019 03:18 PM    BUN 12 11/27/2019 03:18 PM    Creatinine 1.04 11/27/2019 03:18 PM    BUN/Creatinine ratio 12 11/27/2019 03:18 PM    GFR est AA >60 11/27/2019 03:18 PM    GFR est non-AA >60 11/27/2019 03:18 PM    Calcium 9.5 11/27/2019 03:18 PM        Cholesterol  Lab Results   Component Value Date/Time    Cholesterol, total 179 08/27/2019 03:53 PM    HDL Cholesterol 56 08/27/2019 03:53 PM    LDL, calculated 103.2 (H) 08/27/2019 03:53 PM    Triglyceride 99 08/27/2019 03:53 PM    CHOL/HDL Ratio 3.2 08/27/2019 03:53 PM       Health Maintenance:   Health Maintenance   Topic Date Due    DTaP/Tdap/Td series (1 - Tdap) 12/30/1962    Shingrix Vaccine Age 50> (1 of 2) 12/30/2001    FOBT Q 1 YEAR AGE 50-75  12/30/2001    GLAUCOMA SCREENING Q2Y  12/30/2016    AAA Screening 73-67 YO Male Smoking Patients  12/30/2016    Pneumococcal 65+ years (1 of 1 - PPSV23) 12/30/2016    MEDICARE YEARLY EXAM  08/27/2020    Hepatitis C Screening  Completed    Influenza Age 5 to Adult  Completed       I have discussed the diagnosis with the patient and the intended plan as seen in the above orders. The patient has received an After-Visit Summary and questions were answered concerning future plans. An After Visit Summary was printed and given to the patient. All diagnosis have been discussed with the patient and all of the patient's questions have been answered. Follow-up and Dispositions    · Return in about 4 weeks (around 12/25/2019) for syncope, holter monitor, 30 minutes. Polly Burris, AGNP-BC  [address] [address] [address] [facility] [address]  [id number]

## 2019-11-27 NOTE — PATIENT INSTRUCTIONS
Cardiac Arrhythmia: Care Instructions  Your Care Instructions    A cardiac arrhythmia is a change in the normal rhythm of the heart. Your heart may beat too fast or too slow or beat with an irregular or skipping rhythm. A change in the heart's rhythm may feel like a really strong heartbeat or a fluttering in your chest. A severe heart rhythm problem can keep the body from getting the blood it needs. This can result in shortness of breath, lightheadedness, and fainting. You may take medicine to treat your condition. Your doctor may recommend a pacemaker or recommend catheter ablation to destroy small parts of the heart that are causing a rhythm problem. Another possible treatment is an implantable cardioverter-defibrillator (ICD). An ICD is a device that gives the heart a shock to return the heart to a normal rhythm. Follow-up care is a key part of your treatment and safety. Be sure to make and go to all appointments, and call your doctor if you are having problems. It's also a good idea to know your test results and keep a list of the medicines you take. How can you care for yourself at home?  [facility]    · Be safe with medicines. Take your medicines exactly as prescribed. Call your doctor if you think you are having a problem with your medicine. You will get more details on the specific medicines your doctor prescribes.     · If you received a pacemaker or an ICD, you will get a fact sheet about it.     · Wear medical alert jewelry that says you have an abnormal heart rhythm. You can buy this at most drugstores.    Lifestyle changes    · Eat a heart-healthy diet.     · Stay at a healthy weight. Lose weight if you need to.     · Avoid nicotine, too much alcohol, and illegal drugs (meth, speed, and cocaine). Also, get enough sleep and do not overeat.     · Ask your doctor whether you can take over-the-counter medicines (such as decongestants).  These can make your heart beat fast.     · Talk to your doctor about any limits to activities, such as driving, or tasks where you use power tools or ladders. Activity    · Start light exercise if your doctor says you can. Even a small amount will help you get stronger, have more energy, and manage your stress.     · Get regular exercise. Try for 30 minutes on most days of the week. Ask your doctor what level of exercise is safe for you. If activity is not likely to cause health problems, you probably do not have limits on the type or level of activity that you can do. You may want to walk, swim, bike, or do other activities.     · When you exercise, watch for signs that your heart is working too hard. You are pushing too hard if you cannot talk while you exercise. If you become short of breath or dizzy or have chest pain, sit down and rest.     · Check your pulse daily. Place two fingers on the artery at the palm side of your wrist, in line with your thumb. If your heartbeat seems uneven, talk to your doctor. When should you call for help? Call 911 anytime you think you may need emergency care. For example, call if:    · You have symptoms of sudden heart failure. These may include:  ? Severe trouble breathing. ? A fast or irregular heartbeat. ? Coughing up pink, foamy mucus. ? You passed out.     · You have signs of a stroke. These include:  ? Sudden numbness, paralysis, or weakness in your face, arm, or leg, especially on only one side of your body. ? New problems with walking or balance. ? Sudden vision changes. ? Drooling or slurred speech. ? New problems speaking or understanding simple statements, or feeling confused. ? A sudden, severe headache that is different from past headaches.    Call your doctor now or seek immediate medical care if:    · You have new or changed symptoms of heart failure, such as:  ? New or increased shortness of breath. ? New or worse swelling in your legs, ankles, or feet.   ? Sudden weight gain, such as more than 2 to 3 pounds in a day or 5 pounds in a week. (Your doctor may suggest a different range of weight gain.)  ? Feeling dizzy or lightheaded or like you may faint. ? Feeling so tired or weak that you cannot do your usual activities. ? Not sleeping well. Shortness of breath wakes you at night. You need extra pillows to prop yourself up to breathe easier.    Watch closely for changes in your health, and be sure to contact your doctor if:    · You do not get better as expected. Where can you learn more? Go to http://shaquille-licha.info/. Enter R719 in the search box to learn more about \"Cardiac Arrhythmia: Care Instructions. \"  Current as of: April 9, 2019  Content Version: 12.2  © 8467-8993 [employer]. Care instructions adapted under license by [employer] (which disclaims liability or warranty for this information). If you have questions about a medical condition or this instruction, always ask your healthcare professional. [address] any warranty or liability for your use of this information.

## 2019-11-27 NOTE — PROGRESS NOTES
Orthostatic Blood Pressure:    lying 115/67, Pulse 101  sitting 105/64, Pulse 83  standing 117/69, Pulse 105

## 2019-12-03 ENCOUNTER — HOSPITAL ENCOUNTER (OUTPATIENT)
Dept: NON INVASIVE DIAGNOSTICS | Age: 69
Discharge: HOME | End: 2019-12-03
Attending: NURSE PRACTITIONER
Payer: MEDICARE

## 2019-12-03 DIAGNOSIS — R55 SYNCOPE, UNSPECIFIED SYNCOPE TYPE: ICD-10-CM

## 2019-12-03 DIAGNOSIS — I49.9 IRREGULAR HEART RHYTHM: ICD-10-CM

## 2019-12-03 PROCEDURE — 93226 XTRNL ECG REC<48 HR SCAN A/R: CPT

## 2019-12-19 ENCOUNTER — OFFICE VISIT (OUTPATIENT)
Dept: NEUROLOGY | Age: 69
End: 2019-12-19

## 2019-12-19 VITALS
HEIGHT: 69 IN | RESPIRATION RATE: 20 BRPM | DIASTOLIC BLOOD PRESSURE: 72 MMHG | SYSTOLIC BLOOD PRESSURE: 138 MMHG | WEIGHT: 207.2 LBS | BODY MASS INDEX: 30.69 KG/M2 | TEMPERATURE: 98.1 F | HEART RATE: 86 BPM | OXYGEN SATURATION: 97 %

## 2019-12-19 DIAGNOSIS — L60.0 IGTN (INGROWING TOE NAIL): ICD-10-CM

## 2019-12-19 DIAGNOSIS — R40.4 TRANSIENT ALTERATION OF AWARENESS: Primary | ICD-10-CM

## 2019-12-19 DIAGNOSIS — R55 SYNCOPE, UNSPECIFIED SYNCOPE TYPE: ICD-10-CM

## 2019-12-19 NOTE — PROGRESS NOTES
Marisol Johnson is a 80 y.o. male new patient in today to discuss syncope; as referred by Benjamin Mota NP. Learning assessment previously completed 8/27/2019; primary language is [place].

## 2019-12-19 NOTE — PROGRESS NOTES
Marciano Gross is a 80 y.o. male . presents for New Patient [place] PANCHITO Nieves) and Dizziness (syncope)   . Mr. Al Lepe is a 65years old male patient with past history of head trauma with subarachnoid hemorrhage in September 2016 came for evaluation of recurrent passing out spells. In September 2016, he fell back on his head after  stairs collapsed. He was unconscious for about 2 days. He was admitted to the hospital in [facility]. No detailed description of the work-ups found. But since then patient had recurrent episodes of loss of consciousness while he is walking or standing position. It never happened directly sitting or sleeping question. According to him he is usually comes suddenly without any warning and he falls to the floor/ground. He had some injuries to his head and extremities because of it. No mention of abnormal body movement. Denied having any tongue bite or incontinence. He said episode lasted for about 10 minutes. No marked postictal confusion. No postictal Monico's paralysis. He does not have any lightheadedness, spinning, palpitation, sweating, or difficulty breathing before the episode. Frequency of episodes is 1 every 2 months. Last episode was 2 weeks ago. He had an EEG in 2016 and [facility] which was negative for seizure. He had a repeat CT scan in 2018 which did not show any acute lesions. Recently, by his primary care physician altered monitoring was done for 2 days but result is still pending. Patient has no difficulty walking and denied having any weakness of his extremities. He does not feel off balance. No sensory symptoms. Denies any vision changes. No cognitive difficulties. He has pain over the right big toe and has a big toenail attached to skin. It is painful when touching it. No discharge from the sides. Review of Systems   Constitutional: Negative for chills, diaphoresis, fever, malaise/fatigue and weight loss.    HENT: Negative for hearing loss and tinnitus. Eyes: Positive for blurred vision (after cataract removal in Oct/2018). Negative for double vision. Respiratory: Negative for cough, shortness of breath and wheezing. Cardiovascular: Negative for chest pain, palpitations and leg swelling. Gastrointestinal: Negative for constipation, diarrhea, heartburn, nausea and vomiting. Genitourinary: Negative for dysuria, frequency, hematuria and urgency. Musculoskeletal: Positive for falls. Negative for back pain, myalgias and neck pain. Skin: Negative for itching and rash. Neurological: Positive for loss of consciousness. Negative for dizziness, tingling, tremors and headaches. Endo/Heme/Allergies: Does not bruise/bleed easily. Psychiatric/Behavioral: Negative for depression, memory loss and substance abuse. The patient does not have insomnia. Past Medical History:   Diagnosis Date    Eye inflamed     Ill-defined condition     Pt passes out  does not know why. happened 15 times gonzalez. Last time ; Aug 2018       Past Surgical History:   Procedure Laterality Date    COLONOSCOPY N/A 6/17/2019    COLONOSCOPY performed by Gabriella uDncan [facility] at [facility] ENDOSCOPY    HX CATARACT REMOVAL Left 01/24/2018        Family History   Problem Relation Age of Onset    Cancer Mother     Diabetes Mother     Cancer Father         Social History     Socioeconomic History    Marital status:      Spouse name: Not on file    Number of children: Not on file    Years of education: Not on file    Highest education level: Not on file   Occupational History    Not on file   Social Needs    Financial resource strain: Not on file    Food insecurity:     Worry: Not on file     Inability: Not on file    Transportation needs:     Medical: Not on file     Non-medical: Not on file   Tobacco Use    Smoking status: Current Every Day Smoker     Packs/day: 0.50     Years: 45.00     Pack years: 22.50    Smokeless tobacco: Never Used   Substance and Sexual Activity    Alcohol use: Yes     Comment: 3-4 16 oz beers daily    Drug use: No    Sexual activity: Not on file   Lifestyle    Physical activity:     Days per week: Not on file     Minutes per session: Not on file    Stress: Not on file   Relationships    Social connections:     Talks on phone: Not on file     Gets together: Not on file     Attends Christianity service: Not on file     Active member of club or organization: Not on file     Attends meetings of clubs or organizations: Not on file     Relationship status: Not on file    Intimate partner violence:     Fear of current or ex partner: Not on file     Emotionally abused: Not on file     Physically abused: Not on file     Forced sexual activity: Not on file   Other Topics Concern    Not on file   Social History Narrative    Not on file        No Known Allergies      Current Outpatient Medications   Medication Sig Dispense Refill    Iron BisGl &PS [id number] ([place] Russian) [id number] mg-mg-mcg-mg cap Take 1 Cap by mouth daily. 30 Cap 0    pantoprazole (PROTONIX) 20 mg tablet Take 1 Tab by mouth Daily (before breakfast). 30 Tab 0         Physical Exam  Constitutional:       General: He is not in acute distress. Appearance: Normal appearance. HENT:      Head: Normocephalic and atraumatic. Mouth/Throat:      Mouth: Mucous membranes are moist.      Pharynx: Oropharynx is clear. No oropharyngeal exudate. Eyes:      Extraocular Movements: Extraocular movements intact. Pupils: Pupils are equal, round, and reactive to light. Neck:      Musculoskeletal: Normal range of motion and neck supple. Cardiovascular:      Rate and Rhythm: Normal rate. Heart sounds: No murmur. No gallop. Pulmonary:      Effort: Pulmonary effort is normal. No respiratory distress. Breath sounds: Normal breath sounds. No wheezing or rales. Musculoskeletal:      Comments:  There is a big piece of toe nail attached to the skin on the right bog toe; it is tender to touch. No discharge. Neurological:      Comments: Mental status: Awake, alert, oriented x3, follows simple, complex and inverted commands, no neglect, no extinction to DSS or VSS. Speech and languge: fluent, coherent, naming and repitition intact, reading and comprehension intact  CN: VFF, EOMI, PERRLA, face sensation intact , no facial asymmetry noted, palate elevation symmetric bilat, SS+SCM 5/5 bilat, tongue midline  Motor: no pronator drift, tone normal throughout, strength 5/5 throughout  Sensory: intact to light touch throughout  Coordination: FNF dysmetria; some difficulty with HTS from pain. DTR: 2+ throughout, toes downgoing BL  Gait: antalgic(right foot pain).            Hospital Outpatient Visit on 11/27/2019   Component Date Value Ref Range Status    WBC 11/27/2019 4.3* 4.6 - 13.2 K/uL Final    RBC 11/27/2019 3.93* 4.70 - 5.50 M/uL Final    HGB 11/27/2019 9.8* 13.0 - 16.0 g/dL Final    HCT 11/27/2019 33.4* 36.0 - 48.0 % Final    MCV 11/27/2019 85.0  74.0 - 97.0 FL Final    MCH 11/27/2019 24.9  24.0 - 34.0 PG Final    MCHC 11/27/2019 29.3* 31.0 - 37.0 g/dL Final    RDW 11/27/2019 19.5* 11.6 - 14.5 % Final    PLATELET 48/93/6874 097  135 - 420 K/uL Final    MPV 11/27/2019 10.1  9.2 - 11.8 FL Final    Sodium 11/27/2019 138  136 - 145 mmol/L Final    Potassium 11/27/2019 3.9  3.5 - 5.5 mmol/L Final    Chloride 11/27/2019 107  100 - 111 mmol/L Final    CO2 11/27/2019 27  21 - 32 mmol/L Final    Anion gap 11/27/2019 4  3.0 - 18 mmol/L Final    Glucose 11/27/2019 97  74 - 99 mg/dL Final    BUN 11/27/2019 12  7.0 - 18 MG/DL Final    Creatinine 11/27/2019 1.04  0.6 - 1.3 MG/DL Final    BUN/Creatinine ratio 11/27/2019 12  12 - 20   Final    GFR est AA 11/27/2019 >60  >60 ml/min/1.73m2 Final    GFR est non-AA 11/27/2019 >60  >60 ml/min/1.73m2 Final    Comment: (NOTE)  Estimated GFR is calculated using the Modification of Diet in Renal   Disease (MDRD) Study equation, reported for both  Americans   (GFRAA) and non- Americans (GFRNA), and normalized to 1.73m2   body surface area. The physician must decide which value applies to   the patient. The MDRD study equation should only be used in   individuals age 25 or older. It has not been validated for the   following: pregnant women, patients with serious comorbid conditions,   or on certain medications, or persons with extremes of body size,   muscle mass, or nutritional status.  Calcium 11/27/2019 9.5  8.5 - 10.1 MG/DL Final    Bilirubin, total 11/27/2019 0.5  0.2 - 1.0 MG/DL Final    ALT (SGPT) 11/27/2019 25  16 - 61 U/L Final    AST (SGOT) 11/27/2019 28  10 - 38 U/L Final    Alk. phosphatase 11/27/2019 82  45 - 117 U/L Final    Protein, total 11/27/2019 8.4* 6.4 - 8.2 g/dL Final    Albumin 11/27/2019 3.7  3.4 - 5.0 g/dL Final    Globulin 11/27/2019 4.7* 2.0 - 4.0 g/dL Final    A-G Ratio 11/27/2019 0.8  0.8 - 1.7   Final             ICD-10-CM ICD-9-CM    1. Transient alteration of awareness R40.4 780.02 EEG   2. Syncope, unspecified syncope type R55 780.2    3. IGTN (ingrowing toe nail) L60.0 703.0 REFERRAL TO PODIATRY       A 89years old male patient with open medical problems came for evaluation of multiple passing out episodes. Episodes are in a standing or walking position; no episodes since eating or lying position. No warning symptoms. No postictal confusion. Possible syncopal events. But patient had a past history of subarachnoid hemorrhage after traumatic head injury. We need to rule out the possibility of his seizure. Previous scans are negative. Previous EEG in 2016 did not show any epileptiform discharges. Will repeat his EEG. We will also follow the Holter monitoring result. Patient has pain over the right big toe. Referral sent to podiatry. We will see patient in 3 months time but will contact him with the results of the EEG.

## 2019-12-20 NOTE — PROGRESS NOTES
Please let him know his Holter monitor test showed frequent PVCs, this is the same thing we saw on the EKG done in the office  Due to the frequency of the PVCs I would like to refer him to cardiology for further evaluation  Referral order entered

## 2019-12-23 NOTE — PROGRESS NOTES
Called pt and informed him of Holtor results per PANCHITO Valentine's note.  Also gave him referral information with Dr's name and address as well as his Number listed on referral. Thank you

## 2020-01-07 ENCOUNTER — OFFICE VISIT (OUTPATIENT)
Dept: CARDIOLOGY CLINIC | Age: 70
End: 2020-01-07

## 2020-01-07 VITALS
BODY MASS INDEX: 31.01 KG/M2 | WEIGHT: 210 LBS | SYSTOLIC BLOOD PRESSURE: 148 MMHG | DIASTOLIC BLOOD PRESSURE: 81 MMHG | HEART RATE: 88 BPM | OXYGEN SATURATION: 99 %

## 2020-01-07 DIAGNOSIS — R55 SYNCOPE, UNSPECIFIED SYNCOPE TYPE: Primary | ICD-10-CM

## 2020-01-07 DIAGNOSIS — R07.9 CHEST PAIN, UNSPECIFIED TYPE: ICD-10-CM

## 2020-01-07 NOTE — PATIENT INSTRUCTIONS
Echo and Nuclear stress-  Please call central scheduling at [phone]      All testing/lab work is completed at [address] [place] [address], [facility]     Call office 72 hrs after testing for results

## 2020-01-07 NOTE — PROGRESS NOTES
[facility]    Mr. Nolan Rivero is a 28-year-old male with history of alcohol dependence, DJD, history of subarachnoid hemorrhage, syncope    Patient is here today to establish care with me. He denies any prior history of myocardial infarction or congestive heart failure. Patient was seen by PCP and during exam and on EKG patient was found to have a PVC. This why patient was asked to come see me. Upon further questioning, patient tells me that he has been having loss of consciousness and fall frequently over last 3 to 4 years. He tells me that he probably has lost consciousness at least 10-15 times over last 4 years. Questioning he admits that he used to be alcohol dependent. However he has reduced drinking alcohol. He drinks alcohol probably 1 or 2 beer 4-5 times a day. Occasionally he would complain of chest pain which is random and does not happen with exertion. It feels like pressure sensation and sometimes sharp. That last usually less than a minute. It is not associated with syncope. Last syncopal episode was about 2 or 3 weeks ago when he was walking and suddenly passed out. He tells me that he did not drink alcohol that day. Denies any nausea, vomiting, abdominal pain, fever, chills, sputum production. No hematuria or other bleeding complaints    Past Medical History:   Diagnosis Date    DJD (degenerative joint disease)     ETOH abuse     Eye inflamed     Subarachnoid hemorrhage ([facility] [address] 09/2016    Syncope          Past Surgical History:   Procedure Laterality Date    COLONOSCOPY N/A 6/17/2019    COLONOSCOPY performed by Marcia Gonzalez MD at [facility] ENDOSCOPY    HX CATARACT REMOVAL Left 01/24/2018       No current outpatient medications on file. No current facility-administered medications for this visit.         Allergies and Sensitivities:  No Known Allergies    Family History:  Family History   Problem Relation Age of Onset   Светлана Shea Cancer Mother     Diabetes Mother     Cancer Father        Social History:  Social History     Tobacco Use    Smoking status: Current Every Day Smoker     Packs/day: 0.25     Years: 45.00     Pack years: 11.25    Smokeless tobacco: Never Used   Substance Use Topics    Alcohol use: Yes     Comment: 3-4 16 oz beers daily    Drug use: No     He  reports that he has been smoking. He has a 11.25 pack-year smoking history. He has never used smokeless tobacco.  He  reports current alcohol use. Review of Systems:  Cardiac symptoms as noted above in HPI. All others negative. Denies fatigue, malaise, skin rash, joint pain, blurring vision, photophobia, neck pain, hemoptysis, chronic cough, nausea, vomiting, hematuria, burning micturition, BRBPR, chronic headaches. Physical Exam:  BP Readings from Last 3 Encounters:   01/07/20 148/81   12/19/19 138/72   11/27/19 138/72         Pulse Readings from Last 3 Encounters:   01/07/20 88   12/19/19 86   11/27/19 (!) 113          Wt Readings from Last 3 Encounters:   01/07/20 210 lb (95.3 kg)   12/19/19 207 lb 3.2 oz (94 kg)   11/27/19 204 lb (92.5 kg)       Constitutional: Oriented to person, place, and time. HENT: Head: Normocephalic and atraumatic. Eyes: Conjunctivae and extraocular motions are normal.   Neck: No JVD present. Carotid bruit is not appreciated. Cardiovascular: Regular rhythm. No murmur, gallop or rubs appreciated  Lung: Breath sounds normal. No respiratory distress. No ronchi or rales appreciated  Abdominal: No tenderness. No rebound and no guarding. Musculoskeletal: There is no lower extremity edema. No cynosis  Lymphadenopathy:  No cervical or supraclavicular adenopathy appriciated. Neurological: No gross motor deficit noted. Skin: No visible skin rash noted. No Ear discharge noted  Psychiatric: Normal mood and affect.    Good distal pulse      Review of Data  LABS:   Lab Results   Component Value Date/Time    Sodium 138 11/27/2019 03:18 PM    Potassium 3.9 11/27/2019 03:18 PM    Chloride 107 11/27/2019 03:18 PM    CO2 27 11/27/2019 03:18 PM    Glucose 97 11/27/2019 03:18 PM    BUN 12 11/27/2019 03:18 PM    Creatinine 1.04 11/27/2019 03:18 PM     Lipids Latest Ref Rng & Units 8/27/2019   Chol, Total <200 MG/   HDL 40 - 60 MG/DL 56   LDL 0 - 100 MG/. 2(H)   Trig <150 MG/DL 99   Chol/HDL Ratio 0 - 5.0   3.2     Lab Results   Component Value Date/Time    ALT (SGPT) 25 11/27/2019 03:18 PM     No results found for: HBA1C, HGBE8, DYJ2VYKU, DXC1CTKE    EKG    HOLTER (12/19)  Sherice Marques was in Sinus . The average heart rate, excluding ectopy, was 87 BPM with a minimum of 60 BPM at 05:49 D2 and a maximum of 153 BPM at 19:21 D2. Heart beats, including ectopy, totaled 550182 beats. VENTRICULAR ECTOPICS totaled 5756 averaging 134. 2 per hour with 4941 single, 58 paired, 349 trigeminy and 0 R on T. BIGEMINY runs occurred  123 times and totaled 399 beats. PVC [place] 2.7%  SUPRAVENTRICULAR ECTOPICS totaled 43 averaging 1.0 per hour ,with 41 single and 2 paired beats. Patient diary was submitted, no symptoms noted. No patient triggered events noted. ECHO (05/19)  Left Ventricle Normal wall thickness. Mildly dilated left ventricle. Mild systolic dysfunction. The estimated ejection fraction is 46 - 50%. Visually measured ejection fraction. Global hypokinesis observed. There is severe (grade 3) left ventricular diastolic dysfunction. Left Atrium Mildly dilated left atrium. Right Ventricle Normal cavity size and global systolic function. Right Atrium Normal cavity size. Interatrial Septum Saline contrast not used. Aortic Valve No stenosis and no regurgitation. Mild aortic valve sclerosis with no evidence of reduced excursion. Mitral Valve No stenosis. Mitral valve non-specific thickening. w/o reduced excursion. Trace regurgitation. Tricuspid Valve Normal valve structure and no stenosis.  Tricuspid regurgitation is inadequate for estimation of right ventricular systolic pressure. Pulmonary arterial systolic pressure is 55.2 mmHg. Pulmonic Valve Pulmonic valve normal doppler findings. STRESS TEST (EST, PHARM, NUC, ECHO etc)    CATHETERIZATION    IMPRESSION & PLAN:  Mr. Nicolle Roca is 72-year-old male with medical problem as mentioned above    Chest pain:  Sending for angina. In setting of recurrent syncope and also chest discomfort, I recommend that he undergo echocardiogram to rule out structural abnormality of the heart and nuclear stress test to rule out ischemia    Cardiomyopathy:  Last EF 45-50% in 2019. Because of recurrent syncope and frequent PVC on EKG, recommend repeat echo    PVC:  PVC burden of 2.7% noted on Holter monitor in December 2019. Denies any symptoms. However because of frequent syncope, recommend echo and nuclear stress test    Syncope:  Patient has been having syncopal episode, unexplained for last 3 or 4 years. Initially he used to drink heavily however he tells me that he drinks 1 at the most 2 beers a few times a week. Echo and stress test has been ordered  If continues to have recurrent syncope, will need to consider loop recorder implantation  Patient has stopped driving. I have advised patient to avoid driving  Patient also is seeing a neurology team and EEG has been ordered    This plan was discussed with patient who is in agreement. Thank you for allowing me to participate in patient care. Please feel free to call me if you have any question or concern. Vince Nash MD  Please note: This document has been produced using voice recognition software. Unrecognized errors in transcription may be present.

## 2020-01-07 NOTE — PROGRESS NOTES
1. Have you been to the ER, urgent care clinic since your last visit? Hospitalized since your last visit? No.. New patient    2. Have you seen or consulted any other health care providers outside of the [address] [place] [address] since your last visit? Include any pap smears or colon screening.  No

## 2020-01-08 ENCOUNTER — OFFICE VISIT (OUTPATIENT)
Dept: FAMILY MEDICINE CLINIC | Age: 70
End: 2020-01-08

## 2020-01-08 VITALS
HEART RATE: 83 BPM | OXYGEN SATURATION: 100 % | HEIGHT: 69 IN | DIASTOLIC BLOOD PRESSURE: 72 MMHG | TEMPERATURE: 98.2 F | BODY MASS INDEX: 31.1 KG/M2 | RESPIRATION RATE: 16 BRPM | SYSTOLIC BLOOD PRESSURE: 141 MMHG | WEIGHT: 210 LBS

## 2020-01-08 DIAGNOSIS — M25.511 ACUTE PAIN OF RIGHT SHOULDER: ICD-10-CM

## 2020-01-08 DIAGNOSIS — R55 SYNCOPE, UNSPECIFIED SYNCOPE TYPE: Primary | ICD-10-CM

## 2020-01-08 DIAGNOSIS — M25.551 ACUTE RIGHT HIP PAIN: ICD-10-CM

## 2020-01-08 DIAGNOSIS — I49.3 FREQUENT PVCS: ICD-10-CM

## 2020-01-08 NOTE — PROGRESS NOTES
Chief Complaint   Patient presents with    Follow-up     Syncope    Results     Holter Monitor- 12-3-19        1. Have you been to the ER, urgent care clinic since your last visit? Hospitalized since your last visit? NO    2. Have you seen or consulted any other health care providers outside of the [address] [place] [address] since your last visit? Include any pap smears or colon screening.  NO

## 2020-01-08 NOTE — PROGRESS NOTES
[address] [place] [address]               [phone]      Shanell Hodge is a 77 y.o. male and presents with Follow-up (Syncope) and Results (Holter Monitor- 12-3-19 )       Assessment/Plan:    Diagnoses and all orders for this visit:    1. Syncope, unspecified syncope type  He is currently being followed by both neurology and cardiology to try to find the etiology of his syncope    2. Frequent PVCs  Noted on his Holter monitor results from 12/3/2019, cardiology aware    3. Acute pain of right shoulder  -     XR SHOULDER RT AP/LAT MIN 2 V; Future  We will obtain an x-ray for his continued shoulder pain status post fall    4. Acute right hip pain  -     XR HIP RT W OR WO PELV 2-3 VWS; Future  We will obtain an x-ray for his continued hip pain status post fall      Follow-up and Dispositions    · Return in about 3 months (around 4/8/2020) for syncope, hip and shoulder pain from fall, 30 minutes. Subjective:    Syncope  Has had appointment with neurology, plan is to get an EEG with  Has had an appointment with cardiology, plan is to get an echocardiogram and stress test  He has had no more near syncopal or syncopal events  He has not been consuming any alcohol      Pain in right shoulder and hip  Onset: After fall in end of November  Location: Right shoulder   Characteristics: Cannot lift arm up past 90 degress, hurts on back bone, hurts all the time, has not tried treatments, nothing makes it better and lying down too long makes it worse, no shoulder problem prior to fall, no right arm pain  Right hip: pain radiates across lower back, no hip or back pain prior to fall, is using a \"body heat\" patch, it helps sometimes, made worse when lying down too long, no right leg problem    ROS: As stated in HPI, otherwise all others negative. ROS    The problem list was updated as a part of today's visit.   Patient Active Problem List   Diagnosis Code    Syncope R55    Anemia D64.9    Acute blood loss anemia D62    Frequent PVCs I49. 3       The PSH, FH were reviewed. SH:  Social History     Tobacco Use    Smoking status: Current Every Day Smoker     Packs/day: 0.25     Years: 45.00     Pack years: 11.25    Smokeless tobacco: Never Used   Substance Use Topics    Alcohol use: Yes     Comment: 3-4 16 oz beers daily    Drug use: No       Medications/Allergies:  No current outpatient medications on file prior to visit. No current facility-administered medications on file prior to visit. No Known Allergies    Objective:  Visit Vitals  /72   Pulse 83   Temp 98.2 °F (36.8 °C) (Oral)   Resp 16   Ht 5' 9\" (1.753 m)   Wt 210 lb (95.3 kg)   SpO2 100%   BMI 31.01 kg/m²    Body mass index is 31.01 kg/m². Physical assessment  Physical Exam  Vitals signs and nursing note reviewed. HENT:      Head: Normocephalic and atraumatic. Cardiovascular:      Rate and Rhythm: Normal rate and regular rhythm. Heart sounds: Normal heart sounds. Pulmonary:      Effort: Pulmonary effort is normal.      Breath sounds: Normal breath sounds. Musculoskeletal:      Right shoulder: He exhibits decreased range of motion and tenderness. He exhibits no swelling and no deformity. Right hip: He exhibits normal range of motion, no swelling and no deformity. Skin:     General: Skin is warm and dry. Neurological:      Mental Status: He is alert and oriented to person, place, and time. Gait: Gait is intact.            Labwork and Ancillary Studies:    CBC w/Diff  Lab Results   Component Value Date/Time    WBC 4.3 (L) 11/27/2019 03:18 PM    HGB 9.8 (L) 11/27/2019 03:18 PM    PLATELET 796 28/33/1482 03:18 PM         Basic Metabolic Profile  Lab Results   Component Value Date/Time    Sodium 138 11/27/2019 03:18 PM    Potassium 3.9 11/27/2019 03:18 PM    Chloride 107 11/27/2019 03:18 PM    CO2 27 11/27/2019 03:18 PM    Anion gap 4 11/27/2019 03:18 PM    Glucose 97 11/27/2019 03:18 PM    BUN 12 11/27/2019 03:18 PM    Creatinine 1.04 11/27/2019 03:18 PM    BUN/Creatinine ratio 12 11/27/2019 03:18 PM    GFR est AA >60 11/27/2019 03:18 PM    GFR est non-AA >60 11/27/2019 03:18 PM    Calcium 9.5 11/27/2019 03:18 PM        Cholesterol  Lab Results   Component Value Date/Time    Cholesterol, total 179 08/27/2019 03:53 PM    HDL Cholesterol 56 08/27/2019 03:53 PM    LDL, calculated 103.2 (H) 08/27/2019 03:53 PM    Triglyceride 99 08/27/2019 03:53 PM    CHOL/HDL Ratio 3.2 08/27/2019 03:53 PM       Health Maintenance:   Health Maintenance   Topic Date Due    DTaP/Tdap/Td series (1 - Tdap) 12/30/1962    Shingrix Vaccine Age 50> (1 of 2) 12/30/2001    FOBT Q 1 YEAR AGE 50-75  12/30/2001    GLAUCOMA SCREENING Q2Y  12/30/2016    AAA Screening 73-69 YO Male Smoking Patients  12/30/2016    Pneumococcal 65+ years (1 of 1 - PPSV23) 12/30/2016    MEDICARE YEARLY EXAM  08/27/2020    Hepatitis C Screening  Completed    Influenza Age 5 to Adult  Completed       I have discussed the diagnosis with the patient and the intended plan as seen in the above orders. The patient has received an After-Visit Summary and questions were answered concerning future plans. An After Visit Summary was printed and given to the patient. All diagnosis have been discussed with the patient and all of the patient's questions have been answered. Follow-up and Dispositions    · Return in about 3 months (around 4/8/2020) for syncope, hip and shoulder pain from fall, 30 minutes. Fiazan Hampton, [facility]  [facility]   [address] [place] [address]  [address]

## 2020-01-15 ENCOUNTER — HOSPITAL ENCOUNTER (OUTPATIENT)
Dept: NON INVASIVE DIAGNOSTICS | Age: 70
Discharge: HOME | End: 2020-01-15
Attending: INTERNAL MEDICINE
Payer: MEDICARE

## 2020-01-15 ENCOUNTER — HOSPITAL ENCOUNTER (OUTPATIENT)
Dept: NUCLEAR MEDICINE | Age: 70
Discharge: HOME | End: 2020-01-15
Attending: INTERNAL MEDICINE
Payer: MEDICARE

## 2020-01-15 VITALS
SYSTOLIC BLOOD PRESSURE: 122 MMHG | WEIGHT: 208 LBS | BODY MASS INDEX: 29.78 KG/M2 | HEIGHT: 70 IN | DIASTOLIC BLOOD PRESSURE: 60 MMHG

## 2020-01-15 VITALS
BODY MASS INDEX: 31.1 KG/M2 | HEIGHT: 69 IN | DIASTOLIC BLOOD PRESSURE: 81 MMHG | SYSTOLIC BLOOD PRESSURE: 148 MMHG | WEIGHT: 210 LBS

## 2020-01-15 DIAGNOSIS — R55 SYNCOPE, UNSPECIFIED SYNCOPE TYPE: ICD-10-CM

## 2020-01-15 DIAGNOSIS — R07.9 CHEST PAIN, UNSPECIFIED TYPE: ICD-10-CM

## 2020-01-15 LAB
ECHO AO ASC DIAM: 3.2 CM
ECHO AO ROOT DIAM: 2.97 CM
ECHO EST RA PRESSURE: 3 MMHG
ECHO LA MAJOR AXIS: 3.39 CM
ECHO LA TO AORTIC ROOT RATIO: 1.14
ECHO LV EDV A2C: 73.6 ML
ECHO LV EDV A4C: 161.7 ML
ECHO LV EDV INDEX A4C: 76.7 ML/M2
ECHO LV EDV NDEX A2C: 34.9 ML/M2
ECHO LV EDV TEICHHOLZ: 1.11 ML
ECHO LV ESV A4C: 53 ML
ECHO LV ESV INDEX A4C: 25.1 ML/M2
ECHO LV ESV TEICHHOLZ: 0.55 ML
ECHO LV INTERNAL DIMENSION DIASTOLIC: 5.8 CM (ref 4.2–5.9)
ECHO LV INTERNAL DIMENSION SYSTOLIC: 4.29 CM
ECHO LV IVSD: 1.03 CM (ref 0.6–1)
ECHO LV MASS 2D: 286.6 G (ref 88–224)
ECHO LV MASS INDEX 2D: 135.9 G/M2 (ref 49–115)
ECHO LV POSTERIOR WALL DIASTOLIC: 1.02 CM (ref 0.6–1)
ECHO LVOT DIAM: 2.39 CM
ECHO LVOT PEAK GRADIENT: 5 MMHG
ECHO LVOT PEAK VELOCITY: 111.99 CM/S
ECHO LVOT SV: 103.7 ML
ECHO LVOT VTI: 23.16 CM
ECHO MV A VELOCITY: 85.38 CM/S
ECHO MV AREA PHT: 2.8 CM2
ECHO MV E DECELERATION TIME (DT): 266.7 MS
ECHO MV E VELOCITY: 81.54 CM/S
ECHO MV E/A RATIO: 0.96
ECHO MV PRESSURE HALF TIME (PHT): 77.4 MS
ECHO RA MINOR AXIS: 3.94 CM
LVFS 2D: 25.97 %
LVOT MG: 2.07 MMHG
LVOT MV: 0.62 CM/S
LVSV (MOD SINGLE 4C): 54.56 ML
LVSV (TEICH): 42.01 ML
MV DEC SLOPE: 3.06
STRESS BASELINE HR: 54 BPM
STRESS ESTIMATED WORKLOAD: 1 METS
STRESS EXERCISE DUR MIN: NORMAL
STRESS PEAK DIAS BP: 68 MMHG
STRESS PEAK SYS BP: 145 MMHG
STRESS PERCENT HR ACHIEVED: 61 %
STRESS POST PEAK HR: 92 BPM
STRESS RATE PRESSURE PRODUCT: NORMAL BPM*MMHG
STRESS ST DEPRESSION: 0 MM
STRESS ST ELEVATION: 0 MM
STRESS TARGET HR: 152 BPM

## 2020-01-15 PROCEDURE — 93017 CV STRESS TEST TRACING ONLY: CPT

## 2020-01-15 PROCEDURE — 93306 TTE W/DOPPLER COMPLETE: CPT

## 2020-01-15 PROCEDURE — 74011250636 HC RX REV CODE- 250/636: Performed by: INTERNAL MEDICINE

## 2020-01-15 PROCEDURE — A9500 TC99M SESTAMIBI: HCPCS

## 2020-01-15 RX ADMIN — REGADENOSON 0.4 MG: 0.08 INJECTION, SOLUTION INTRAVENOUS at 10:55

## 2020-01-17 ENCOUNTER — HOSPITAL ENCOUNTER (OUTPATIENT)
Dept: NEUROLOGY | Age: 70
Discharge: HOME | End: 2020-01-17
Attending: STUDENT IN AN ORGANIZED HEALTH CARE EDUCATION/TRAINING PROGRAM
Payer: MEDICARE

## 2020-01-17 DIAGNOSIS — R40.4 TRANSIENT ALTERATION OF AWARENESS: ICD-10-CM

## 2020-01-17 PROCEDURE — 95816 EEG AWAKE AND DROWSY: CPT

## 2020-01-21 NOTE — PROCEDURES
[facility]  EEG    Name:  Herson Agarwal  MR#:   [id number]  :  1950  ACCOUNT #:  [de-identified]  DATE OF SERVICE:  2020    REFERRING PROVIDER:  Miguel Angel Lucas MD    INDICATION:  Seizures. CLINICAL HISTORY:  This is an apparently wakeful EEG on this 78-year-old man with a history of head trauma with subarachnoid hemorrhage in 2016 and a type of recurrent passing out spells. These spells come without any warning and he fell to the ground. MEDICATIONS:  None indicated at this time. EEG REPORT:  The predominant apparently wakeful background consists of 20-30 microvolts, sinusoidal and symmetrical, 8-9 Hz waves which attenuate well with eye opening. Bifrontal 3-5 microvolts, 16-20 Hz waves are seen, which are symmetrical in their occurrence. Step flash photic stimulation was performed that caused no driving. Hyperventilation did not change the record. IMPRESSION:  This is a normal wakeful EEG. No epileptiform or focal abnormality was identified.       Reshma Yi MD      [id number]  D:  2020 14:25  T:  2020 20:38  JOB #:  [id number]  CC:  Miguel Angel Lucas MD

## 2020-01-23 ENCOUNTER — OFFICE VISIT (OUTPATIENT)
Dept: ORTHOPEDIC SURGERY | Facility: CLINIC | Age: 70
End: 2020-01-23

## 2020-01-23 VITALS
TEMPERATURE: 97.3 F | DIASTOLIC BLOOD PRESSURE: 70 MMHG | OXYGEN SATURATION: 99 % | RESPIRATION RATE: 18 BRPM | WEIGHT: 207.8 LBS | BODY MASS INDEX: 30.78 KG/M2 | HEART RATE: 63 BPM | HEIGHT: 69 IN | SYSTOLIC BLOOD PRESSURE: 147 MMHG

## 2020-01-23 DIAGNOSIS — M16.11 ARTHRITIS OF RIGHT HIP: Primary | ICD-10-CM

## 2020-01-23 DIAGNOSIS — M25.651 DECREASED RANGE OF RIGHT HIP MOVEMENT: ICD-10-CM
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
## 2020-01-23 RX ORDER — DICLOFENAC SODIUM 75 MG/1
75 TABLET, DELAYED RELEASE ORAL 2 TIMES DAILY
Qty: 60 TAB | Refills: 0 | Status: SHIPPED | OUTPATIENT
Start: 2020-01-23 | End: 2020-03-09 | Stop reason: SDUPTHER

## 2020-01-23 NOTE — PROGRESS NOTES
EXAM: Right hip series, 2 views     INDICATION: Right hip and shoulder pain. Patient fell 2 months ago.     COMPARISON: None.     _______________     FINDINGS:     Large acetabular and femoral head marginal osteophytes are present. The right  hip joint is circumferentially, considerably narrowed. Articular surface of the  femoral head is slightly flattened medially. Small subchondral cysts are present  on both sides of the joint.     Similar degenerative changes are noted on the left, although the femoral head  appears normal in configuration.     Soft tissues are within normal range.  _______________     IMPRESSION  IMPRESSION:     Advanced osteoarthritis of both hips, right greater than left, with slight  flattening of the weightbearing articular surface of the right femoral head,  possibly avascular necrosis. HISTORY OF PRESENT ILLNESS:  Mac Ontiveros presents to the office a 28-year-old,  female male with a history times several months of worsening bilateral foot pain, right greater than left today. He was seen by his PCP. X-rays were ordered that were done through [facility] on January 8, 2020, revealing severe changes, advanced arthritis of both hips, right greater than left, with flattening of the weightbearing articular surface of the right femoral head and possibly AVN noted. The patient is able to get around with some difficulty. He is managing his pain with over-the-counter analgesics to include Motrin and Tylenol. He has pain radiating in both groin when he first stands from a seated position and sits from a standing position. He is limited in his distance walking secondary to groin pain as well but feels like most of his complaint falls to the side of weakness in the legs. He has no history of trauma. He does not wish for a total joint replacement. ALLERGIES:  None. FAMILY HISTORY:  Noncontributory.      PHYSICAL EXAM:  He is a healthy-appearing, well-developed, well-nourished, pleasant, 28-year-old, obese,  male, atraumatic, normocephalic, alert and oriented times three, sitting on the table comfortably. He is in shorts. His BMI is 30.69. Weight 207 pounds. Height is 5'9\". Examination of the bilateral hips reveals no warmth, erythema, edema, ecchymosis, or effusions. The skin is intact bilaterally. With the patient sitting on the exam room table with both knees flexed at 90ø, his external rotation of the right hip is 0ø with pain reproduced in the groin, and internal rotation is 5ø. The left hip with the knee flexed at 90ø reveals internal and external rotation respectively at 5ø and 10ø also with pain but not as severe as on the right. The patient has pronounced weakness of his hip flexors against resistance today noted at 3+/5. Quad and femoral nerve activity is intact fully to the bilateral lower extremities. Capillary refill is brisk and less than 2 seconds to the right lower extremity. He does have 1+ edema to both lower legs to include the dorsum of his feet. RADIOGRAPHS:  The AP of the pelvis reviewed today reveals severe dysplastic changes seen in the right hip with advanced end-stage osteoarthritis noted bilaterally. His left pelvis is in the image internally rotated. IMPRESSION:      1. End-stage osteoarthritis of both hips with dysplastic changes seen and possible AVN of the right. 2. Bilateral hip pains. 3. Decreased range of motion of the bilateral hips secondary to above. PLAN:   The patient, after a detailed description, did not want a hip replacement to be an option for him. He did, however, accept the recommendation for an intraarticular cortisone injection for his right hip. I am going to see him back on a prn basis. He was recommended to use Voltaren enteric coated tablets one po b.i.d. in place of Motrin or Advil products. He is also going to avoid any Aleve or similar.   Today, all his questions were answered to his satisfaction. Copies of his x-rays were reviewed and provided.

## 2020-02-03 NOTE — PROGRESS NOTES
Called patient and gave MRI results patient understands and also let him know if needs a follow up appointment to call the office.

## 2020-05-04 DIAGNOSIS — M16.11 ARTHRITIS OF RIGHT HIP: ICD-10-CM

## 2020-05-04 DIAGNOSIS — M25.651 DECREASED RANGE OF RIGHT HIP MOVEMENT: ICD-10-CM

## 2020-05-05 RX ORDER — DICLOFENAC SODIUM 75 MG/1
TABLET, DELAYED RELEASE ORAL
Qty: 60 TAB | Refills: 0 | Status: SHIPPED | OUTPATIENT
Start: 2020-05-05 | End: 2020-08-06 | Stop reason: SDUPTHER

## 2020-05-14 ENCOUNTER — OFFICE VISIT (OUTPATIENT)
Dept: CARDIOLOGY CLINIC | Age: 70
End: 2020-05-14

## 2020-05-14 VITALS
SYSTOLIC BLOOD PRESSURE: 122 MMHG | HEIGHT: 69 IN | BODY MASS INDEX: 31.1 KG/M2 | TEMPERATURE: 98.8 F | DIASTOLIC BLOOD PRESSURE: 59 MMHG | WEIGHT: 210 LBS | HEART RATE: 82 BPM | OXYGEN SATURATION: 98 %

## 2020-05-14 DIAGNOSIS — R55 SYNCOPE, UNSPECIFIED SYNCOPE TYPE: ICD-10-CM

## 2020-05-14 DIAGNOSIS — I49.3 FREQUENT PVCS: Primary | ICD-10-CM

## 2020-05-14 NOTE — PROGRESS NOTES
Nica Bi presents today for f/u    Nica Bi preferred language for health care discussion is english/other. Is someone accompanying this pt? No     Is the patient using any DME equipment during OV? no    Depression Screening:  3 most recent PHQ Screens 1/23/2020   PHQ Not Done Patient Decline   Little interest or pleasure in doing things -   Feeling down, depressed, irritable, or hopeless -   Total Score PHQ 2 -       Learning Assessment:  Learning Assessment 8/27/2019   PRIMARY LEARNER Patient   HIGHEST LEVEL OF EDUCATION - PRIMARY LEARNER  -   PRIMARY LANGUAGE ENGLISH   LEARNER PREFERENCE PRIMARY PICTURES     LISTENING     VIDEOS     DEMONSTRATION     READING   ANSWERED BY patient   RELATIONSHIP SELF       Abuse Screening:  Abuse Screening Questionnaire 1/8/2020   Do you ever feel afraid of your partner? N   Are you in a relationship with someone who physically or mentally threatens you? N   Is it safe for you to go home? Y       Fall Risk  Fall Risk Assessment, last 12 mths 1/23/2020   Able to walk? Yes   Fall in past 12 months? No   Fall with injury? -   Number of falls in past 12 months -   Fall Risk Score -       Pt currently taking Anticoagulant therapy? No     Coordination of Care:  1. Have you been to the ER, urgent care clinic since your last visit? Hospitalized since your last visit? No     2. Have you seen or consulted any other health care providers outside of the [address] [place] [address] since your last visit? Include any pap smears or colon screening. no

## 2020-05-14 NOTE — PROGRESS NOTES
[facility]    Mr. Anna Marie Rainey is a 77 y.o. male with history of alcohol dependence, DJD, history of subarachnoid hemorrhage, syncope    Patient is here today for follow-up appointment. He denies any hospital admission or ER visits since last time. About 2 months ago, patient had another episode of syncope. He said that he was about to go to bathroom for the urination however when he entered the bathroom, he blacked out for less than 30 seconds. He did not injure himself. He does not recall having any preceding symptoms. He did not have any bowel or bladder incontinence. Otherwise he denies any chest pain or chest tightness. He denies any palpitation  Denies any nausea, vomiting, abdominal pain, fever, chills, sputum production. No hematuria or other bleeding complaints    Past Medical History:   Diagnosis Date    DJD (degenerative joint disease)     ETOH abuse     Eye inflamed     Subarachnoid hemorrhage ([facility] [address] 09/2016    Syncope          Past Surgical History:   Procedure Laterality Date    COLONOSCOPY N/A 6/17/2019    COLONOSCOPY performed by Enrique Gross MD at [facility]    HX CATARACT REMOVAL Left 01/24/2018       Current Outpatient Medications   Medication Sig    diclofenac EC (VOLTAREN) 75 mg EC tablet TAKE ONE TABLET BY MOUTH TWICE A DAY     No current facility-administered medications for this visit. Allergies and Sensitivities:  No Known Allergies    Family History:  Family History   Problem Relation Age of Onset   25 [facility] [place] Cancer Mother     Diabetes Mother     Cancer Father        Social History:  Social History     Tobacco Use    Smoking status: Current Every Day Smoker     Packs/day: 0.25     Years: 45.00     Pack years: 11.25    Smokeless tobacco: Never Used   Substance Use Topics    Alcohol use: Yes     Comment: 3-4 16 oz beers daily    Drug use: No     He  reports that he has been smoking.  He has a 11.25 pack-year smoking history. He has never used smokeless tobacco.  He  reports current alcohol use. Review of Systems:  Cardiac symptoms as noted above in HPI. All others negative. Denies fatigue, malaise, skin rash, joint pain, blurring vision, photophobia, neck pain, hemoptysis, chronic cough, nausea, vomiting, hematuria, burning micturition, BRBPR, chronic headaches. Physical Exam:  BP Readings from Last 3 Encounters:   05/14/20 122/59   01/23/20 147/70   01/15/20 148/81         Pulse Readings from Last 3 Encounters:   05/14/20 82   01/23/20 63   01/08/20 83          Wt Readings from Last 3 Encounters:   05/14/20 210 lb (95.3 kg)   01/23/20 207 lb 12.8 oz (94.3 kg)   01/15/20 210 lb (95.3 kg)       Constitutional: Oriented to person, place, and time. HENT: Head: Normocephalic and atraumatic  Neck: No JVD present. Carotid bruit is not appreciated. Cardiovascular: Regular rhythm. No murmur, gallop or rubs appreciated  Lung: Breath sounds normal. No respiratory distress. No ronchi or rales appreciated  Abdominal: No tenderness. No rebound and no guarding. Musculoskeletal: There is no lower extremity edema. No cynosis      Review of Data  LABS:   Lab Results   Component Value Date/Time    Sodium 138 11/27/2019 03:18 PM    Potassium 3.9 11/27/2019 03:18 PM    Chloride 107 11/27/2019 03:18 PM    CO2 27 11/27/2019 03:18 PM    Glucose 97 11/27/2019 03:18 PM    BUN 12 11/27/2019 03:18 PM    Creatinine 1.04 11/27/2019 03:18 PM     Lipids Latest Ref Rng & Units 8/27/2019   Chol, Total <200 MG/   HDL 40 - 60 MG/DL 56   LDL 0 - 100 MG/. 2(H)   Trig <150 MG/DL 99   Chol/HDL Ratio 0 - 5.0   3.2     Lab Results   Component Value Date/Time    ALT (SGPT) 25 11/27/2019 03:18 PM     No results found for: HBA1C, HGBE8, WNZ8YROL, NKG3OFFR, OEL7TDRS    EKG    HOLTER (12/19)  Clay Arndt was in Sinus .   The average heart rate, excluding ectopy, was 87 BPM with a minimum of 60 BPM at 05:49 D2 and a maximum of 153 BPM at 19:21 D2. Heart beats, including ectopy, totaled 484459 beats. VENTRICULAR ECTOPICS totaled 5756 averaging 134. 2 per hour with 4941 single, 58 paired, 349 trigeminy and 0 R on T. BIGEMINY runs occurred  123 times and totaled 399 beats. PVC [place] 2.7%  SUPRAVENTRICULAR ECTOPICS totaled 43 averaging 1.0 per hour ,with 41 single and 2 paired beats. Patient diary was submitted, no symptoms noted. No patient triggered events noted. ECHO (01/20)  Left Ventricle Normal cavity size and wall thickness. Low normal systolic function. The calculated ejection fraction is 50%. Visually measured ejection fraction. Global hypokinesis observed. There is mild (grade 1) left ventricular diastolic dysfunction. Left Atrium Moderately dilated left atrium. Left Atrium volume index is 49 mL/m2. Right Ventricle Normal cavity size and global systolic function. Right Atrium Normal cavity size. Aortic Valve Normal valve structure, no stenosis and no regurgitation. Mitral Valve No stenosis. Mitral valve non-specific thickening. Trace regurgitation. Tricuspid Valve No stenosis. Tricuspid regurgitation is inadequate for estimation of right ventricular systolic pressure. There is no evidence of pulmonary hypertension. Pulmonic Valve Pulmonic valve not well visualized, but normal doppler findings. Aorta Normal aortic root and ascending aorta.     )    STRESS TEST (01/20)  · Baseline ECG: Normal EKG, non-specific ST-T wave abnormalities. · Gated SPECT: Left ventricular function post-stress was abnormal. Calculated ejection fraction is 48%. · Left ventricular perfusion is probably normal.  · Myocardial perfusion imaging defect 1: There is a defect that is small in size present in the inferior location(s) that is more pronounced with rest than stress imaging. The defect appears to probably be artifact. · There was no convincing evidence of significant reversible defect to suggest on going major ischemia. Inferior defect is most consistent with diaphragmatic attenuation artifact       CATHETERIZATION    IMPRESSION & PLAN:  Mr. Dara Kaufman is 70-year-old male with medical problem as mentioned above    Chest pain:  No chest pain since last visit. Nuclear stress test in January 2020, low risk  Continue with risk factor modification    Cardiomyopathy:  Last EF 45-50% in 2019. Repeat echo in January 2020, EF 50%  And thinks that he may be taking carvedilol twice daily. I have asked him to go home and call me with a list of medication. PVC:  PVC burden of 2.7% noted on Holter monitor in December 2019. Denies any symptoms. Patient thinks that he is taking probably carvedilol twice a day. I do not have any of this record. Have asked him to call h back with the medication list    Syncope:  Patient has been having syncopal episode, unexplained for last 3 or 4 years. 1-2 can beers every other day. Echo and stress , low risk  Had 1 more episode of syncope since last visit. Because of recurrent syncope, I recommend that he consider loop recorder implantation to rule out any underlying arrhythmia  Discussed with patient the procedure detail about loop recorder implantation. Risk, benefit, necessity and alternatives were discussed in detail. Complication of procedure were also discussed with patient in detail including but not limited to bleeding, infection, need for emergent surgery, heart attack, etc.... Patient is agreeable with procedure. This plan was discussed with patient who is in agreement. Thank you for allowing me to participate in patient care. Please feel free to call me if you have any question or concern. Lali Goodwin MD  Please note: This document has been produced using voice recognition software. Unrecognized errors in transcription may be present.

## 2020-05-14 NOTE — PATIENT INSTRUCTIONS
Meds  Confirm taking Coreg -contact office and advise              Depaul          Patient  EP Instructions      1. You are scheduled to have a loop on Loop recorder at  12 pm. Please arrive at 10 am.    2. Please go to [facility] and park in the outpatient parking lot. Once you enter check in with the  there. The  will either give you directions or assist you in getting to the cath holding area. 3.  [x]       You are not to eat or drink anything after midnight the morning of the               procedure. 4. Please continue to take your medications with a small sip of water on the morning of the procedure with the following exceptions:      5. If you are diabetic, do not take your insulin/sugar pill the morning of the procedure. Pre -procedure LABWORK is to be done within one week of your procedure date     6. We encourage families to wait in the waiting room on the first floor while the procedure is being done. The Doctor will come out and talk with you as soon as the procedure is over. 7. There is the possibility that you may spend the night in the hospital, depending on the results of the procedure. This will be determined after the procedure is done. 8.   If you or your family have any questions, please call our office Monday-Friday 9:00am         -4:30 pm , at [phone], and ask to speak to one of the nurses.

## 2020-05-22 ENCOUNTER — HOSPITAL ENCOUNTER (OUTPATIENT)
Dept: PREADMISSION TESTING | Age: 70
Discharge: HOME | End: 2020-05-22
Payer: MEDICARE

## 2020-05-22 ENCOUNTER — TELEPHONE (OUTPATIENT)
Dept: CARDIOLOGY CLINIC | Age: 70
End: 2020-05-22

## 2020-05-22 DIAGNOSIS — R55 SYNCOPE, UNSPECIFIED SYNCOPE TYPE: ICD-10-CM

## 2020-05-22 DIAGNOSIS — I49.3 FREQUENT PVCS: ICD-10-CM

## 2020-05-22 LAB
INR PPP: 1.2 (ref 0.8–1.2)
PROTHROMBIN TIME: 15 SEC (ref 11.5–15.2)

## 2020-05-22 PROCEDURE — 85610 PROTHROMBIN TIME: CPT

## 2020-05-22 PROCEDURE — 36415 COLL VENOUS BLD VENIPUNCTURE: CPT

## 2020-05-22 NOTE — TELEPHONE ENCOUNTER
Contacted [facility]. Two patient Identifiers confirmed. Attempted to initiate PA for loop implant. Per representative no Ferne Bailon is required. Pt verbalized understanding.

## 2020-05-29 ENCOUNTER — HOSPITAL ENCOUNTER (OUTPATIENT)
Age: 70
Setting detail: OUTPATIENT SURGERY
Discharge: HOME | End: 2020-05-29
Attending: INTERNAL MEDICINE | Admitting: INTERNAL MEDICINE
Payer: MEDICARE

## 2020-05-29 VITALS
WEIGHT: 210.31 LBS | HEIGHT: 70 IN | BODY MASS INDEX: 30.11 KG/M2 | RESPIRATION RATE: 18 BRPM | DIASTOLIC BLOOD PRESSURE: 76 MMHG | OXYGEN SATURATION: 100 % | HEART RATE: 75 BPM | TEMPERATURE: 98.6 F | SYSTOLIC BLOOD PRESSURE: 123 MMHG

## 2020-05-29 DIAGNOSIS — R55 RECURRENT SYNCOPE: ICD-10-CM

## 2020-05-29 PROCEDURE — 77030040174 HC ADH SKN AFFIX MDII -B: Performed by: INTERNAL MEDICINE

## 2020-05-29 PROCEDURE — C1764 EVENT RECORDER, CARDIAC: HCPCS | Performed by: INTERNAL MEDICINE

## 2020-05-29 PROCEDURE — 33285 INSJ SUBQ CAR RHYTHM MNTR: CPT | Performed by: INTERNAL MEDICINE

## 2020-05-29 PROCEDURE — 74011250636 HC RX REV CODE- 250/636: Performed by: INTERNAL MEDICINE

## 2020-05-29 PROCEDURE — 77030002933 HC SUT MCRYL J&J -A: Performed by: INTERNAL MEDICINE

## 2020-05-29 PROCEDURE — 74011000250 HC RX REV CODE- 250: Performed by: INTERNAL MEDICINE

## 2020-05-29 DEVICE — MON LNQ11 REVEAL LINQ USA FW2.0
Type: IMPLANTABLE DEVICE | Status: FUNCTIONAL
Brand: REVEAL LINQ™

## 2020-05-29 RX ORDER — LIDOCAINE HYDROCHLORIDE AND EPINEPHRINE 10; 10 MG/ML; UG/ML
INJECTION, SOLUTION INFILTRATION; PERINEURAL AS NEEDED
Status: DISCONTINUED | OUTPATIENT
Start: 2020-05-29 | End: 2020-05-29 | Stop reason: HOSPADM

## 2020-05-29 RX ORDER — SODIUM CHLORIDE 9 MG/ML
20 INJECTION, SOLUTION INTRAVENOUS CONTINUOUS
Status: DISCONTINUED | OUTPATIENT
Start: 2020-05-29 | End: 2020-05-29 | Stop reason: HOSPADM

## 2020-05-29 RX ADMIN — SODIUM CHLORIDE 20 ML/HR: 900 INJECTION, SOLUTION INTRAVENOUS at 11:17

## 2020-05-29 NOTE — DISCHARGE INSTRUCTIONS
Patient Education        Cardiac Event Monitoring: About This Test  What is it? A cardiac event monitor is a small device that you wear or keep with you. It records the electrical activity of your heart. It records times when your heartbeat is too fast, too slow, or irregular. These are called cardiac events. The monitor will give your doctor the same kind of information as an electrocardiogram (EKG or ECG). An EKG shows the heart's electrical activity as line tracings on paper. There are different types of monitors. Your doctor will choose the type that works best for you and is most likely to help find your heart problem. Why is this test done? This test is used to look for irregular heartbeats. It can help your doctor find out what is causing symptoms such as chest pain, fainting, or lightheadedness. It also can help the doctor see if treatment for an abnormal heartbeat is working. Many people have abnormal heartbeats from time to time. Because these kinds of heartbeats can come and go, it may be hard to record one while you are in the doctor's office. Tracking your heartbeat for a longer time and during your whole day makes it easier to record your cardiac events. How is the test done? If you are getting a monitor with electrode pads on your chest:  · Several areas on your chest may be shaved and cleaned. Then a small amount of gel will be put on those areas. The electrode pads will then be attached to the skin of your chest. Thin wires will connect the electrodes to the monitor. · You will get instructions for how and when to change the electrodes at home. Some types of monitors don't use electrode pads. Some types are worn on your wrist like a watch. Others are stuck to your chest with a sticky patch. Or you may have a monitor that you carry with you. Your doctor will explain which type of monitor you have and how to use it.   Some monitors start recording on their own when they detect an abnormal heartbeat. With others, you may have to start the recording when you have symptoms. Your doctor will explain which type of monitor you have and how to use it. How is the monitor used? · With some monitors, you may need to do something to record when you have symptoms. You might use a handheld device to start it. Or you may need to press a button on the monitor. · You may keep a diary of what you were doing when you had symptoms such as chest pain or dizziness. Your doctor will show you how to do this. · You may need to send the information from your monitor to your doctor through a phone line or online. Some monitors send it on their own. You will get instructions from your doctor. Your information will stay private and secure no matter how it's sent. · You may be able to do most of the things you usually do. But try to follow your doctor's instructions for bathing, exercise, and other activities. How long will you have the monitor? You may use the monitor for up to a month or longer. It depends on how long it takes to record irregular heartbeat episodes. It also depends on how long your doctor wants to keep monitoring your heart. What happens after the test?  · Arnav Altamirano return the monitor to your doctor's office or hospital.  · You'll meet with your doctor to talk about the information recorded during your test.  · Your doctor will check your diary of symptoms. He or she will compare the timing of your activities and symptoms with the recorded heart pattern. · Depending on your test results, your doctor may talk with you about other tests or treatment options. Follow-up care is a key part of your treatment and safety. Be sure to make and go to all appointments, and call your doctor if you are having problems. It's also a good idea to keep a list of the medicines you take. Ask your doctor when you can expect to have your test results. Where can you learn more?   Go to http://shaquille-licha.info/  Enter [id number] in the search box to learn more about \"Cardiac Event Monitoring: About This Test.\"  Current as of: December 16, 2019               Content Version: 12.5  © 2006-2020 [employer] Care instructions adapted under license by [employer] (which disclaims liability or warranty for this information). If you have questions about a medical condition or this instruction, always ask your healthcare professional. [address] any warranty or liability for your use of this information. DISCHARGE SUMMARY from Nurse    PATIENT INSTRUCTIONS:    After general anesthesia or intravenous sedation, for 24 hours or while taking prescription Narcotics:  · Limit your activities  · Do not drive and operate hazardous machinery  · Do not make important personal or business decisions  · Do  not drink alcoholic beverages  · If you have not urinated within 8 hours after discharge, please contact your surgeon on call. Report the following to your surgeon:  · Excessive pain, swelling, redness or odor of or around the surgical area  · Temperature over 100.5  · Nausea and vomiting lasting longer than 4 hours or if unable to take medications  · Any signs of decreased circulation or nerve impairment to extremity: change in color, persistent  numbness, tingling, coldness or increase pain  · Any questions    What to do at Home:  These are general instructions for a healthy lifestyle:    No smoking/ No tobacco products/ Avoid exposure to second hand smoke  Surgeon General's Warning:  Quitting smoking now greatly reduces serious risk to your health.     Obesity, smoking, and sedentary lifestyle greatly increases your risk for illness    A healthy diet, regular physical exercise & weight monitoring are important for maintaining a healthy lifestyle    You may be retaining fluid if you have a history of heart failure or if you experience any of the following symptoms:  Weight gain of 3 pounds or more overnight or 5 pounds in a week, increased swelling in our hands or feet or shortness of breath while lying flat in bed. Please call your doctor as soon as you notice any of these symptoms; do not wait until your next office visit. The discharge information has been reviewed with the patient. The patient verbalized understanding. Discharge medications reviewed with the patient and appropriate educational materials and side effects teaching were provided. ___________________________________________________________________________________________________________________________________    Patient armband removed and shredded  Patient Education   Learning About Coronavirus [phone])  Coronavirus [phone]): Overview  What is coronavirus (GWGYO-50)? The coronavirus disease (COVID-19) is caused by a virus. It is an illness that was first found in [place], [place], in December 2019. It has since spread worldwide. The virus can cause fever, cough, and trouble breathing. In severe cases, it can cause pneumonia and make it hard to breathe without help. It can cause death. Coronaviruses are a large group of viruses. They cause the common cold. They also cause more serious illnesses like Middle East respiratory syndrome (MERS) and severe acute respiratory syndrome (SARS). COVID-19 is caused by a novel coronavirus. That means it's a new type that has not been seen in people before. This virus spreads person-to-person through droplets from coughing and sneezing. It can also spread when you are close to someone who is infected. And it can spread when you touch something that has the virus on it, such as a doorknob or a tabletop. What can you do to protect yourself from coronavirus (COVID-19)? The best way to protect yourself from getting sick is to:  · Avoid areas where there is an outbreak. · Avoid contact with people who may be infected.   · Wash your hands often with soap or alcohol-based hand sanitizers. · Avoid crowds and try to stay at least 6 feet away from other people. · Wash your hands often, especially after you cough or sneeze. Use soap and water, and scrub for at least 20 seconds. If soap and water aren't available, use an alcohol-based hand . · Avoid touching your mouth, nose, and eyes. What can you do to avoid spreading the virus to others? To help avoid spreading the virus to others:  · Cover your mouth with a tissue when you cough or sneeze. Then throw the tissue in the trash. · Use a disinfectant to clean things that you touch often. · Stay home if you are sick or have been exposed to the virus. Don't go to school, work, or public areas. And don't use public transportation. · If you are sick:  ? Leave your home only if you need to get medical care. But call the doctor's office first so they know you're coming. And wear a face mask, if you have one.  ? If you have a face mask, wear it whenever you're around other people. It can help stop the spread of the virus when you cough or sneeze. ? Clean and disinfect your home every day. Use household  and disinfectant wipes or sprays. Take special care to clean things that you grab with your hands. These include doorknobs, remote controls, phones, and handles on your refrigerator and microwave. And don't forget countertops, tabletops, bathrooms, and computer keyboards. When to call for help  Call 911 anytime you think you may need emergency care. For example, call if:  · You have severe trouble breathing. (You can't talk at all.)  · You have constant chest pain or pressure. · You are severely dizzy or lightheaded. · You are confused or can't think clearly. · Your face and lips have a blue color. · You pass out (lose consciousness) or are very hard to wake up. Call your doctor now if you develop symptoms such as:  · Shortness of breath. · Fever. · Cough.   If you need to get care, call ahead to the doctor's office for instructions before you go. Make sure you wear a face mask, if you have one, to prevent exposing other people to the virus. Where can you get the latest information? The following health organizations are tracking and studying this virus. Their websites contain the most up-to-date information. Briana Torres also learn what to do if you think you may have been exposed to the virus. · U.S. Centers for Disease Control and Prevention (CDC): The CDC provides updated news about the disease and travel advice. The website also tells you how to prevent the spread of infection. www.cdc.gov  · World Health Organization [facility]): WHO offers information about the virus outbreaks. WHO also has travel advice. www.who.int  Current as of: April 1, 2020               Content Version: 12.4  © 6399-2621 [employer]. Care instructions adapted under license by your healthcare professional. If you have questions about a medical condition or this instruction, always ask your healthcare professional. [address] any warranty or liability for your use of this information.

## 2020-05-29 NOTE — H&P
Please see clinic note  for detail. I saw and examined patient and confirmed above. No interval change. Labs reviewed. Procedure explained to patient and all risk and benefit discussed with patient. Discussed with patient the procedure detail about loop recorder implantation. Risk, benefit, necessity and alternatives were discussed in detail. Complication of procedure were also discussed with patient in detail including but not limited to bleeding, infection, need for emergent surgery, heart attack, etc.... Patient is agreeable with procedure. History and physical has been reviewed.  There have been no significant clinical changes since the completion of the originally dated History and Physical.  ------------------------------------------------------------------------------------------------------------------

## 2020-05-29 NOTE — PERIOP NOTES
Call placed to [employer] [phone] for return ride home;   Patient's niece is waiting for patient outside front door. Reservation #[id number] NBP[facility] will be here in about 30 minutes.

## 2020-05-29 NOTE — PERIOP NOTES
Phase 2 Recovery Summary    Report received from CATH Lab    Vitals:    05/29/20 1051 05/29/20 1223   BP: 138/61 123/76   Pulse: 71 75   Resp: 18    Temp: 98.2 °F (36.8 °C) 98.6 °F (37 °C)   SpO2: 99% 100%   Weight: 95.4 kg (210 lb 5 oz)    Height: 5' 9.5\" (1.765 m)        oriented to time, place, person and situation  Denies pain. Dressing to right upper chest dry and intact,  No stirke through drainage noted on bandage. Lines and Drains  Peripheral Intravenous Line:  Removed prior to discharge. Patient assisted to chair with minimal assist. Pateint tolerating liquids well. Discharge discussed with patient (no sedation). No questions or concerns at this time. Patient had time to ask any questions. Discharge medications reviewed with patient and appropriate educational materials and side effects teaching were provided. Patient discharged to home with niece via [employer].        Elizabeth Gaines RN

## 2020-05-29 NOTE — Clinical Note
TRANSFER - OUT REPORT:     Verbal report given to: Joseline Lopez RN. Report consisted of patient's Situation, Background, Assessment and   Recommendations(SBAR). Opportunity for questions and clarification was provided. Patient transported with a Cardiac Cath Tech / Patient Care Tech. Patient transported to: [facility].

## 2020-08-05 ENCOUNTER — TELEPHONE (OUTPATIENT)
Dept: ORTHOPEDIC SURGERY | Age: 70
End: 2020-08-05

## 2020-08-05 DIAGNOSIS — M16.11 ARTHRITIS OF RIGHT HIP: ICD-10-CM

## 2020-08-05 DIAGNOSIS — M25.651 DECREASED RANGE OF RIGHT HIP MOVEMENT: ICD-10-CM

## 2020-08-07 RX ORDER — DICLOFENAC SODIUM 75 MG/1
TABLET, DELAYED RELEASE ORAL
Qty: 60 TAB | Refills: 0 | Status: SHIPPED | OUTPATIENT
Start: 2020-08-07 | End: 2022-01-01

## 2020-08-07 NOTE — TELEPHONE ENCOUNTER
Contacted pt at his home number. Informed pt that prescription was sent to the pharmacy at this time.

## 2020-08-07 NOTE — TELEPHONE ENCOUNTER
Patient is out of the Diclofenac and in significant pain. They are requesting this be sent today please.

## 2020-08-13 ENCOUNTER — OFFICE VISIT (OUTPATIENT)
Dept: ORTHOPEDIC SURGERY | Facility: CLINIC | Age: 70
End: 2020-08-13

## 2020-08-13 VITALS
HEIGHT: 70 IN | SYSTOLIC BLOOD PRESSURE: 138 MMHG | RESPIRATION RATE: 15 BRPM | BODY MASS INDEX: 30.01 KG/M2 | DIASTOLIC BLOOD PRESSURE: 65 MMHG | TEMPERATURE: 96.5 F | HEART RATE: 101 BPM | WEIGHT: 209.6 LBS

## 2020-08-13 DIAGNOSIS — M16.11 ARTHRITIS OF RIGHT HIP: Primary | ICD-10-CM

## 2020-08-13 NOTE — PROGRESS NOTES
Mr. Leonides Saunders returns to the office complaining of right hip pain. He was seen previously in late January 2020. He was ordered an interarticular cortisone injection for the right hip but did not follow through on the order. He is recalcitrant to undergo a right total hip replacement. In this instance a new order for a intra-articular fluoroscopy guided right hip cortisone injection provided. Patient to follow-up PRN. Tylenol/Motrin per 's recommendations for symptom management recommended.

## 2020-08-19 ENCOUNTER — CLINICAL SUPPORT (OUTPATIENT)
Dept: CARDIOLOGY CLINIC | Age: 70
End: 2020-08-19

## 2020-08-19 DIAGNOSIS — Z95.9 CARDIAC DEVICE IN SITU: ICD-10-CM

## 2020-08-19 DIAGNOSIS — R55 SYNCOPE, UNSPECIFIED SYNCOPE TYPE: Primary | ICD-10-CM

## 2020-08-31 ENCOUNTER — TELEPHONE (OUTPATIENT)
Dept: CARDIOLOGY CLINIC | Age: 70
End: 2020-08-31

## 2020-08-31 NOTE — TELEPHONE ENCOUNTER
Incoming from pts nephew. Two patient Identifiers confirmed. Advised pt had been having sob sometimes and had an appt 09/10/2020. Apologized for the inconvience but advised there was no sooner availability. Advised nephew to contact pcp and advised pt had an appt on 09/10/2020 with us but may need to be elevated for a referral to pulmonary as well. Pts nephew stated he would contact insurance to see who was in network so they could get a referral to pulmonary. Pts nephew verbalized understanding.

## 2020-08-31 NOTE — TELEPHONE ENCOUNTER
Contacted pts family at number. Two patient Identifiers confirmed. Advised pt 02 and pulse is within normal range. pts nephew stated he put pt on 3L 02 NC. Inquired if it was rx. He stated no it was his 02 machine. Discouraged using 02 on pt without an order. Advised to contact PCP for instructions. Pts nephew  verbalized understanding.

## 2020-08-31 NOTE — TELEPHONE ENCOUNTER
Family calling to report PO2 of 96% and pulse of 100. Would like to know if they should go to ED for SOB.

## 2020-09-08 ENCOUNTER — TELEPHONE (OUTPATIENT)
Dept: FAMILY MEDICINE CLINIC | Age: 70
End: 2020-09-08

## 2020-09-08 NOTE — TELEPHONE ENCOUNTER
Dr. Cristy Decker called to report mr arndt has a firm palpable mass in RUQ. Dr. Yandel Abdi is with [facility], he is there today for an in home health risk assessment  Patient denies pain or GI symptoms, endorses decreased appetite  SOB with minimal exertion, can walk about a block  No jaundice noted, urine is clear in the morning    Appointment made for Mr. Arndt this up coming Friday in office at 2pm. Dr. Yandel Abdi will assist mr arndt in procuring transportation to the visit  Appreciate the help of Dr Yandel Abdi

## 2020-09-10 ENCOUNTER — OFFICE VISIT (OUTPATIENT)
Dept: CARDIOLOGY CLINIC | Age: 70
End: 2020-09-10

## 2020-09-10 ENCOUNTER — HOSPITAL ENCOUNTER (OUTPATIENT)
Dept: GENERAL RADIOLOGY | Age: 70
Discharge: HOME | End: 2020-09-10
Payer: MEDICARE

## 2020-09-10 VITALS
OXYGEN SATURATION: 98 % | DIASTOLIC BLOOD PRESSURE: 53 MMHG | SYSTOLIC BLOOD PRESSURE: 113 MMHG | BODY MASS INDEX: 30.81 KG/M2 | HEIGHT: 69 IN | TEMPERATURE: 98.8 F | WEIGHT: 208 LBS | HEART RATE: 75 BPM

## 2020-09-10 DIAGNOSIS — R06.02 SOB (SHORTNESS OF BREATH): Primary | ICD-10-CM

## 2020-09-10 DIAGNOSIS — R06.02 SOB (SHORTNESS OF BREATH): ICD-10-CM

## 2020-09-10 PROCEDURE — 71046 X-RAY EXAM CHEST 2 VIEWS: CPT

## 2020-09-10 NOTE — PATIENT INSTRUCTIONS
Neurology Referral    Chest x-ray      No appointment required for lab work  Hours are Mon-Fri 7:00 am-5:00 pm    All labs are completed at:  [address] [place] [address]

## 2020-09-10 NOTE — PROGRESS NOTES
[facility]    Mr. Randi Delgadillo is a 77 y.o. male with history of alcohol dependence, DJD, history of subarachnoid hemorrhage, syncope    Patient is here today for follow-up appointment. He continues to have syncopal episodes. These are not preceded by any symptoms. His loop recorder interrogation 2 weeks ago was unremarkable. He tells me that he has been feeling shortness of breath for last couple months with exertion. Does not report any chest pain or chest tightness. He was referred to neurology team however because of geographic location, he did not follow-up with that appointment. He is supposed to see primary care provider in 2 weeks for follow-up appointments  He denies any palpitation  Denies any nausea, vomiting, abdominal pain, fever, chills, sputum production. No hematuria or other bleeding complaints    Past Medical History:   Diagnosis Date    DJD (degenerative joint disease)     ETOH abuse     Eye inflamed     History of loop recorder 05/2020    Subarachnoid hemorrhage ([facility] [address] 09/2016    Syncope          Past Surgical History:   Procedure Laterality Date    COLONOSCOPY N/A 6/17/2019    COLONOSCOPY performed by Isaias Petty [facility] at [facility] ENDOSCOPY    HX CATARACT REMOVAL Left 01/24/2018    TN INSERTION SUBQ CARDIAC RHYTHM MONITOR W/PRGRMG N/A 5/29/2020    LOOP RECORDER INSERT performed by Tj Matamoros MD at [facility] CATH LAB       Current Outpatient Medications   Medication Sig    diclofenac EC (VOLTAREN) 75 mg EC tablet TAKE ONE TABLET BY MOUTH TWICE A DAY     No current facility-administered medications for this visit.         Allergies and Sensitivities:  No Known Allergies    Family History:  Family History   Problem Relation Age of Onset    Cancer Mother     Diabetes Mother     Cancer Father        Social History:  Social History     Tobacco Use    Smoking status: Current Every Day Smoker Packs/day: 0.25     Years: 45.00     Pack years: 11.25    Smokeless tobacco: Never Used   Substance Use Topics    Alcohol use: Yes     Comment: 3-4 16 oz beers daily    Drug use: No     He  reports that he has been smoking. He has a 11.25 pack-year smoking history. He has never used smokeless tobacco.  He  reports current alcohol use. Review of Systems:  Cardiac symptoms as noted above in HPI. All others negative. Denies fatigue, malaise, skin rash, joint pain, blurring vision, photophobia, neck pain, hemoptysis, chronic cough, nausea, vomiting, hematuria, burning micturition, BRBPR, chronic headaches. Physical Exam:  BP Readings from Last 3 Encounters:   09/10/20 113/53   08/13/20 138/65   05/29/20 123/76         Pulse Readings from Last 3 Encounters:   09/10/20 75   08/13/20 (!) 101   05/29/20 75          Wt Readings from Last 3 Encounters:   09/10/20 208 lb (94.3 kg)   08/13/20 209 lb 9.6 oz (95.1 kg)   05/29/20 210 lb 5 oz (95.4 kg)       Constitutional: Oriented to person, place, and time. HENT: Head: Normocephalic and atraumatic  Neck: No JVD present. Carotid bruit is not appreciated. Cardiovascular: Regular rhythm. No murmur, gallop or rubs appreciated  Lung: Breath sounds normal. No respiratory distress. No ronchi or rales appreciated  Abdominal: No tenderness. No rebound and no guarding. Musculoskeletal: There is no lower extremity edema. No cynosis      Review of Data  LABS:   Lab Results   Component Value Date/Time    Sodium 138 11/27/2019 03:18 PM    Potassium 3.9 11/27/2019 03:18 PM    Chloride 107 11/27/2019 03:18 PM    CO2 27 11/27/2019 03:18 PM    Glucose 97 11/27/2019 03:18 PM    BUN 12 11/27/2019 03:18 PM    Creatinine 1.04 11/27/2019 03:18 PM     Lipids Latest Ref Rng & Units 8/27/2019   Chol, Total <200 MG/   HDL 40 - 60 MG/DL 56   LDL 0 - 100 MG/. 2(H)   Trig <150 MG/DL 99   Chol/HDL Ratio 0 - 5.0   3.2     Lab Results   Component Value Date/Time    ALT (SGPT) 25 11/27/2019 03:18 PM     No results found for: HBA1C, HGBE8, QSZ0MSWV, MGX9DLOS, ITU0ILJG    EKG    HOLTER (12/19)  Clay Arndt was in Sinus . The average heart rate, excluding ectopy, was 87 BPM with a minimum of 60 BPM at 05:49 D2 and a maximum of 153 BPM at 19:21 D2. Heart beats, including ectopy, totaled 118799 beats. VENTRICULAR ECTOPICS totaled 5756 averaging 134. 2 per hour with 4941 single, 58 paired, 349 trigeminy and 0 R on T. BIGEMINY runs occurred  123 times and totaled 399 beats. PVC [place] 2.7%  SUPRAVENTRICULAR ECTOPICS totaled 43 averaging 1.0 per hour ,with 41 single and 2 paired beats. Patient diary was submitted, no symptoms noted. No patient triggered events noted. ECHO (01/20)  Left Ventricle Normal cavity size and wall thickness. Low normal systolic function. The calculated ejection fraction is 50%. Visually measured ejection fraction. Global hypokinesis observed. There is mild (grade 1) left ventricular diastolic dysfunction. Left Atrium Moderately dilated left atrium. Left Atrium volume index is 49 mL/m2. Right Ventricle Normal cavity size and global systolic function. Right Atrium Normal cavity size. Aortic Valve Normal valve structure, no stenosis and no regurgitation. Mitral Valve No stenosis. Mitral valve non-specific thickening. Trace regurgitation. Tricuspid Valve No stenosis. Tricuspid regurgitation is inadequate for estimation of right ventricular systolic pressure. There is no evidence of pulmonary hypertension. Pulmonic Valve Pulmonic valve not well visualized, but normal doppler findings. Aorta Normal aortic root and ascending aorta. STRESS TEST (01/20)  · Baseline ECG: Normal EKG, non-specific ST-T wave abnormalities. · Gated SPECT: Left ventricular function post-stress was abnormal. Calculated ejection fraction is 48%.   · Left ventricular perfusion is probably normal.  · Myocardial perfusion imaging defect 1: There is a defect that is small in size present in the inferior location(s) that is more pronounced with rest than stress imaging. The defect appears to probably be artifact. · There was no convincing evidence of significant reversible defect to suggest on going major ischemia. Inferior defect is most consistent with diaphragmatic attenuation artifact     CATHETERIZATION    IMPRESSION & PLAN:  Mr. Rashawn Sow is 77 y.o. male with medical problem as mentioned above    Chest pain:  No chest pain since last visit. Nuclear stress test in January 2020, low risk  Continue with risk factor modification    Cardiomyopathy:  Last EF 45-50% in 2019. Repeat echo in January 2020, EF 50%  He denies taking any medication at this time. He has no evidence of fluid overload on exam.  There is no edema or JVD    PVC:  PVC burden of 2.7% noted on Holter monitor in December 2019. Denies any symptoms. He denies any palpitation. He is not taking any medication. In the past he was supposed to be on Coreg however he is not taking any of the medications. Syncope:  Patient has been having syncopal episode, unexplained for last4 years. Echo and stress , low risk  Had 2-3 more episode of syncope since last visit and implantation of loop recorder in May 2020. We interrogated loop recorder today and patient had no event recorded tachycardia or bradycardia. I am asking him that he needs to schedule appointment with neurology team to rule out any other cause. He has appointment with PCP in 2 weeks. I will defer further management to PCP including neurology work-up. I have sent connect care communication message to PCP regarding this request.    This plan was discussed with patient who is in agreement. Thank you for allowing me to participate in patient care. Please feel free to call me if you have any question or concern. Triny Torrez MD  Please note: This document has been produced using voice recognition software.  Unrecognized errors in transcription may be present.

## 2020-09-11 ENCOUNTER — OFFICE VISIT (OUTPATIENT)
Dept: FAMILY MEDICINE CLINIC | Age: 70
End: 2020-09-11

## 2020-09-11 VITALS
BODY MASS INDEX: 30.51 KG/M2 | HEIGHT: 69 IN | DIASTOLIC BLOOD PRESSURE: 58 MMHG | SYSTOLIC BLOOD PRESSURE: 114 MMHG | HEART RATE: 106 BPM | OXYGEN SATURATION: 97 % | WEIGHT: 206 LBS | TEMPERATURE: 97.7 F | RESPIRATION RATE: 12 BRPM

## 2020-09-11 DIAGNOSIS — D64.9 ANEMIA, UNSPECIFIED TYPE: ICD-10-CM

## 2020-09-11 DIAGNOSIS — R09.02 OXYGEN DESATURATION: ICD-10-CM

## 2020-09-11 DIAGNOSIS — R19.01 RIGHT UPPER QUADRANT ABDOMINAL MASS: ICD-10-CM

## 2020-09-11 DIAGNOSIS — R06.03 RESPIRATORY DISTRESS: Primary | ICD-10-CM

## 2020-09-11 PROBLEM — H04.123 DRY EYE SYNDROME OF BILATERAL LACRIMAL GLANDS: Status: ACTIVE | Noted: 2020-09-11

## 2020-09-11 PROBLEM — H40.013 OPEN ANGLE WITH BORDERLINE FINDINGS, LOW RISK, BILATERAL: Status: ACTIVE | Noted: 2020-09-11

## 2020-09-11 PROBLEM — D62 ACUTE BLOOD LOSS ANEMIA: Status: RESOLVED | Noted: 2019-06-13 | Resolved: 2020-09-11

## 2020-09-11 RX ORDER — CARBOXYMETHYLCELLULOSE SODIUM AND GLYCERIN 5; 9 MG/ML; MG/ML
SOLUTION/ DROPS OPHTHALMIC
COMMUNITY
End: 2022-01-01

## 2020-09-11 NOTE — PROGRESS NOTES
[address] [place] [address], [facility] [phone] [id number] [address] is a 77 y.o. male and presents with Follow Up Chronic Condition (Abd Mass- Per NP Rosemary Valentin) and Shortness of Breath (x 1 mo)       Assessment/Plan:    Diagnoses and all orders for this visit:    1. Respiratory distress    2. Anemia, unspecified type    3. Oxygen desaturation    4. Right upper quadrant abdominal mass    came to the office today for evaluation of abdominal mass  He was tachypnic and sob with desaturation to 84% on ambulation  There is a palpable mass to the RUQ approx 4cm in diameter with tenderness  He was transported to the ED for further evaluation and management of his respiratory distress   Differentials: PE, severe anemia ( he has had this in the past and was followed by hematology but stopped going), pneumonia, CHF    called his wife for him and informed her he is being transported to the ED for further evaluation  1507: transported to ED via EMS    Follow-up and Dispositions    · Return if symptoms worsen or fail to improve. Subjective:    Here to day for evaluation of abd mass reported by home visit MD with his insurance  arived sob, rest pox 95%, ambulation 84%  Notable SOB with increased WOB and some accessory muscle use  He got a ride here with [employer] througth insurance    Has been feeling sob like this for about a month  Came on slowly  Denies GRAHAM holly  Has had several episodes of syncope in the past month  Last cbc was 11/2019  deneis black or bloody stools  Nose will bleed sometimes when he bends over and ties his shoes  denies n/v  Has not been taking iron regularly  Has been eating ice  Endorses burning in his chest after ambulation      ROS:     ROS  As stated in HPI, otherwise all others negative. The problem list was updated as a part of today's visit.   Patient Active Problem List   Diagnosis Code    Syncope R55    Anemia D64.9    Frequent PVCs I49.3    Dry eye syndrome of bilateral lacrimal glands H04.123    Open angle with borderline findings, low risk, bilateral H40.013       The PSH, FH were reviewed. SH:  Social History     Tobacco Use    Smoking status: Current Every Day Smoker     Packs/day: 0.25     Years: 45.00     Pack years: 11.25    Smokeless tobacco: Never Used   Substance Use Topics    Alcohol use: Yes     Comment: 3-4 16 oz beers daily    Drug use: No       Medications/Allergies:  Current Outpatient Medications on File Prior to Visit   Medication Sig Dispense Refill    Carboxymethylcellulose-Glycern (Refresh Optive) 0.5-0.9 % drop       diclofenac EC (VOLTAREN) 75 mg EC tablet TAKE ONE TABLET BY MOUTH TWICE A DAY 60 Tab 0     No current facility-administered medications on file prior to visit. No Known Allergies    Objective:  Visit Vitals  BP (!) 114/58   Pulse (!) 106   Temp 97.7 °F (36.5 °C) (Oral)   Resp 12   Ht 5' 9\" (1.753 m)   Wt 206 lb (93.4 kg)   SpO2 97%   BMI 30.42 kg/m²    Body mass index is 30.42 kg/m². Physical assessment  Physical Exam  Vitals signs and nursing note reviewed. Constitutional:       Appearance: He is ill-appearing. HENT:      Head: Normocephalic and atraumatic. Cardiovascular:      Rate and Rhythm: Regular rhythm. Tachycardia present. Heart sounds: Normal heart sounds. Pulmonary:      Effort: Tachypnea, accessory muscle usage and respiratory distress present. Breath sounds: Normal breath sounds. Abdominal:      General: Bowel sounds are normal.      Palpations: There is mass. Tenderness: There is abdominal tenderness in the right upper quadrant. Musculoskeletal:      Right lower le+ Pitting Edema present. Left lower le+ Pitting Edema present. Skin:     General: Skin is warm and dry. Neurological:      Mental Status: He is alert. Psychiatric:         Behavior: Behavior is cooperative.            Labwork and Ancillary Studies:    CBC w/Diff  Lab Results   Component Value Date/Time    WBC 4.3 (L) 11/27/2019 03:18 PM    HGB 9.8 (L) 11/27/2019 03:18 PM    PLATELET 738 23/39/4013 03:18 PM         Basic Metabolic Profile  Lab Results   Component Value Date/Time    Sodium 138 11/27/2019 03:18 PM    Potassium 3.9 11/27/2019 03:18 PM    Chloride 107 11/27/2019 03:18 PM    CO2 27 11/27/2019 03:18 PM    Anion gap 4 11/27/2019 03:18 PM    Glucose 97 11/27/2019 03:18 PM    BUN 12 11/27/2019 03:18 PM    Creatinine 1.04 11/27/2019 03:18 PM    BUN/Creatinine ratio 12 11/27/2019 03:18 PM    GFR est AA >60 11/27/2019 03:18 PM    GFR est non-AA >60 11/27/2019 03:18 PM    Calcium 9.5 11/27/2019 03:18 PM        Cholesterol  Lab Results   Component Value Date/Time    Cholesterol, total 179 08/27/2019 03:53 PM    HDL Cholesterol 56 08/27/2019 03:53 PM    LDL, calculated 103.2 (H) 08/27/2019 03:53 PM    Triglyceride 99 08/27/2019 03:53 PM    CHOL/HDL Ratio 3.2 08/27/2019 03:53 PM       Health Maintenance:   Health Maintenance   Topic Date Due    DTaP/Tdap/Td series (1 - Tdap) 12/30/1972    Shingrix Vaccine Age 50> (1 of 2) 12/30/2001    FOBT Q1Y Age 50-75  12/30/2001    GLAUCOMA SCREENING Q2Y  12/30/2016    AAA Screening 73-69 YO Male Smoking Patients  12/30/2016    Pneumococcal 65+ years (1 of 1 - PPSV23) 12/30/2016    Flu Vaccine (1) 09/01/2020    Medicare Yearly Exam  08/27/2020    Lipid Screen  08/27/2024    Hepatitis C Screening  Completed       I have discussed the diagnosis with the patient and the intended plan as seen in the above orders. The patient has received an After-Visit Summary and questions were answered concerning future plans. An After Visit Summary was printed and given to the patient. All diagnosis have been discussed with the patient and all of the patient's questions have been answered. Follow-up and Dispositions    · Return if symptoms worsen or fail to improve.            Mallory Claire, [facility] AND [facility] [address] [facility] [address] [facility].  [phone]

## 2020-09-11 NOTE — PROGRESS NOTES
Chief Complaint   Patient presents with    Follow Up Chronic Condition     Abd Mass- Per NP Valentine    Shortness of Breath     x 1 mo         1. Have you been to the ER, urgent care clinic since your last visit? Hospitalized since your last visit? No    2. Have you seen or consulted any other health care providers outside of the [facility] since your last visit? Include any pap smears or colon screening.  Cardiology

## 2020-09-18 ENCOUNTER — TELEPHONE (OUTPATIENT)
Dept: FAMILY MEDICINE CLINIC | Age: 70
End: 2020-09-18

## 2020-09-18 DIAGNOSIS — R19.01 RIGHT UPPER QUADRANT ABDOMINAL MASS: Primary | ICD-10-CM

## 2020-09-18 NOTE — TELEPHONE ENCOUNTER
Reached out to Mr. Ervin to follow up on his hospitalization. [phone]  Reached his care coordinator taina schaffer. Reviewed with her his recent hospitalization and his need for a ct scan. Anyi Bar stated she will call depaul to schedule the scan and arrange transportation for mr ervin. Called mr ervin at [phone]  Mrs Guilherme Corcoran with [facility] wants me to call her, [phone]  States he is feeling well. Understands he is going for a cat scan and taina is going to call him with the information and help him get there. He is aware he needs a follow up appt with GI and another provider, I do not knjow his specialty. States he will get taina to help him with these appointments    He will come in to the office on 10/6 at 345 for appt with me. All diagnosis have been discussed with the patient and all of the patient's questions have been answered.

## 2020-09-25 ENCOUNTER — HOSPITAL ENCOUNTER (OUTPATIENT)
Dept: CT IMAGING | Age: 70
Discharge: HOME | End: 2020-09-25
Attending: NURSE PRACTITIONER
Payer: MEDICARE

## 2020-09-25 DIAGNOSIS — R19.01 RIGHT UPPER QUADRANT ABDOMINAL MASS: ICD-10-CM

## 2020-09-25 LAB — CREAT UR-MCNC: 0.9 MG/DL (ref 0.6–1.3)

## 2020-09-25 PROCEDURE — 82565 ASSAY OF CREATININE: CPT

## 2020-09-25 PROCEDURE — 74011000255 HC RX REV CODE- 255: Performed by: NURSE PRACTITIONER

## 2020-09-25 PROCEDURE — 74011000636 HC RX REV CODE- 636: Performed by: NURSE PRACTITIONER

## 2020-09-25 PROCEDURE — 74177 CT ABD & PELVIS W/CONTRAST: CPT

## 2020-09-25 RX ORDER — BARIUM SULFATE 20 MG/ML
900 SUSPENSION ORAL
Status: COMPLETED | OUTPATIENT
Start: 2020-09-25 | End: 2020-09-25

## 2020-09-25 RX ADMIN — IOPAMIDOL 90 ML: 612 INJECTION, SOLUTION INTRAVENOUS at 08:05

## 2020-09-25 RX ADMIN — BARIUM SULFATE 900 ML: 20 SUSPENSION ORAL at 08:03

## 2020-10-06 ENCOUNTER — OFFICE VISIT (OUTPATIENT)
Dept: FAMILY MEDICINE CLINIC | Age: 70
End: 2020-10-06
Payer: MEDICARE

## 2020-10-06 VITALS
RESPIRATION RATE: 16 BRPM | TEMPERATURE: 99 F | BODY MASS INDEX: 30.51 KG/M2 | HEIGHT: 69 IN | OXYGEN SATURATION: 96 % | DIASTOLIC BLOOD PRESSURE: 78 MMHG | SYSTOLIC BLOOD PRESSURE: 146 MMHG | HEART RATE: 100 BPM | WEIGHT: 206 LBS

## 2020-10-06 DIAGNOSIS — E55.9 VITAMIN D DEFICIENCY: ICD-10-CM

## 2020-10-06 DIAGNOSIS — K70.30 ALCOHOLIC CIRRHOSIS OF LIVER WITHOUT ASCITES (HCC): ICD-10-CM

## 2020-10-06 DIAGNOSIS — D50.0 IRON DEFICIENCY ANEMIA DUE TO CHRONIC BLOOD LOSS: Primary | ICD-10-CM

## 2020-10-06 DIAGNOSIS — D18.03 HEMANGIOMA OF INTRA-ABDOMINAL STRUCTURE: ICD-10-CM

## 2020-10-06 DIAGNOSIS — E78.00 ELEVATED LDL CHOLESTEROL LEVEL: ICD-10-CM

## 2020-10-06 PROCEDURE — 3017F COLORECTAL CA SCREEN DOC REV: CPT | Performed by: NURSE PRACTITIONER

## 2020-10-06 PROCEDURE — G8536 NO DOC ELDER MAL SCRN: HCPCS | Performed by: NURSE PRACTITIONER

## 2020-10-06 PROCEDURE — G8417 CALC BMI ABV UP PARAM F/U: HCPCS | Performed by: NURSE PRACTITIONER

## 2020-10-06 PROCEDURE — G8432 DEP SCR NOT DOC, RNG: HCPCS | Performed by: NURSE PRACTITIONER

## 2020-10-06 PROCEDURE — 1101F PT FALLS ASSESS-DOCD LE1/YR: CPT | Performed by: NURSE PRACTITIONER

## 2020-10-06 PROCEDURE — G8427 DOCREV CUR MEDS BY ELIG CLIN: HCPCS | Performed by: NURSE PRACTITIONER

## 2020-10-06 PROCEDURE — 99214 OFFICE O/P EST MOD 30 MIN: CPT | Performed by: NURSE PRACTITIONER

## 2020-10-06 RX ORDER — OMEPRAZOLE 20 MG/1
40 TABLET, DELAYED RELEASE ORAL DAILY
COMMUNITY
Start: 2020-09-15 | End: 2022-01-01

## 2020-10-06 RX ORDER — FUROSEMIDE 20 MG/1
20 TABLET ORAL DAILY
COMMUNITY
Start: 2020-09-15 | End: 2022-01-01

## 2020-10-06 RX ORDER — RAMIPRIL 1.25 MG/1
1.25 CAPSULE ORAL DAILY
COMMUNITY
Start: 2020-09-16 | End: 2022-01-01

## 2020-10-06 NOTE — PROGRESS NOTES
[address] [place] [address]               [id number] [address] is a 77 y.o. male and presents with Follow-up       Assessment/Plan:    Diagnoses and all orders for this visit:    1. Iron deficiency anemia due to chronic blood loss  -     CBC W/O DIFF; Future  -     IRON PROFILE; Future  -     FERRITIN; Future  Follow-up on labs to evaluate his current hemoglobin and hematocrit post hospitalization  He has an upper endoscopy scheduled for 10/28/2020 with Dr. Seamus Torres at [address] [place] [address]  Mr. Marco Levin did not know why he was going for this procedure, during this office visit I called Dr. Seamus Torres and he clarified for me that the repeat upper endoscopy is to evaluate his esophageal varices and the bands that were placed during his hospitalization    2. Hemangioma of intra-abdominal structure  During the call with Dr. Seamus Torres I informed him of the CAT scan results which was performed secondary to a mass being palpated on Mr. Arndt right upper quadrant of his abdomen  Dr. Seamus Torres stated that gastroenterology will follow up on this    3. Elevated LDL cholesterol level  -     LIPID PANEL; Future  Repeat lipid levels    4. Alcoholic cirrhosis of liver without ascites (HCC)  -     METABOLIC PANEL, COMPREHENSIVE; Future  Follow-up hepatic and renal function post hospitalization    5. Vitamin D deficiency  -     VITAMIN D, 25 HYDROXY; Future  Follow-up vitamin D level    By the time this office visit was completed our in office lab was closed, Mr. Heath Be care coordinator Matthew Kim stated that she would assist him in making sure that these labs get drawn in a timely manner    Follow-up and Dispositions    · Return in about 3 months (around 1/6/2021) for anemia, syncope , 30 min, office only. Subjective:  Mr. Marco Levin care coordinator Matthew Kim was present during this office visit      Hospitalization summary;   Last office visit with me on 9/11/2020 Mr. Willow Mcgrath was severely short of breath using accessory muscles and desaturated with ambulation. Subsequently, EMS was called and he was transferred over to [facility] where he was admitted for anemia. On admission he was found to have a hemoglobin of 3.3. During his hospitalization he had an upper endoscopy which showed varices with a red sign treated with banding and a 2 cm submucosal nodule in the duodenum with active bleeding. The nodule was treated with 2 mL of dilute epinephrine and 2 hemoclips with good control of the bleeding. He also had a colonoscopy performed which showed hemorrhoids but otherwise normal.  He received a total of 5 units of packed RBCs. He was diagnosed with acute systolic CHF, this is a new diagnosis he was given Lasix and started on a low-dose ACE inhibitor. Test results:  See hospitalization summary    Date admitted: 9/11/2020    Date discharged: 9/15/2020    Date of face to face: 10/6/2020    Medication reconciliation performed: Yes    Medications added/changed/discontinued: Lasix 20 mg daily, Prilosec 20 mg daily and ramipril 1.25 mg daily    Review discharge instructions: Yes    Need for follow up on in hospital testing: Yes, needs a repeat EGD in 4 weeks post hospitalization    Need for follow up with specialist: Yes  Name of specialist: Cardiology and gastroenterology    Does patient have help at home: Yes  Name: His wife    Barriers to obtaining medications: No    Patient/family educated on cause of hospitalization: Yes    Patient/family able to repeat back cause of hospitalization, disease process, medication changes, and when to seek help: Yes    Patient Past Records were reviewed:  yes    ROS:     ROS  As stated in HPI, otherwise all others negative. The problem list was updated as a part of today's visit.   Patient Active Problem List   Diagnosis Code    Syncope R55    Anemia D64.9    Frequent PVCs I49.3    Dry eye syndrome of bilateral lacrimal glands H04.123    Open angle with borderline findings, low risk, bilateral Y43.309    Alcoholic cirrhosis of liver without ascites (HCC) K70.30    Hemangioma of intra-abdominal structure D18.03    Elevated LDL cholesterol level E78.00       The PSH, FH were reviewed. SH:  Social History     Tobacco Use    Smoking status: Current Every Day Smoker     Packs/day: 0.25     Years: 45.00     Pack years: 11.25    Smokeless tobacco: Never Used   Substance Use Topics    Alcohol use: Yes     Comment: 3-4 16 oz beers daily    Drug use: No       Medications/Allergies:  Current Outpatient Medications on File Prior to Visit   Medication Sig Dispense Refill    furosemide (LASIX) 20 mg tablet Take 20 mg by mouth daily.  omeprazole (PRILOSEC OTC) 20 mg tablet Take 40 mg by mouth daily.  ramipriL (ALTACE) 1.25 mg capsule Take 1.25 mg by mouth daily.  Carboxymethylcellulose-Glycern (Refresh Optive) 0.5-0.9 % drop       diclofenac EC (VOLTAREN) 75 mg EC tablet TAKE ONE TABLET BY MOUTH TWICE A DAY 60 Tab 0     No current facility-administered medications on file prior to visit. No Known Allergies    Objective:  Visit Vitals  BP (!) 146/78   Pulse 100   Temp 99 °F (37.2 °C) (Oral)   Resp 16   Ht 5' 9\" (1.753 m)   Wt 206 lb (93.4 kg)   SpO2 96%   BMI 30.42 kg/m²    Body mass index is 30.42 kg/m². Physical assessment  Physical Exam  Vitals signs and nursing note reviewed. HENT:      Head: Normocephalic and atraumatic. Cardiovascular:      Rate and Rhythm: Normal rate and regular rhythm. Heart sounds: Normal heart sounds. Pulmonary:      Effort: Pulmonary effort is normal.      Breath sounds: Normal breath sounds. Skin:     General: Skin is warm and dry. Neurological:      Mental Status: He is alert and oriented to person, place, and time. Gait: Gait is intact.            Labwork and Ancillary Studies:    CBC w/Diff  Lab Results   Component Value Date/Time    WBC 4.3 (L) 11/27/2019 03:18 PM    HGB 9.8 (L) 11/27/2019 03:18 PM    PLATELET 049 14/35/1095 03:18 PM         Basic Metabolic Profile  Lab Results   Component Value Date/Time    Sodium 138 11/27/2019 03:18 PM    Potassium 3.9 11/27/2019 03:18 PM    Chloride 107 11/27/2019 03:18 PM    CO2 27 11/27/2019 03:18 PM    Anion gap 4 11/27/2019 03:18 PM    Glucose 97 11/27/2019 03:18 PM    BUN 12 11/27/2019 03:18 PM    Creatinine 1.04 11/27/2019 03:18 PM    BUN/Creatinine ratio 12 11/27/2019 03:18 PM    GFR est AA >60 11/27/2019 03:18 PM    GFR est non-AA >60 11/27/2019 03:18 PM    Calcium 9.5 11/27/2019 03:18 PM        Cholesterol  Lab Results   Component Value Date/Time    Cholesterol, total 179 08/27/2019 03:53 PM    HDL Cholesterol 56 08/27/2019 03:53 PM    LDL, calculated 103.2 (H) 08/27/2019 03:53 PM    Triglyceride 99 08/27/2019 03:53 PM    CHOL/HDL Ratio 3.2 08/27/2019 03:53 PM       Health Maintenance:   Health Maintenance   Topic Date Due    DTaP/Tdap/Td series (1 - Tdap) 12/30/1972    Shingrix Vaccine Age 50> (1 of 2) 12/30/2001    FOBT Q1Y Age 50-75  12/30/2001    GLAUCOMA SCREENING Q2Y  12/30/2016    AAA Screening 73-69 YO Male Smoking Patients  12/30/2016    Pneumococcal 65+ years (1 of 1 - PPSV23) 12/30/2016    Medicare Yearly Exam  08/27/2020    Flu Vaccine (1) 09/01/2020    Lipid Screen  08/27/2024    Hepatitis C Screening  Completed       I have discussed the diagnosis with the patient and the intended plan as seen in the above orders. The patient has received an After-Visit Summary and questions were answered concerning future plans. An After Visit Summary was printed and given to the patient. All diagnosis have been discussed with the patient and all of the patient's questions have been answered. Follow-up and Dispositions    · Return in about 3 months (around 1/6/2021) for anemia, syncope , 30 min, office only.            SILVIA Carb[facility]alP[facility] [address] [facility] Kunal Medina [address]  [address]

## 2020-10-06 NOTE — PATIENT INSTRUCTIONS
10/28: going to [address] [facility] for another look at your esophagus and the varices. This is with Dr. Emili Prather. Call Dr Emili Prather office at [phone] and make an appointment. Dr. Luis Barron should know about your cat scan results. Dr Luis Barron is a gastroenterologist or stomach doctor. Call Dr. Armando Estrada and see if he would like to see you sooner than March 2021 because of your hospitalization.

## 2020-10-23 ENCOUNTER — APPOINTMENT (OUTPATIENT)
Dept: GENERAL RADIOLOGY | Age: 70
End: 2020-10-23
Attending: EMERGENCY MEDICINE
Payer: MEDICARE

## 2020-10-23 ENCOUNTER — HOSPITAL ENCOUNTER (EMERGENCY)
Age: 70
Discharge: HOME | End: 2020-10-23
Attending: EMERGENCY MEDICINE
Payer: MEDICARE

## 2020-10-23 VITALS
WEIGHT: 204 LBS | OXYGEN SATURATION: 99 % | DIASTOLIC BLOOD PRESSURE: 98 MMHG | RESPIRATION RATE: 20 BRPM | BODY MASS INDEX: 30.13 KG/M2 | SYSTOLIC BLOOD PRESSURE: 153 MMHG | HEART RATE: 84 BPM | TEMPERATURE: 97.6 F

## 2020-10-23 DIAGNOSIS — S20.212A CONTUSION OF RIB ON LEFT SIDE, INITIAL ENCOUNTER: Primary | ICD-10-CM

## 2020-10-23 PROCEDURE — 96372 THER/PROPH/DIAG INJ SC/IM: CPT

## 2020-10-23 PROCEDURE — 74011250636 HC RX REV CODE- 250/636: Performed by: EMERGENCY MEDICINE

## 2020-10-23 PROCEDURE — 99284 EMERGENCY DEPT VISIT MOD MDM: CPT

## 2020-10-23 PROCEDURE — 71101 X-RAY EXAM UNILAT RIBS/CHEST: CPT

## 2020-10-23 RX ORDER — LIDOCAINE 50 MG/G
PATCH TOPICAL
Qty: 1 EACH | Refills: 0 | Status: SHIPPED | OUTPATIENT
Start: 2020-10-23 | End: 2020-10-23 | Stop reason: SDUPTHER

## 2020-10-23 RX ORDER — LIDOCAINE 50 MG/G
PATCH TOPICAL
Qty: 5 EACH | Refills: 0 | Status: SHIPPED | OUTPATIENT
Start: 2020-10-23 | End: 2020-10-30 | Stop reason: SDUPTHER

## 2020-10-23 RX ORDER — KETOROLAC TROMETHAMINE 30 MG/ML
30 INJECTION, SOLUTION INTRAMUSCULAR; INTRAVENOUS
Status: COMPLETED | OUTPATIENT
Start: 2020-10-23 | End: 2020-10-23

## 2020-10-23 RX ADMIN — KETOROLAC TROMETHAMINE 30 MG: 30 INJECTION, SOLUTION INTRAMUSCULAR at 02:03

## 2020-10-23 NOTE — DISCHARGE INSTRUCTIONS
Patient Education        Bruised Rib: Care Instructions  Overview     You can get a bruised rib if you fall or get hit, such as in an accident or while playing sports. The medical term for a bruise is \"contusion. \" Small blood vessels get torn and leak blood under the skin. Most people think of a bruise as a black-and-blue area. But bones and muscles can also get bruised. An injury may damage the rib but not cause a bruise that you can see. Sometimes it can be hard to tell if a rib is bruised or broken. The symptoms may be the same. And a broken bone can't always be seen on an X-ray. But the treatment for a bruised rib is often the same as treatment for a broken one. An injury to the ribs can cause pain. The pain may be worse when you breathe deeply, cough, or sneeze. In most cases, a bruised rib will heal on its own. You can take pain medicine while the rib mends. Pain relief allows you to take deep breaths. Follow-up care is a key part of your treatment and safety. Be sure to make and go to all appointments, and call your doctor if you are having problems. It's also a good idea to know your test results and keep a list of the medicines you take. How can you care for yourself at home? · Rest and protect the injured or sore area. Stop, change, or take a break from any activity that causes pain. · Put ice or a cold pack on the area for 10 to 20 minutes at a time. Put a thin cloth between the ice and your skin. · After 2 or 3 days, if your swelling is gone, put a heating pad set on low or a warm cloth on your chest. Some doctors suggest that you go back and forth between hot and cold. Put a thin cloth between the heating pad and your skin. · Ask your doctor if you can take an over-the-counter pain medicine, such as acetaminophen (Tylenol), ibuprofen (Advil, Motrin), or naproxen (Aleve). Be safe with medicines. Read and follow all instructions on the label.   · As your pain gets better, slowly return to your normal activities. Be patient. Rib bruises can take weeks or months to heal. If the pain gets worse, it may be a sign that you need to rest a while longer. When should you call for help? Call 911 anytime you think you may need emergency care. For example, call if:    · You have severe trouble breathing. Call your doctor now or seek immediate medical care if:    · You have trouble breathing.     · You have a fever.     · You have a new or worse cough.     · You have new or worse pain. Watch closely for changes in your health, and be sure to contact your doctor if:    · You do not get better as expected. Where can you learn more? Go to http://www.gray.com/  Enter R125 in the search box to learn more about \"Bruised Rib: Care Instructions. \"  Current as of: June 26, 2019               Content Version: 12.6  © 3904-0807 [employer]. Care instructions adapted under license by [employer] (which disclaims liability or warranty for this information). If you have questions about a medical condition or this instruction, always ask your healthcare professional. [address] any warranty or liability for your use of this information.

## 2020-10-23 NOTE — ED PROVIDER NOTES
Patient is a 67-year-old male presents the emergency department today after an apparent fall. He reports he fell several hours ago. Reports that he hit his left side on the ground and the pain started after he fell. He denies any difficulty with breathing. Denies any flank pain the pain is more along the left sided costal margin. Reports that he has chronic issues with falling for the past 5 years. He has abrasions to the dorsum of the left hand but no complains of underlying pain. Denies any head trauma, denies any loss of consciousness. Reports that he drinks alcohol regularly but only about 2 or 3 beers per day. Pain worsens with palpation, inspiration and movement. Improves with rest.  Patient acknowledges that he has had multiple episodes of syncope in the past 5 years, with no cause elicited. He however feels that this was likely that he tripped on something and fell instead of passed out. He denies any head trauma, does not believe he lost consciousness, no neck or back pain. Complains only of left-sided chest wall pain which he first noticed immediately after the fall.            Past Medical History:   Diagnosis Date    DJD (degenerative joint disease)     ETOH abuse     Eye inflamed     History of loop recorder 05/2020    Subarachnoid hemorrhage ([facility] [address] 09/2016    Syncope        Past Surgical History:   Procedure Laterality Date    COLONOSCOPY N/A 6/17/2019    COLONOSCOPY performed by [facility]bo Titus [facility] at [facility] ENDOSCOPY    HX CATARACT REMOVAL Left 01/24/2018    HI INSERTION SUBQ CARDIAC RHYTHM MONITOR W/PRGRMG N/A 5/29/2020    LOOP RECORDER INSERT performed by Allie Lawrence MD at [facility] CATH LAB         Family History:   Problem Relation Age of Onset    Cancer Mother     Diabetes Mother     Cancer Father        Social History     Socioeconomic History    Marital status:      Spouse name: Not on file    Number of children: Not on file    Years of education: Not on file    Highest education level: Not on file   Occupational History    Not on file   Social Needs    Financial resource strain: Not on file    Food insecurity     Worry: Not on file     Inability: Not on file    Transportation needs     Medical: Not on file     Non-medical: Not on file   Tobacco Use    Smoking status: Current Every Day Smoker     Packs/day: 0.25     Years: 45.00     Pack years: 11.25    Smokeless tobacco: Never Used   Substance and Sexual Activity    Alcohol use: Yes     Comment: 3-4 16 oz beers daily    Drug use: No    Sexual activity: Not on file   Lifestyle    Physical activity     Days per week: Not on file     Minutes per session: Not on file    Stress: Not on file   Relationships    Social connections     Talks on phone: Not on file     Gets together: Not on file     Attends Buddhist service: Not on file     Active member of club or organization: Not on file     Attends meetings of clubs or organizations: Not on file     Relationship status: Not on file    Intimate partner violence     Fear of current or ex partner: Not on file     Emotionally abused: Not on file     Physically abused: Not on file     Forced sexual activity: Not on file   Other Topics Concern    Not on file   Social History Narrative    Not on file         ALLERGIES: Patient has no known allergies. Review of Systems   Constitutional: Negative for chills and fever. HENT: Negative for congestion. Eyes: Negative for visual disturbance. Respiratory: Negative for shortness of breath. Cardiovascular: Negative for leg swelling. Chest pain: L chest wall since fall. Gastrointestinal: Negative for abdominal pain and nausea. Genitourinary: Negative for dysuria, frequency and urgency. Musculoskeletal: Negative for back pain and neck pain. Skin: Negative for rash. Neurological: Positive for syncope (\"hundreds of times\"). Negative for headaches.        Vitals:    10/23/20 0031   Pulse: 84   Resp: 20 Temp: 97.6 °F (36.4 °C)   SpO2: 99%   Weight: 92.5 kg (204 lb)            Physical Exam  Constitutional:       General: He is not in acute distress. Appearance: Normal appearance. He is normal weight. He is not ill-appearing. Comments: Sleeping comfortably   HENT:      Head: Normocephalic. Right Ear: External ear normal.      Left Ear: External ear normal.      Nose: Nose normal. No congestion. Mouth/Throat:      Mouth: Mucous membranes are moist.      Pharynx: No oropharyngeal exudate or posterior oropharyngeal erythema. Eyes:      Conjunctiva/sclera: Conjunctivae normal.      Pupils: Pupils are equal, round, and reactive to light. Neck:      Musculoskeletal: Normal range of motion and neck supple. No neck rigidity or muscular tenderness. Comments: No midline cervical, thoracic or lumbar spinal tenderness. Cardiovascular:      Rate and Rhythm: Normal rate and regular rhythm. Pulses: Normal pulses. Heart sounds: Normal heart sounds. No murmur. Pulmonary:      Effort: Pulmonary effort is normal.      Breath sounds: Normal breath sounds. No wheezing, rhonchi or rales. Abdominal:      General: Abdomen is flat. Tenderness: There is no abdominal tenderness. There is no guarding or rebound. Musculoskeletal: Normal range of motion. General: Tenderness (Left-sided chest wall tenderness on palpation.) present. No swelling. Right lower leg: No edema. Left lower leg: No edema. Skin:     General: Skin is warm and dry. Findings: No rash. Neurological:      General: No focal deficit present. Mental Status: He is alert. XR Ribs L: My read under ED Course  MDM  Number of Diagnoses or Management Options  Contusion of rib on left side, initial encounter:   Diagnosis management comments: 81-year-old male presented to emergency department today after an apparent fall.   Complains chiefly of pain in the left chest wall, in contrast to the triage note does not appear to be located in the flank. The fall actually occurred several hours ago and reports the pain was progressive overnight and he was unable to sleep. On exam he has left-sided chest wall tenderness. Differential includes pneumothorax, rib fracture, contusion, splenic laceration, zoster, etc.    By my read I do not see any apparent rib fractures on the x-ray. Patient was given Toradol and I will give him lidocaine patches as well for home use. I do not appreciate pneumothorax on x-ray either. Patient indicates this was a likely trip and fall scenario I don't feel there to be any need to do  any additional work-up at this time. He does drink quite regularly although reports he has cut back in recent years. I can certainly contribute to his falling and I would be hesitant to give him additional medications that may cloud his sensorium and ambulatory abilities. Patient advised to that read of the x-ray was preliminary and if any additional findings were found on the x-ray he would be contacted if significant. Patient will follow up with primary care doctor and return if worse in the meantime. Patient expressed understanding all questions were answered. Patient stable for discharge home. ED Course as of Oct 23 1050   Fri Oct 23, 2020   0310 X-ray ribs left and PA chest: My read no left-sided rib fractures, no pneumothorax or consolidation. Old healing right-sided rib fractures. Metallic foreign body demonstrated on prior film in the left chest wall. [DAVID]   [id number] Patient reassessed, found to be sleeping comfortably on stretcher in no distress. [DAVID]      ED Course User Index  [DAVID] Ofelia Oseguera, DO       Procedures    Diagnosis:   1.  Contusion of rib on left side, initial encounter          Disposition: Discharge    Follow-up Information     Follow up With Specialties Details Why Contact Info    Abril Dick NP Nurse Practitioner In 1 day  [id number]      [facility] EMERGENCY DEPT Emergency Medicine  As needed, If symptoms worsen [id number]  [phone]          Discharge Medication List as of 10/23/2020  5:01 AM      CONTINUE these medications which have CHANGED    Details   lidocaine (Lidoderm) 5 % Apply patch to the affected area for 12 hours a day and remove for 12 hours a day., Normal, Disp-5 Each,R-0         CONTINUE these medications which have NOT CHANGED    Details   furosemide (LASIX) 20 mg tablet Take 20 mg by mouth daily. , Historical Med      omeprazole (PRILOSEC OTC) 20 mg tablet Take 40 mg by mouth daily. , Historical Med      ramipriL (ALTACE) 1.25 mg capsule Take 1.25 mg by mouth daily. , Historical Med      Carboxymethylcellulose-Glycern (Refresh Optive) 0.5-0.9 % drop Historical Med      diclofenac EC (VOLTAREN) 75 mg EC tablet TAKE ONE TABLET BY MOUTH TWICE A DAY, Normal, Disp-60 Tab,R-0

## 2020-10-23 NOTE — ED NOTES
Discharge information reviewed with patient, patient verbalized understanding. Patient in no distress and wheeled to lobby to wait for ride home.

## 2020-10-30 ENCOUNTER — PATIENT OUTREACH (OUTPATIENT)
Dept: CASE MANAGEMENT | Age: 70
End: 2020-10-30

## 2020-10-30 ENCOUNTER — TELEPHONE (OUTPATIENT)
Dept: FAMILY MEDICINE CLINIC | Age: 70
End: 2020-10-30

## 2020-11-02 RX ORDER — LIDOCAINE 50 MG/G
PATCH TOPICAL
Qty: 5 EACH | Refills: 0 | Status: SHIPPED | OUTPATIENT
Start: 2020-11-02 | End: 2022-01-01

## 2020-11-09 ENCOUNTER — TELEPHONE (OUTPATIENT)
Dept: FAMILY MEDICINE CLINIC | Age: 70
End: 2020-11-09

## 2021-01-01 ENCOUNTER — OFFICE VISIT (OUTPATIENT)
Dept: CARDIOLOGY CLINIC | Age: 71
End: 2021-01-01
Payer: MEDICARE

## 2021-01-01 ENCOUNTER — CLINICAL SUPPORT (OUTPATIENT)
Dept: CARDIOLOGY CLINIC | Age: 71
End: 2021-01-01
Payer: MEDICARE

## 2021-01-01 DIAGNOSIS — Z95.9 CARDIAC DEVICE IN SITU: Primary | ICD-10-CM

## 2021-01-01 DIAGNOSIS — R55 SYNCOPE, UNSPECIFIED SYNCOPE TYPE: ICD-10-CM

## 2021-01-01 DIAGNOSIS — I49.3 FREQUENT PVCS: ICD-10-CM

## 2021-01-01 PROCEDURE — 93298 REM INTERROG DEV EVAL SCRMS: CPT | Performed by: INTERNAL MEDICINE

## 2021-01-01 PROCEDURE — 93295 DEV INTERROG REMOTE 1/2/MLT: CPT | Performed by: INTERNAL MEDICINE

## 2021-04-12 NOTE — PROGRESS NOTES
I have personally seen and evaluated the device findings. Interrogation reviewed and I agree with assessment.     Ofelia Salvador

## 2021-05-05 NOTE — PROGRESS NOTES
I have personally seen and evaluated the device findings. Interrogation reviewed and I agree with assessment.     Irma Rogers

## 2021-05-19 ENCOUNTER — TELEPHONE (OUTPATIENT)
Dept: CARDIOLOGY CLINIC | Age: 71
End: 2021-05-19

## 2021-05-19 ENCOUNTER — OFFICE VISIT (OUTPATIENT)
Dept: CARDIOLOGY CLINIC | Age: 71
End: 2021-05-19
Payer: MEDICARE

## 2021-05-19 DIAGNOSIS — R55 SYNCOPE, UNSPECIFIED SYNCOPE TYPE: Primary | ICD-10-CM

## 2021-05-19 DIAGNOSIS — Z95.9 CARDIAC DEVICE IN SITU: ICD-10-CM

## 2021-05-19 PROCEDURE — 93298 REM INTERROG DEV EVAL SCRMS: CPT | Performed by: INTERNAL MEDICINE

## 2021-05-19 NOTE — TELEPHONE ENCOUNTER
Called and spoke to wife. Patient needs a follow up with Dr. Jada Aguilar. Patient and spouse do not live in same residence due to health reasons. Home number under spouses contact is GABRIEL, spoke to her on her cell phone. She stated that she would try to reach him and have him contact me. Gave wife my direct line. Called and spoke with patients  Coordinator. She gave me a number of [phone] that is also not accepting phone calls at this time.

## 2021-05-20 NOTE — PROGRESS NOTES
I have personally seen and evaluated the device findings. Interrogation reviewed and I agree with assessment.     Yani Bailey

## 2021-07-16 NOTE — PROGRESS NOTES
Kirti Rose presents today for   Chief Complaint   Patient presents with    Follow-up       Kirti Rose preferred language for health care discussion is english/other. Is someone accompanying this pt? no    Is the patient using any DME equipment during [address]? no    Depression Screening:  3 most recent PHQ Screens 9/10/2020   PHQ Not Done -   Little interest or pleasure in doing things Not at all   Feeling down, depressed, irritable, or hopeless Not at all   Total Score PHQ 2 0       Learning Assessment:  Learning Assessment 8/27/2019   PRIMARY LEARNER Patient   HIGHEST LEVEL OF EDUCATION - PRIMARY LEARNER  -   PRIMARY LANGUAGE ENGLISH   LEARNER PREFERENCE PRIMARY PICTURES     LISTENING     VIDEOS     DEMONSTRATION     READING   ANSWERED BY patient   RELATIONSHIP SELF       Abuse Screening:  Abuse Screening Questionnaire 1/8/2020   Do you ever feel afraid of your partner? N   Are you in a relationship with someone who physically or mentally threatens you? N   Is it safe for you to go home? Y       Fall Risk  Fall Risk Assessment, last 12 mths 9/10/2020   Able to walk? Yes   Fall in past 12 months? No   Fall with injury? -   Number of falls in past 12 months -   Fall Risk Score -       Pt currently taking Anticoagulant therapy? no  Coordination of Care:  1. Have you been to the ER, urgent care clinic since your last visit? Hospitalized since your last visit? no    2. Have you seen or consulted any other health care providers outside of the [address] [place] [address] since your last visit? Include any pap smears or colon screening.  no No

## 2021-08-19 ENCOUNTER — TELEPHONE (OUTPATIENT)
Dept: FAMILY MEDICINE CLINIC | Age: 71
End: 2021-08-19

## 2021-08-19 DIAGNOSIS — E78.00 ELEVATED LDL CHOLESTEROL LEVEL: ICD-10-CM

## 2021-08-19 DIAGNOSIS — D50.0 IRON DEFICIENCY ANEMIA DUE TO CHRONIC BLOOD LOSS: ICD-10-CM

## 2021-08-19 DIAGNOSIS — E55.9 VITAMIN D DEFICIENCY: ICD-10-CM

## 2021-08-19 DIAGNOSIS — K70.30 ALCOHOLIC CIRRHOSIS OF LIVER WITHOUT ASCITES (HCC): Primary | ICD-10-CM

## 2021-08-19 NOTE — TELEPHONE ENCOUNTER
[facility]   called with patient on another line. She stated patient has had 2 syncopal episodes and he is having the same symptoms as he did before when he needed a blood transfusion. She is requesting a sooner appointment. Schedule reviewed and no open appointments. Please advise where we can schedule patient.  Patient can be reached at [phone]

## 2021-08-19 NOTE — TELEPHONE ENCOUNTER
Returned call. States he can tell when he needs a transfusion because his back starts itching  Last transfusion was 3/13/21. Ordered by Abdirizak Sultana. He has not called Dr. Kindra Lindsay about his symptoms  Last time he passed out was last week, he thinks it was Tuesday. He did not go to the ED because he passes out all the time. Denies nausea, vomiting, past week his stool has been brown. He is not on iron supplements. Advised him to call Dr. Abdirizak Sultana and update him on his symptoms and that he is going to get labs drawn. I let him know Dr. Harmony Romero would like him to make a follow up appointment. Per letter in the chart. Plan for his current symptoms:   Got to [address] [place] [address] lab for blood work   Make follow up appointment with me. He verbalized understanding and is in agreement with this plan of care. Informed him to call me if he has any problems.

## 2021-08-25 ENCOUNTER — OFFICE VISIT (OUTPATIENT)
Dept: CARDIOLOGY CLINIC | Age: 71
End: 2021-08-25
Payer: MEDICARE

## 2021-08-25 DIAGNOSIS — Z95.9 CARDIAC DEVICE IN SITU: ICD-10-CM

## 2021-08-25 DIAGNOSIS — R55 SYNCOPE, UNSPECIFIED SYNCOPE TYPE: Primary | ICD-10-CM

## 2021-08-25 PROCEDURE — 93298 REM INTERROG DEV EVAL SCRMS: CPT | Performed by: INTERNAL MEDICINE

## 2021-09-02 NOTE — PROGRESS NOTES
I have personally seen and evaluated the device findings. Interrogation reviewed and I agree with assessment.     Aye Bueno

## 2021-09-30 ENCOUNTER — OFFICE VISIT (OUTPATIENT)
Dept: CARDIOLOGY CLINIC | Age: 71
End: 2021-09-30
Payer: MEDICARE

## 2021-09-30 VITALS
TEMPERATURE: 96.9 F | BODY MASS INDEX: 32.25 KG/M2 | OXYGEN SATURATION: 98 % | RESPIRATION RATE: 18 BRPM | SYSTOLIC BLOOD PRESSURE: 123 MMHG | HEART RATE: 83 BPM | WEIGHT: 218.4 LBS | DIASTOLIC BLOOD PRESSURE: 57 MMHG

## 2021-09-30 DIAGNOSIS — R55 SYNCOPE, UNSPECIFIED SYNCOPE TYPE: Primary | ICD-10-CM

## 2021-09-30 DIAGNOSIS — Z95.9 CARDIAC DEVICE IN SITU: ICD-10-CM

## 2021-09-30 PROCEDURE — 1101F PT FALLS ASSESS-DOCD LE1/YR: CPT | Performed by: INTERNAL MEDICINE

## 2021-09-30 PROCEDURE — G8428 CUR MEDS NOT DOCUMENT: HCPCS | Performed by: INTERNAL MEDICINE

## 2021-09-30 PROCEDURE — 99214 OFFICE O/P EST MOD 30 MIN: CPT | Performed by: INTERNAL MEDICINE

## 2021-09-30 PROCEDURE — G8417 CALC BMI ABV UP PARAM F/U: HCPCS | Performed by: INTERNAL MEDICINE

## 2021-09-30 PROCEDURE — G8432 DEP SCR NOT DOC, RNG: HCPCS | Performed by: INTERNAL MEDICINE

## 2021-09-30 PROCEDURE — 3017F COLORECTAL CA SCREEN DOC REV: CPT | Performed by: INTERNAL MEDICINE

## 2021-09-30 PROCEDURE — G8536 NO DOC ELDER MAL SCRN: HCPCS | Performed by: INTERNAL MEDICINE

## 2021-09-30 NOTE — PROGRESS NOTES
Esperanza Melendez presents today for   Chief Complaint   Patient presents with    Follow-up     overdue       Esperanza Melendez preferred language for health care discussion is english/other. Personal Protective Equipment:   Personal Protective Equipment was used including: mask-surgical and hands-gloves. Patient was placed on no precaution(s). Patient was masked. Precautions:   Patient currently on None  Patient currently roomed with door closed    Is someone accompanying this pt? no    Is the patient using any DME equipment during [address]? no    Depression Screening:  3 most recent PHQ Screens 10/6/2020   PHQ Not Done -   Little interest or pleasure in doing things Not at all   Feeling down, depressed, irritable, or hopeless Not at all   Total Score PHQ 2 0       Learning Assessment:  Learning Assessment 8/27/2019   PRIMARY LEARNER Patient   HIGHEST LEVEL OF EDUCATION - PRIMARY LEARNER  -   PRIMARY LANGUAGE ENGLISH   LEARNER PREFERENCE PRIMARY PICTURES     LISTENING     VIDEOS     DEMONSTRATION     READING   ANSWERED BY patient   RELATIONSHIP SELF       Abuse Screening:  Abuse Screening Questionnaire 1/8/2020   Do you ever feel afraid of your partner? N   Are you in a relationship with someone who physically or mentally threatens you? N   Is it safe for you to go home? Y       Fall Risk  Fall Risk Assessment, last 12 mths 9/30/2021   Able to walk? Yes   Fall in past 12 months? 0   Do you feel unsteady? 0   Are you worried about falling 0   Number of falls in past 12 months -   Fall with injury? -       Pt currently taking Anticoagulant therapy? no    Coordination of Care:  1. Have you been to the ER, urgent care clinic since your last visit? Hospitalized since your last visit? no    2. Have you seen or consulted any other health care providers outside of the [address] [place] [address] [facility] since your last visit? Include any pap smears or colon screening.  no

## 2021-09-30 NOTE — LETTER
9/30/2021    Patient: Angelique Watson   YOB: 1950   Date of Visit: 9/30/2021     Cain Hardin NP  [address]  [facility] [address]    Dear Cain Hardin NP,      Thank you for referring Mr. Kalee Atkins to [facility] for evaluation. My notes for this consultation are attached. If you have questions, please do not hesitate to call me. I look forward to following your patient along with you.       Sincerely,    Cynthia Nunez MD

## 2021-09-30 NOTE — PROGRESS NOTES
[facility]    Mr. Lauren King is a 72 y.o. male with history of alcohol dependence, DJD, history of subarachnoid hemorrhage, syncope    Patient is here today for follow-up appointment. He continues to have syncopal episodes. Over last 12 months, he had 2 episode of syncope. He said last episode was in July. His loop recorder interrogation was in 04/2021. He denies any presyncope or syncope. Because of his right hip pain he is considering right hip ligament surgery. He has appointment with orthopedic team to discuss. He denies palpitation. Denies any angina or heart failure symptoms drinks 6 pack of beer over 1 week. He is doing more before  Denies any nausea, vomiting, abdominal pain, fever, chills, sputum production. No hematuria or other bleeding complaints    Past Medical History:   Diagnosis Date    DJD (degenerative joint disease)     ETOH abuse     Eye inflamed     History of loop recorder 05/2020    Subarachnoid hemorrhage ([facility] [address] 09/2016    Syncope        Past Surgical History:   Procedure Laterality Date    COLONOSCOPY N/A 6/17/2019    COLONOSCOPY performed by Filiberto Hirsch MD at [facility] ENDOSCOPY    HX CATARACT REMOVAL Left 01/24/2018    MN INSERTION SUBQ CARDIAC RHYTHM MONITOR W/PRGRMG N/A 5/29/2020    LOOP RECORDER INSERT performed by Deidra Madsen MD at [facility] CATH LAB       Current Outpatient Medications   Medication Sig    lidocaine (Lidoderm) 5 % Apply patch to the affected area for 12 hours a day and remove for 12 hours a day. (Patient not taking: Reported on 9/30/2021)    furosemide (LASIX) 20 mg tablet Take 20 mg by mouth daily. (Patient not taking: Reported on 9/30/2021)    omeprazole (PRILOSEC OTC) 20 mg tablet Take 40 mg by mouth daily. (Patient not taking: Reported on 9/30/2021)    ramipriL (ALTACE) 1.25 mg capsule Take 1.25 mg by mouth daily.  (Patient not taking: Reported on 9/30/2021)    Carboxymethylcellulose-Glycern (Refresh Optive) 0.5-0.9 % drop  (Patient not taking: Reported on 9/30/2021)    diclofenac EC (VOLTAREN) 75 mg EC tablet TAKE ONE TABLET BY MOUTH TWICE A DAY (Patient not taking: Reported on 9/30/2021)     No current facility-administered medications for this visit. Allergies and Sensitivities:  No Known Allergies    Family History:  Family History   Problem Relation Age of Onset   Hever Blue Cancer Mother     Diabetes Mother     Cancer Father        Social History:  Social History     Tobacco Use    Smoking status: Current Every Day Smoker     Packs/day: 0.25     Years: 45.00     Pack years: 11.25    Smokeless tobacco: Never Used   Substance Use Topics    Alcohol use: Yes     Comment: 3-4 16 oz beers daily    Drug use: No     He  reports that he has been smoking. He has a 11.25 pack-year smoking history. He has never used smokeless tobacco.  He  reports current alcohol use. Review of Systems:  Cardiac symptoms as noted above in HPI. All others negative. Denies fatigue, malaise, skin rash, joint pain, blurring vision, photophobia, neck pain, hemoptysis, chronic cough, nausea, vomiting, hematuria, burning micturition, BRBPR, chronic headaches. Physical Exam:  BP Readings from Last 3 Encounters:   09/30/21 (!) 123/57   10/23/20 (!) 153/98   10/06/20 (!) 146/78         Pulse Readings from Last 3 Encounters:   09/30/21 83   10/23/20 84   10/06/20 100          Wt Readings from Last 3 Encounters:   09/30/21 99.1 kg (218 lb 6.4 oz)   10/23/20 92.5 kg (204 lb)   10/06/20 93.4 kg (206 lb)       Constitutional: Oriented to person, place, and time. HENT: Head: Normocephalic and atraumatic  Neck: No JVD present. Carotid bruit is not appreciated. Cardiovascular: Regular rhythm. No murmur, gallop or rubs appreciated  Lung: Breath sounds normal. No respiratory distress. No ronchi or rales appreciated  Abdominal: No tenderness. No rebound and no guarding.    Musculoskeletal: There is no lower extremity edema. No cynosis      Review of Data  LABS:   Lab Results   Component Value Date/Time    Sodium 138 11/27/2019 03:18 PM    Potassium 3.9 11/27/2019 03:18 PM    Chloride 107 11/27/2019 03:18 PM    CO2 27 11/27/2019 03:18 PM    Glucose 97 11/27/2019 03:18 PM    BUN 12 11/27/2019 03:18 PM    Creatinine 1.04 11/27/2019 03:18 PM     Lipids Latest Ref Rng & Units 8/27/2019   Chol, Total <200 MG/   HDL 40 - 60 MG/DL 56   LDL 0 - 100 MG/. 2(H)   Trig <150 MG/DL 99   Chol/HDL Ratio 0 - 5.0   3.2     Lab Results   Component Value Date/Time    ALT (SGPT) 25 11/27/2019 03:18 PM     No results found for: HBA1C, RIA1KAVD, NZP9ZJJZ, ZBG0OGMJ    EKG    HOLTER (12/19)  Eual Rakes was in Sinus . The average heart rate, excluding ectopy, was 87 BPM with a minimum of 60 BPM at 05:49 D2 and a maximum of 153 BPM at 19:21 D2. Heart beats, including ectopy, totaled 473066 beats. VENTRICULAR ECTOPICS totaled 5756 averaging 134. 2 per hour with 4941 single, 58 paired, 349 trigeminy and 0 R on T. BIGEMINY runs occurred  123 times and totaled 399 beats. PVC [place] 2.7%  SUPRAVENTRICULAR ECTOPICS totaled 43 averaging 1.0 per hour ,with 41 single and 2 paired beats. Patient diary was submitted, no symptoms noted. No patient triggered events noted. ECHO (01/20)  Left Ventricle Normal cavity size and wall thickness. Low normal systolic function. The calculated ejection fraction is 50%. Visually measured ejection fraction. Global hypokinesis observed. There is mild (grade 1) left ventricular diastolic dysfunction. Left Atrium Moderately dilated left atrium. Left Atrium volume index is 49 mL/m2. Right Ventricle Normal cavity size and global systolic function. Right Atrium Normal cavity size. Aortic Valve Normal valve structure, no stenosis and no regurgitation. Mitral Valve No stenosis. Mitral valve non-specific thickening. Trace regurgitation. Tricuspid Valve No stenosis.  Tricuspid regurgitation is inadequate for estimation of right ventricular systolic pressure. There is no evidence of pulmonary hypertension. Pulmonic Valve Pulmonic valve not well visualized, but normal doppler findings. Aorta Normal aortic root and ascending aorta. STRESS TEST (01/20)  · Baseline ECG: Normal EKG, non-specific ST-T wave abnormalities. · Gated SPECT: Left ventricular function post-stress was abnormal. Calculated ejection fraction is 48%. · Left ventricular perfusion is probably normal.  · Myocardial perfusion imaging defect 1: There is a defect that is small in size present in the inferior location(s) that is more pronounced with rest than stress imaging. The defect appears to probably be artifact. · There was no convincing evidence of significant reversible defect to suggest on going major ischemia. Inferior defect is most consistent with diaphragmatic attenuation artifact     CATHETERIZATION    IMPRESSION & PLAN:  Mr. Kelsy Smith is 72 y.o. male with medical problem as mentioned above    Chest pain:  No chest pain since last visit. Denies any anginal symptoms or equivalent   nuclear stress test in January 2020, low risk    Cardiomyopathy:  EF 45-50% in 2019. Repeat echo in January 2020, EF 50%  He denies taking any medication at this time. He has no evidence of fluid overload on exam.  There is no edema or JVD    PVC:  PVC burden of 2.7% noted on Holter monitor in December 2019. Denies any symptoms. He denies any palpitation. He is not taking any medication. No PVC appreciated on exam today. Syncope:  Patient has been having syncopal episode, unexplained for last 5 years. Echo and stress in 2020, low risk  Had 2-3 more episode of syncope since last visit   implantation of loop recorder in May 2020. Last loop recorder interrogation was in April 2021. No tacky or bradycardia arrhythmia was reported  We will proceed with loop recorder interrogation again.   I am going to reach out to PCP regarding referral to neurology team and further noncardiac work-up    This plan was discussed with patient who is in agreement. Thank you for allowing me to participate in patient care. Please feel free to call me if you have any question or concern. Chetan Garza MD  Please note: This document has been produced using voice recognition software. Unrecognized errors in transcription may be present.

## 2021-10-04 ENCOUNTER — OFFICE VISIT (OUTPATIENT)
Dept: ORTHOPEDIC SURGERY | Age: 71
End: 2021-10-04
Payer: MEDICARE

## 2021-10-04 VITALS
BODY MASS INDEX: 32.29 KG/M2 | HEART RATE: 91 BPM | WEIGHT: 218 LBS | OXYGEN SATURATION: 100 % | HEIGHT: 69 IN | TEMPERATURE: 98.6 F

## 2021-10-04 DIAGNOSIS — G89.29 CHRONIC PAIN OF RIGHT HIP: ICD-10-CM

## 2021-10-04 DIAGNOSIS — M25.552 CHRONIC LEFT HIP PAIN: ICD-10-CM

## 2021-10-04 DIAGNOSIS — M25.859 FEMOROACETABULAR IMPINGEMENT: ICD-10-CM

## 2021-10-04 DIAGNOSIS — M16.12 PRIMARY OSTEOARTHRITIS OF LEFT HIP: ICD-10-CM

## 2021-10-04 DIAGNOSIS — M25.551 CHRONIC PAIN OF RIGHT HIP: ICD-10-CM

## 2021-10-04 DIAGNOSIS — M16.11 PRIMARY OSTEOARTHRITIS OF RIGHT HIP: ICD-10-CM

## 2021-10-04 DIAGNOSIS — G89.29 CHRONIC LEFT HIP PAIN: ICD-10-CM

## 2021-10-04 DIAGNOSIS — M87.059 AVASCULAR NECROSIS OF BONE OF HIP, UNSPECIFIED LATERALITY (HCC): Primary | ICD-10-CM

## 2021-10-04 PROCEDURE — 20610 DRAIN/INJ JOINT/BURSA W/O US: CPT | Performed by: SPECIALIST

## 2021-10-04 PROCEDURE — 1101F PT FALLS ASSESS-DOCD LE1/YR: CPT | Performed by: SPECIALIST

## 2021-10-04 PROCEDURE — 3017F COLORECTAL CA SCREEN DOC REV: CPT | Performed by: SPECIALIST

## 2021-10-04 PROCEDURE — G8536 NO DOC ELDER MAL SCRN: HCPCS | Performed by: SPECIALIST

## 2021-10-04 PROCEDURE — G8427 DOCREV CUR MEDS BY ELIG CLIN: HCPCS | Performed by: SPECIALIST

## 2021-10-04 PROCEDURE — G8417 CALC BMI ABV UP PARAM F/U: HCPCS | Performed by: SPECIALIST

## 2021-10-04 PROCEDURE — 99214 OFFICE O/P EST MOD 30 MIN: CPT | Performed by: SPECIALIST

## 2021-10-04 PROCEDURE — G8432 DEP SCR NOT DOC, RNG: HCPCS | Performed by: SPECIALIST

## 2021-10-04 RX ORDER — BETAMETHASONE SODIUM PHOSPHATE AND BETAMETHASONE ACETATE 3; 3 MG/ML; MG/ML
6 INJECTION, SUSPENSION INTRA-ARTICULAR; INTRALESIONAL; INTRAMUSCULAR; SOFT TISSUE ONCE
Status: COMPLETED | OUTPATIENT
Start: 2021-10-04 | End: 2021-10-04

## 2021-10-04 RX ADMIN — BETAMETHASONE SODIUM PHOSPHATE AND BETAMETHASONE ACETATE 6 MG: 3; 3 INJECTION, SUSPENSION INTRA-ARTICULAR; INTRALESIONAL; INTRAMUSCULAR; SOFT TISSUE at 14:31

## 2021-10-04 NOTE — PROGRESS NOTES
Patient: Ishmael Lott                MRN: [id number]       SSN: [id number]  YOB: 1950        AGE: 72 y.o. SEX: male    PCP: Kirt Celis NP  10/04/21    CC: BILATERAL HIP PAIN    HISTORY:  Ishmael Lott is a 72 y.o. male who is seen for bilateral severe hip pain R>L. She was referred by GLENDA Jean. He has been experiencing hip pain for the past few years. He does not recall any injury. He feels pain in his groin and lateral hip with standing and walking. His hip stiffens up after he sits for long periods of time. He even has trouble walking from his bathroom to his bedroom. Pain Assessment  10/4/2021   Location of Pain Hip   Location Modifiers Right   Severity of Pain 7   Quality of Pain Aching   Duration of Pain Persistent   Frequency of Pain Intermittent   Aggravating Factors Walking; Other (Comment)   Aggravating Factors Comment laying on right side   Limiting Behavior No   Relieving Factors Heat   Relieving Factors Comment -   Result of Injury No   Work-Related Injury -   Type of Injury -     Occupation, etc:  Mr. Suzy Oviedo has been retired for 6 years. He used to work at [employer] and [employer]. He is quite sedentary and frequently watches TV. He lives with his daughter in law and step son in [place]. He has 2 biological daughters and 17 grandchildren. He used to participate in the silver sneaker's program at the . He [id number]# recently. Mr. Suzy Oviedo weighs 218 lbs and is 5'9\" tall. He is not diabetic.      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    Alcoholic cirrhosis of liver without ascites (HCC) K70.30    Hemangioma of intra-abdominal structure D18.03    Elevated LDL cholesterol level E78.00     REVIEW OF SYSTEMS:    Constitutional Symptoms: Negative   Eyes: Negative   Ears, Nose, Throat and Mouth: Negative   Cardiovascular: Negative   Respiratory: Negative   Genitourinary: Per HPI   Gastrointestinal: Per HPI   Integumentary (Skin and/or Breast): Negative   Musculoskeletal: Per HPI   Endocrine/Rheumatologic: Negative   Neurological: Per HPI   Hematology/Lymphatic: Negative    Allergic/Immunologic: Negative   Phychiatric: Negative    Social History     Socioeconomic History    Marital status:      Spouse name: Not on file    Number of children: Not on file    Years of education: Not on file    Highest education level: Not on file   Occupational History    Not on file   Tobacco Use    Smoking status: Current Every Day Smoker     Packs/day: 0.25     Years: 45.00     Pack years: 11.25    Smokeless tobacco: Never Used   Substance and Sexual Activity    Alcohol use: Yes     Comment: 3-4 16 oz beers daily    Drug use: No    Sexual activity: Not on file   Other Topics Concern    Not on file   Social History Narrative    Not on file     Social Determinants of Health     Financial Resource Strain:     Difficulty of Paying Living Expenses:    Food Insecurity:     Worried About Running Out of Food in the Last Year:     Ran Out of Food in the Last Year:    Transportation Needs:     Lack of Transportation (Medical):      Lack of Transportation (Non-Medical):    Physical Activity:     Days of Exercise per Week:     Minutes of Exercise per Session:    Stress:     Feeling of Stress :    Social Connections:     Frequency of Communication with Friends and Family:     Frequency of Social Gatherings with Friends and Family:     Attends Voodoo Services:     Active Member of Clubs or Organizations:     Attends Club or Organization Meetings:     Marital Status:    Intimate Partner Violence:     Fear of Current or Ex-Partner:     Emotionally Abused:     Physically Abused:     Sexually Abused:       No Known Allergies   Current Outpatient Medications   Medication Sig    lidocaine (Lidoderm) 5 % Apply patch to the affected area for 12 hours a day and remove for 12 hours a day. (Patient not taking: Reported on 9/30/2021)    furosemide (LASIX) 20 mg tablet Take 20 mg by mouth daily. (Patient not taking: Reported on 9/30/2021)    omeprazole (PRILOSEC OTC) 20 mg tablet Take 40 mg by mouth daily. (Patient not taking: Reported on 9/30/2021)    ramipriL (ALTACE) 1.25 mg capsule Take 1.25 mg by mouth daily. (Patient not taking: Reported on 9/30/2021)    Carboxymethylcellulose-Glycern (Refresh Optive) 0.5-0.9 % drop  (Patient not taking: Reported on 9/30/2021)    diclofenac EC (VOLTAREN) 75 mg EC tablet TAKE ONE TABLET BY MOUTH TWICE A DAY (Patient not taking: Reported on 9/30/2021)     Current Facility-Administered Medications   Medication Dose Route Frequency    betamethasone (CELESTONE) injection 6 mg  6 mg IntraBURSal ONCE      PHYSICAL EXAMINATION:  Visit Vitals  Pulse 91   Temp 98.6 °F (37 °C) (Temporal)   Ht 5' 9\" (1.753 m)   Wt 218 lb (98.9 kg)   SpO2 100%   BMI 32.19 kg/m²    Appearance: Alert, well appearing and pleasant patient who is in no distress, oriented to person, place/time, and who follows commands. HEENT: [place] hears well, does not require hearing aids. His sclera of the eyes are non-icteric. He is breathing normally and no respiratory accessory muscle use is noted. No JVD present and Neck ROM within normal limits. Psychiatric: Affect and mood are appropriate. Oriented x3  Cardiovascular/Peripheral Vascular: Normal pulses to each foot. Integumentary: No rashes. Warm and normal color. No drainage. Gait: antalgic  Sensory Exam: Intact/Normal Sensation    Lymphatic: No evidence of Lymphedema  Vascular:       Pulses: palpable  Varicosities none  Wounds/Abrasion: None Present  Neuro: Negative, no tremors  ORTHO EXAMINATION:  Examination Right hip Left hip   Skin Intact Intact   External Rotation ROM 5 5   Internal Rotation ROM 0 0   Trochanteric tenderness + +   Hip flexion contracture + +   Antalgic gait + +   Trendelenberg sign + +   Lumbar tenderness - -   Straight leg raise - -   Calf tenderness - -   Neurovascular Intact Intact   ambulating with single point cane  Standing forward flexed at waist   Walks with a crouch gait  Difficulty getting off exam table    TIME OUT:  Chart reviewed for the following:   Puma Cloud MD, have reviewed the History, Physical and updated the Allergic reactions for [facility] performed immediately prior to start of procedure:  Puma Cloud MD, have performed the following reviews on [address] [place] [address] prior to the start of the procedure:          * Patient was identified by name and date of birth   * Agreement on procedure being performed was verified  * Risks and Benefits explained to the patient  * Procedure site verified and marked as necessary  * Patient was positioned for comfort  * Consent was obtained     Time: 2:25 PM     Date of procedure: 10/4/2021  Procedure performed by:  Dior Smith MD  Mr. Merced Rizo tolerated the procedure well with no complications. RADIOGRAPHS:  XR RIGHT HIP 1/8/20 [facility] IMAGING   IMPRESSION:  Advanced osteoarthritis of both hips, right greater than left, with slight  flattening of the weightbearing articular surface of the right femoral head,  possibly avascular necrosis. -I have independently reviewed these images during this office visit. -Dr. Barron Ace:  AP pelvis and two views - No fractures, severe joint space narrowing, + osteophytes present.  Tonnis grade 4, findings consistent with AVN, SABIHA. IMPRESSION:      ICD-10-CM ICD-9-CM    1. Avascular necrosis of bone of hip, unspecified laterality (HCC)  M87.059 733.42    2. Primary osteoarthritis of left hip  M16.12 715.15 NM DRAIN/INJECT LARGE JOINT/BURSA      betamethasone (CELESTONE) injection 6 mg      REFERRAL TO PHYSICAL THERAPY   3. Chronic left hip pain  M25.552 719.45     G89.29 338.29    4. Primary osteoarthritis of right hip  M16.11 715.15 NM DRAIN/INJECT LARGE JOINT/BURSA      betamethasone (CELESTONE) injection 6 mg      REFERRAL TO PHYSICAL THERAPY   5. Chronic pain of right hip  M25.551 719.45     G89.29 338.29    6. Femoroacetabular impingement  M25.859 719.85      PLAN:  He will start a brief course of outpatient aqua physical therapy. After discussing treatment options, patient's lateral hips were injected with 4 cc Marcaine and 1/2 cc Celestone. We discussed likely need for hip arthroplasty at some time in the future pending weight loss. He will follow up as needed.       Scribed by Miguel A Gerardo ([address]) as dictated by Angel Campbell MD

## 2021-10-13 ENCOUNTER — APPOINTMENT (OUTPATIENT)
Dept: PHYSICAL THERAPY | Age: 71
End: 2021-10-13
Attending: SPECIALIST

## 2021-12-03 NOTE — PROGRESS NOTES
I have personally seen and evaluated the device findings. Interrogation reviewed and I agree with assessment.     Anoop Raphael

## 2022-01-01 ENCOUNTER — HOSPITAL ENCOUNTER (OUTPATIENT)
Dept: LAB | Age: 72
Discharge: HOME | End: 2022-04-26
Payer: MEDICARE

## 2022-01-01 ENCOUNTER — OFFICE VISIT (OUTPATIENT)
Dept: FAMILY MEDICINE CLINIC | Age: 72
End: 2022-01-01
Payer: MEDICARE

## 2022-01-01 ENCOUNTER — HOSPITAL ENCOUNTER (OUTPATIENT)
Dept: INFUSION THERAPY | Age: 72
Discharge: HOME | End: 2022-06-14
Payer: MEDICARE

## 2022-01-01 ENCOUNTER — HOSPITAL ENCOUNTER (OUTPATIENT)
Dept: INFUSION THERAPY | Age: 72
Discharge: HOME | End: 2022-07-25
Payer: MEDICARE

## 2022-01-01 ENCOUNTER — TELEPHONE (OUTPATIENT)
Dept: CARDIOLOGY CLINIC | Age: 72
End: 2022-01-01

## 2022-01-01 ENCOUNTER — OFFICE VISIT (OUTPATIENT)
Dept: CARDIOLOGY CLINIC | Age: 72
End: 2022-01-01
Payer: MEDICARE

## 2022-01-01 ENCOUNTER — TELEPHONE (OUTPATIENT)
Dept: FAMILY MEDICINE CLINIC | Age: 72
End: 2022-01-01

## 2022-01-01 ENCOUNTER — HOSPITAL ENCOUNTER (OUTPATIENT)
Dept: INFUSION THERAPY | Age: 72
Discharge: HOME | End: 2022-08-16
Payer: MEDICARE

## 2022-01-01 ENCOUNTER — OFFICE VISIT (OUTPATIENT)
Age: 72
End: 2022-01-01
Payer: MEDICARE

## 2022-01-01 ENCOUNTER — HOSPITAL ENCOUNTER (OUTPATIENT)
Dept: INFUSION THERAPY | Age: 72
Discharge: HOME | End: 2022-09-09
Attending: INTERNAL MEDICINE
Payer: MEDICARE

## 2022-01-01 ENCOUNTER — CLINICAL SUPPORT (OUTPATIENT)
Dept: CARDIOLOGY CLINIC | Age: 72
End: 2022-01-01
Payer: MEDICARE

## 2022-01-01 ENCOUNTER — HOSPITAL ENCOUNTER (OUTPATIENT)
Dept: INFUSION THERAPY | Age: 72
Discharge: HOME | End: 2022-08-02
Payer: MEDICARE

## 2022-01-01 ENCOUNTER — HOSPITAL ENCOUNTER (OUTPATIENT)
Dept: LAB | Age: 72
Discharge: HOME | End: 2022-05-20
Payer: MEDICARE

## 2022-01-01 VITALS
TEMPERATURE: 97.8 F | WEIGHT: 224 LBS | DIASTOLIC BLOOD PRESSURE: 80 MMHG | OXYGEN SATURATION: 98 % | SYSTOLIC BLOOD PRESSURE: 126 MMHG | BODY MASS INDEX: 33.08 KG/M2 | HEART RATE: 84 BPM

## 2022-01-01 VITALS
HEIGHT: 69 IN | BODY MASS INDEX: 33.03 KG/M2 | SYSTOLIC BLOOD PRESSURE: 127 MMHG | RESPIRATION RATE: 20 BRPM | TEMPERATURE: 98.1 F | OXYGEN SATURATION: 99 % | HEART RATE: 81 BPM | WEIGHT: 223 LBS | DIASTOLIC BLOOD PRESSURE: 64 MMHG

## 2022-01-01 VITALS
SYSTOLIC BLOOD PRESSURE: 138 MMHG | WEIGHT: 220 LBS | TEMPERATURE: 98.8 F | OXYGEN SATURATION: 98 % | HEART RATE: 92 BPM | BODY MASS INDEX: 32.58 KG/M2 | RESPIRATION RATE: 18 BRPM | HEIGHT: 69 IN | DIASTOLIC BLOOD PRESSURE: 60 MMHG

## 2022-01-01 VITALS
HEART RATE: 93 BPM | DIASTOLIC BLOOD PRESSURE: 59 MMHG | WEIGHT: 223 LBS | BODY MASS INDEX: 32.93 KG/M2 | OXYGEN SATURATION: 93 % | SYSTOLIC BLOOD PRESSURE: 145 MMHG

## 2022-01-01 VITALS — TEMPERATURE: 97.8 F

## 2022-01-01 VITALS
RESPIRATION RATE: 18 BRPM | TEMPERATURE: 98.8 F | HEART RATE: 71 BPM | DIASTOLIC BLOOD PRESSURE: 76 MMHG | SYSTOLIC BLOOD PRESSURE: 129 MMHG | OXYGEN SATURATION: 98 %

## 2022-01-01 VITALS
RESPIRATION RATE: 20 BRPM | DIASTOLIC BLOOD PRESSURE: 71 MMHG | BODY MASS INDEX: 33.62 KG/M2 | WEIGHT: 227 LBS | HEART RATE: 66 BPM | HEIGHT: 69 IN | SYSTOLIC BLOOD PRESSURE: 139 MMHG | TEMPERATURE: 98.3 F | OXYGEN SATURATION: 100 %

## 2022-01-01 VITALS
TEMPERATURE: 98.2 F | DIASTOLIC BLOOD PRESSURE: 67 MMHG | RESPIRATION RATE: 16 BRPM | HEART RATE: 78 BPM | SYSTOLIC BLOOD PRESSURE: 140 MMHG | OXYGEN SATURATION: 98 %

## 2022-01-01 VITALS
WEIGHT: 224.2 LBS | HEIGHT: 69 IN | RESPIRATION RATE: 19 BRPM | BODY MASS INDEX: 33.21 KG/M2 | TEMPERATURE: 98 F | DIASTOLIC BLOOD PRESSURE: 74 MMHG | OXYGEN SATURATION: 97 % | HEART RATE: 92 BPM | SYSTOLIC BLOOD PRESSURE: 125 MMHG

## 2022-01-01 VITALS
HEART RATE: 78 BPM | OXYGEN SATURATION: 97 % | SYSTOLIC BLOOD PRESSURE: 138 MMHG | RESPIRATION RATE: 18 BRPM | DIASTOLIC BLOOD PRESSURE: 83 MMHG | HEIGHT: 69 IN | WEIGHT: 225 LBS | BODY MASS INDEX: 33.33 KG/M2 | TEMPERATURE: 97.7 F

## 2022-01-01 VITALS
HEART RATE: 92 BPM | RESPIRATION RATE: 18 BRPM | SYSTOLIC BLOOD PRESSURE: 116 MMHG | TEMPERATURE: 97.9 F | DIASTOLIC BLOOD PRESSURE: 63 MMHG | OXYGEN SATURATION: 96 %

## 2022-01-01 VITALS — TEMPERATURE: 98 F

## 2022-01-01 DIAGNOSIS — D50.0 IRON DEFICIENCY ANEMIA DUE TO CHRONIC BLOOD LOSS: Primary | ICD-10-CM

## 2022-01-01 DIAGNOSIS — M25.551 CHRONIC RIGHT HIP PAIN: ICD-10-CM

## 2022-01-01 DIAGNOSIS — R91.1 LUNG NODULE: ICD-10-CM

## 2022-01-01 DIAGNOSIS — R03.0 ELEVATED BLOOD PRESSURE READING IN OFFICE WITH WHITE COAT SYNDROME, WITHOUT DIAGNOSIS OF HYPERTENSION: ICD-10-CM

## 2022-01-01 DIAGNOSIS — Z95.9 CARDIAC DEVICE IN SITU: Primary | ICD-10-CM

## 2022-01-01 DIAGNOSIS — Z00.00 MEDICARE ANNUAL WELLNESS VISIT, SUBSEQUENT: Primary | ICD-10-CM

## 2022-01-01 DIAGNOSIS — E03.8 OTHER SPECIFIED HYPOTHYROIDISM: ICD-10-CM

## 2022-01-01 DIAGNOSIS — D50.0 IRON DEFICIENCY ANEMIA DUE TO CHRONIC BLOOD LOSS: ICD-10-CM

## 2022-01-01 DIAGNOSIS — Z71.89 ADVANCED CARE PLANNING/COUNSELING DISCUSSION: ICD-10-CM

## 2022-01-01 DIAGNOSIS — E51.9 THIAMINE DEFICIENCY: ICD-10-CM

## 2022-01-01 DIAGNOSIS — I42.0 DILATED CARDIOMYOPATHY (HCC): Primary | ICD-10-CM

## 2022-01-01 DIAGNOSIS — R55 SYNCOPE, UNSPECIFIED SYNCOPE TYPE: Primary | ICD-10-CM

## 2022-01-01 DIAGNOSIS — Z95.9 CARDIAC DEVICE IN SITU: ICD-10-CM

## 2022-01-01 DIAGNOSIS — R16.0 LIVER MASS: ICD-10-CM

## 2022-01-01 DIAGNOSIS — R55 SYNCOPE, UNSPECIFIED SYNCOPE TYPE: ICD-10-CM

## 2022-01-01 DIAGNOSIS — G89.29 CHRONIC RIGHT HIP PAIN: ICD-10-CM

## 2022-01-01 DIAGNOSIS — R26.81 UNSTEADY GAIT WHEN WALKING: ICD-10-CM

## 2022-01-01 DIAGNOSIS — E55.9 VITAMIN D DEFICIENCY: ICD-10-CM

## 2022-01-01 DIAGNOSIS — K70.30 ALCOHOLIC CIRRHOSIS OF LIVER WITHOUT ASCITES (HCC): ICD-10-CM

## 2022-01-01 DIAGNOSIS — I85.00 ESOPHAGEAL VARICES DETERMINED BY ENDOSCOPY (HCC): ICD-10-CM

## 2022-01-01 DIAGNOSIS — E53.8 VITAMIN B 12 DEFICIENCY: ICD-10-CM

## 2022-01-01 DIAGNOSIS — D64.9 ANEMIA, UNSPECIFIED TYPE: ICD-10-CM

## 2022-01-01 DIAGNOSIS — M16.10 PRIMARY OSTEOARTHRITIS OF HIP, UNSPECIFIED LATERALITY: ICD-10-CM

## 2022-01-01 DIAGNOSIS — D18.03 HEMANGIOMA OF LIVER: ICD-10-CM

## 2022-01-01 DIAGNOSIS — R06.02 SOB (SHORTNESS OF BREATH) ON EXERTION: ICD-10-CM

## 2022-01-01 DIAGNOSIS — C7A.8 MALIGNANT NEUROENDOCRINE NEOPLASM (HCC): ICD-10-CM

## 2022-01-01 DIAGNOSIS — Z72.0 TOBACCO ABUSE DISORDER: ICD-10-CM

## 2022-01-01 DIAGNOSIS — Z91.81 AT HIGH RISK FOR FALLS: ICD-10-CM

## 2022-01-01 LAB
25(OH)D3 SERPL-MCNC: 16.4 NG/ML (ref 30–100)
AFP-TM SERPL-MCNC: 3.3 NG/ML (ref 0–8.4)
ALBUMIN SERPL-MCNC: 3.5 G/DL (ref 3.4–5)
ALBUMIN SERPL-MCNC: 3.5 G/DL (ref 3.4–5)
ALBUMIN SERPL-MCNC: 3.6 G/DL (ref 3.4–5)
ALBUMIN/GLOB SERPL: 0.8 {RATIO} (ref 0.8–1.7)
ALP SERPL-CCNC: 81 U/L (ref 45–117)
ALP SERPL-CCNC: 86 U/L (ref 45–117)
ALP SERPL-CCNC: 91 U/L (ref 45–117)
ALT SERPL-CCNC: 24 U/L (ref 16–61)
ALT SERPL-CCNC: 29 U/L (ref 16–61)
ALT SERPL-CCNC: 35 U/L (ref 16–61)
ANION GAP SERPL CALC-SCNC: 5 MMOL/L (ref 3–18)
ANION GAP SERPL CALC-SCNC: 6 MMOL/L (ref 3–18)
ANION GAP SERPL CALC-SCNC: 6 MMOL/L (ref 3–18)
AST SERPL-CCNC: 32 U/L (ref 10–38)
AST SERPL-CCNC: 32 U/L (ref 10–38)
AST SERPL-CCNC: 38 U/L (ref 10–38)
BASOPHILS # BLD: 0 K/UL (ref 0–0.1)
BASOPHILS # BLD: 0 K/UL (ref 0–0.1)
BASOPHILS NFR BLD: 1 % (ref 0–2)
BASOPHILS NFR BLD: 1 % (ref 0–2)
BILIRUB SERPL-MCNC: 0.3 MG/DL (ref 0.2–1)
BILIRUB SERPL-MCNC: 0.4 MG/DL (ref 0.2–1)
BILIRUB SERPL-MCNC: 0.4 MG/DL (ref 0.2–1)
BUN SERPL-MCNC: 11 MG/DL (ref 7–18)
BUN SERPL-MCNC: 7 MG/DL (ref 7–18)
BUN SERPL-MCNC: 8 MG/DL (ref 7–18)
BUN/CREAT SERPL: 12 (ref 12–20)
BUN/CREAT SERPL: 7 (ref 12–20)
BUN/CREAT SERPL: 8 (ref 12–20)
CALCIUM SERPL-MCNC: 8.8 MG/DL (ref 8.5–10.1)
CALCIUM SERPL-MCNC: 9 MG/DL (ref 8.5–10.1)
CALCIUM SERPL-MCNC: 9.3 MG/DL (ref 8.5–10.1)
CGA SERPL-SCNC: 112.2 NG/ML (ref 0–101.8)
CHLORIDE SERPL-SCNC: 106 MMOL/L (ref 100–111)
CHLORIDE SERPL-SCNC: 107 MMOL/L (ref 100–111)
CHLORIDE SERPL-SCNC: 109 MMOL/L (ref 100–111)
CO2 SERPL-SCNC: 25 MMOL/L (ref 21–32)
CO2 SERPL-SCNC: 26 MMOL/L (ref 21–32)
CO2 SERPL-SCNC: 26 MMOL/L (ref 21–32)
CREAT SERPL-MCNC: 0.95 MG/DL (ref 0.6–1.3)
CREAT SERPL-MCNC: 0.99 MG/DL (ref 0.6–1.3)
CREAT SERPL-MCNC: 1.07 MG/DL (ref 0.6–1.3)
DIFFERENTIAL METHOD BLD: ABNORMAL
DIFFERENTIAL METHOD BLD: ABNORMAL
EOSINOPHIL # BLD: 0.2 K/UL (ref 0–0.4)
EOSINOPHIL # BLD: 0.2 K/UL (ref 0–0.4)
EOSINOPHIL NFR BLD: 4 % (ref 0–5)
EOSINOPHIL NFR BLD: 4 % (ref 0–5)
ERYTHROCYTE [DISTWIDTH] IN BLOOD BY AUTOMATED COUNT: 18.9 % (ref 11.6–14.5)
ERYTHROCYTE [DISTWIDTH] IN BLOOD BY AUTOMATED COUNT: 21.4 % (ref 11.6–14.5)
ERYTHROCYTE [DISTWIDTH] IN BLOOD BY AUTOMATED COUNT: 23.9 % (ref 11.6–14.5)
ERYTHROCYTE [DISTWIDTH] IN BLOOD BY AUTOMATED COUNT: 29.4 % (ref 11.6–14.5)
FERRITIN SERPL-MCNC: 20 NG/ML (ref 8–388)
FERRITIN SERPL-MCNC: 8 NG/ML (ref 8–388)
FOLATE SERPL-MCNC: 19.2 NG/ML (ref 3.1–17.5)
GLOBULIN SER CALC-MCNC: 4.5 G/DL (ref 2–4)
GLOBULIN SER CALC-MCNC: 4.5 G/DL (ref 2–4)
GLOBULIN SER CALC-MCNC: 4.6 G/DL (ref 2–4)
GLUCOSE SERPL-MCNC: 100 MG/DL (ref 74–99)
GLUCOSE SERPL-MCNC: 103 MG/DL (ref 74–99)
GLUCOSE SERPL-MCNC: 103 MG/DL (ref 74–99)
HCT VFR BLD AUTO: 19.9 % (ref 36–48)
HCT VFR BLD AUTO: 33.5 % (ref 36–48)
HCT VFR BLD AUTO: 35.6 % (ref 36–48)
HCT VFR BLD AUTO: 36.6 % (ref 36–48)
HGB BLD-MCNC: 10.7 G/DL (ref 13–16)
HGB BLD-MCNC: 10.7 G/DL (ref 13–16)
HGB BLD-MCNC: 5 G/DL (ref 13–16)
HGB BLD-MCNC: 9.5 G/DL (ref 13–16)
IMM GRANULOCYTES # BLD AUTO: 0 K/UL (ref 0–0.04)
IMM GRANULOCYTES # BLD AUTO: 0 K/UL (ref 0–0.04)
IMM GRANULOCYTES NFR BLD AUTO: 0 % (ref 0–0.5)
IMM GRANULOCYTES NFR BLD AUTO: 0 % (ref 0–0.5)
IRON SATN MFR SERPL: 11 % (ref 20–50)
IRON SATN MFR SERPL: 5 % (ref 20–50)
IRON SERPL-MCNC: 24 UG/DL (ref 50–175)
IRON SERPL-MCNC: 46 UG/DL (ref 50–175)
LYMPHOCYTES # BLD: 1.2 K/UL (ref 0.9–3.6)
LYMPHOCYTES # BLD: 1.3 K/UL (ref 0.9–3.6)
LYMPHOCYTES NFR BLD: 29 % (ref 21–52)
LYMPHOCYTES NFR BLD: 29 % (ref 21–52)
MCH RBC QN AUTO: 18.4 PG (ref 24–34)
MCH RBC QN AUTO: 24.4 PG (ref 24–34)
MCH RBC QN AUTO: 24.6 PG (ref 24–34)
MCH RBC QN AUTO: 25.3 PG (ref 24–34)
MCHC RBC AUTO-ENTMCNC: 25.1 G/DL (ref 31–37)
MCHC RBC AUTO-ENTMCNC: 28.4 G/DL (ref 31–37)
MCHC RBC AUTO-ENTMCNC: 29.2 G/DL (ref 31–37)
MCHC RBC AUTO-ENTMCNC: 30.1 G/DL (ref 31–37)
MCV RBC AUTO: 73.2 FL (ref 78–100)
MCV RBC AUTO: 81.3 FL (ref 78–100)
MCV RBC AUTO: 84.1 FL (ref 78–100)
MCV RBC AUTO: 89.1 FL (ref 78–100)
MONOCYTES # BLD: 0.9 K/UL (ref 0.05–1.2)
MONOCYTES # BLD: 0.9 K/UL (ref 0.05–1.2)
MONOCYTES NFR BLD: 20 % (ref 3–10)
MONOCYTES NFR BLD: 21 % (ref 3–10)
NEUTS SEG # BLD: 1.9 K/UL (ref 1.8–8)
NEUTS SEG # BLD: 2.1 K/UL (ref 1.8–8)
NEUTS SEG NFR BLD: 46 % (ref 40–73)
NEUTS SEG NFR BLD: 47 % (ref 40–73)
NRBC # BLD: 0 K/UL (ref 0–0.01)
NRBC # BLD: 0.02 K/UL (ref 0–0.01)
NRBC BLD-RTO: 0 PER 100 WBC
NRBC BLD-RTO: 0.4 PER 100 WBC
PLATELET # BLD AUTO: 269 K/UL (ref 135–420)
PLATELET # BLD AUTO: 284 K/UL (ref 135–420)
PLATELET # BLD AUTO: 308 K/UL (ref 135–420)
PLATELET # BLD AUTO: 332 K/UL (ref 135–420)
PMV BLD AUTO: 10.1 FL (ref 9.2–11.8)
PMV BLD AUTO: 10.1 FL (ref 9.2–11.8)
PMV BLD AUTO: 10.3 FL (ref 9.2–11.8)
PMV BLD AUTO: 9.8 FL (ref 9.2–11.8)
POTASSIUM SERPL-SCNC: 3.9 MMOL/L (ref 3.5–5.5)
POTASSIUM SERPL-SCNC: 4 MMOL/L (ref 3.5–5.5)
POTASSIUM SERPL-SCNC: 4.1 MMOL/L (ref 3.5–5.5)
PROT SERPL-MCNC: 8 G/DL (ref 6.4–8.2)
PROT SERPL-MCNC: 8 G/DL (ref 6.4–8.2)
PROT SERPL-MCNC: 8.2 G/DL (ref 6.4–8.2)
RBC # BLD AUTO: 2.72 M/UL (ref 4.35–5.65)
RBC # BLD AUTO: 3.76 M/UL (ref 4.35–5.65)
RBC # BLD AUTO: 4.35 M/UL (ref 4.35–5.65)
RBC # BLD AUTO: 4.38 M/UL (ref 4.35–5.65)
SODIUM SERPL-SCNC: 137 MMOL/L (ref 136–145)
SODIUM SERPL-SCNC: 139 MMOL/L (ref 136–145)
SODIUM SERPL-SCNC: 140 MMOL/L (ref 136–145)
TIBC SERPL-MCNC: 431 UG/DL (ref 250–450)
TIBC SERPL-MCNC: 462 UG/DL (ref 250–450)
TSH SERPL DL<=0.05 MIU/L-ACNC: 1.78 UIU/ML (ref 0.36–3.74)
VIT B1 BLD-SCNC: 140.5 NMOL/L (ref 66.5–200)
VIT B12 SERPL-MCNC: 575 PG/ML (ref 211–911)
WBC # BLD AUTO: 4.1 K/UL (ref 4.6–13.2)
WBC # BLD AUTO: 4.5 K/UL (ref 4.6–13.2)
WBC # BLD AUTO: 4.9 K/UL (ref 4.6–13.2)
WBC # BLD AUTO: 5.5 K/UL (ref 4.6–13.2)

## 2022-01-01 PROCEDURE — 36415 COLL VENOUS BLD VENIPUNCTURE: CPT

## 2022-01-01 PROCEDURE — 3017F COLORECTAL CA SCREEN DOC REV: CPT | Performed by: NURSE PRACTITIONER

## 2022-01-01 PROCEDURE — G8536 NO DOC ELDER MAL SCRN: HCPCS | Performed by: NURSE PRACTITIONER

## 2022-01-01 PROCEDURE — 93291 INTERROG DEV EVAL SCRMS IP: CPT | Performed by: INTERNAL MEDICINE

## 2022-01-01 PROCEDURE — 96366 THER/PROPH/DIAG IV INF ADDON: CPT

## 2022-01-01 PROCEDURE — 1101F PT FALLS ASSESS-DOCD LE1/YR: CPT | Performed by: NURSE PRACTITIONER

## 2022-01-01 PROCEDURE — 82728 ASSAY OF FERRITIN: CPT

## 2022-01-01 PROCEDURE — 99214 OFFICE O/P EST MOD 30 MIN: CPT | Performed by: NURSE PRACTITIONER

## 2022-01-01 PROCEDURE — 74011250636 HC RX REV CODE- 250/636: Performed by: INTERNAL MEDICINE

## 2022-01-01 PROCEDURE — G8417 CALC BMI ABV UP PARAM F/U: HCPCS | Performed by: NURSE PRACTITIONER

## 2022-01-01 PROCEDURE — 1101F PT FALLS ASSESS-DOCD LE1/YR: CPT | Performed by: INTERNAL MEDICINE

## 2022-01-01 PROCEDURE — G0463 HOSPITAL OUTPT CLINIC VISIT: HCPCS | Performed by: INTERNAL MEDICINE

## 2022-01-01 PROCEDURE — G8417 CALC BMI ABV UP PARAM F/U: HCPCS | Performed by: INTERNAL MEDICINE

## 2022-01-01 PROCEDURE — 80053 COMPREHEN METABOLIC PANEL: CPT

## 2022-01-01 PROCEDURE — 84425 ASSAY OF VITAMIN B-1: CPT

## 2022-01-01 PROCEDURE — 96365 THER/PROPH/DIAG IV INF INIT: CPT

## 2022-01-01 PROCEDURE — 82607 VITAMIN B-12: CPT

## 2022-01-01 PROCEDURE — 99214 OFFICE O/P EST MOD 30 MIN: CPT | Performed by: INTERNAL MEDICINE

## 2022-01-01 PROCEDURE — 1123F ACP DISCUSS/DSCN MKR DOCD: CPT | Performed by: INTERNAL MEDICINE

## 2022-01-01 PROCEDURE — 83540 ASSAY OF IRON: CPT

## 2022-01-01 PROCEDURE — 85027 COMPLETE CBC AUTOMATED: CPT

## 2022-01-01 PROCEDURE — 74011000250 HC RX REV CODE- 250: Performed by: INTERNAL MEDICINE

## 2022-01-01 PROCEDURE — G8510 SCR DEP NEG, NO PLAN REQD: HCPCS | Performed by: INTERNAL MEDICINE

## 2022-01-01 PROCEDURE — 3017F COLORECTAL CA SCREEN DOC REV: CPT | Performed by: INTERNAL MEDICINE

## 2022-01-01 PROCEDURE — G8427 DOCREV CUR MEDS BY ELIG CLIN: HCPCS | Performed by: NURSE PRACTITIONER

## 2022-01-01 PROCEDURE — G8536 NO DOC ELDER MAL SCRN: HCPCS | Performed by: INTERNAL MEDICINE

## 2022-01-01 PROCEDURE — 86316 IMMUNOASSAY TUMOR OTHER: CPT

## 2022-01-01 PROCEDURE — 1123F ACP DISCUSS/DSCN MKR DOCD: CPT | Performed by: NURSE PRACTITIONER

## 2022-01-01 PROCEDURE — 93298 REM INTERROG DEV EVAL SCRMS: CPT | Performed by: INTERNAL MEDICINE

## 2022-01-01 PROCEDURE — 99406 BEHAV CHNG SMOKING 3-10 MIN: CPT | Performed by: INTERNAL MEDICINE

## 2022-01-01 PROCEDURE — 84443 ASSAY THYROID STIM HORMONE: CPT

## 2022-01-01 PROCEDURE — 82306 VITAMIN D 25 HYDROXY: CPT

## 2022-01-01 PROCEDURE — G8432 DEP SCR NOT DOC, RNG: HCPCS | Performed by: NURSE PRACTITIONER

## 2022-01-01 PROCEDURE — G0439 PPPS, SUBSEQ VISIT: HCPCS | Performed by: NURSE PRACTITIONER

## 2022-01-01 PROCEDURE — 85025 COMPLETE CBC W/AUTO DIFF WBC: CPT

## 2022-01-01 PROCEDURE — G8428 CUR MEDS NOT DOCUMENT: HCPCS | Performed by: INTERNAL MEDICINE

## 2022-01-01 PROCEDURE — 99204 OFFICE O/P NEW MOD 45 MIN: CPT | Performed by: INTERNAL MEDICINE

## 2022-01-01 PROCEDURE — 82105 ALPHA-FETOPROTEIN SERUM: CPT

## 2022-01-01 PROCEDURE — G8427 DOCREV CUR MEDS BY ELIG CLIN: HCPCS | Performed by: INTERNAL MEDICINE

## 2022-01-01 RX ORDER — ACETAMINOPHEN 325 MG/1
650 TABLET ORAL AS NEEDED
Status: CANCELLED
Start: 2022-01-01

## 2022-01-01 RX ORDER — CYCLOBENZAPRINE HCL 5 MG
5 TABLET ORAL
Qty: 30 TABLET | Refills: 1 | Status: SHIPPED | OUTPATIENT
Start: 2022-01-01

## 2022-01-01 RX ORDER — HYDROCORTISONE SODIUM SUCCINATE 100 MG/2ML
100 INJECTION, POWDER, FOR SOLUTION INTRAMUSCULAR; INTRAVENOUS AS NEEDED
Status: CANCELLED | OUTPATIENT
Start: 2022-01-01

## 2022-01-01 RX ORDER — DIPHENHYDRAMINE HYDROCHLORIDE 50 MG/ML
25 INJECTION, SOLUTION INTRAMUSCULAR; INTRAVENOUS AS NEEDED
Status: CANCELLED
Start: 2022-01-01

## 2022-01-01 RX ORDER — DIPHENHYDRAMINE HYDROCHLORIDE 50 MG/ML
50 INJECTION, SOLUTION INTRAMUSCULAR; INTRAVENOUS AS NEEDED
Status: CANCELLED
Start: 2022-01-01

## 2022-01-01 RX ORDER — SODIUM CHLORIDE 0.9 % (FLUSH) 0.9 %
5-40 SYRINGE (ML) INJECTION AS NEEDED
Status: CANCELLED | OUTPATIENT
Start: 2022-01-01

## 2022-01-01 RX ORDER — SODIUM CHLORIDE 9 MG/ML
5-250 INJECTION, SOLUTION INTRAVENOUS AS NEEDED
Status: CANCELLED | OUTPATIENT
Start: 2022-01-01

## 2022-01-01 RX ORDER — SODIUM CHLORIDE 0.9 % (FLUSH) 0.9 %
5-40 SYRINGE (ML) INJECTION AS NEEDED
Status: DISCONTINUED | OUTPATIENT
Start: 2022-01-01 | End: 2022-01-01 | Stop reason: HOSPADM

## 2022-01-01 RX ORDER — EPINEPHRINE 1 MG/ML
0.3 INJECTION, SOLUTION, CONCENTRATE INTRAVENOUS AS NEEDED
Status: CANCELLED | OUTPATIENT
Start: 2022-01-01

## 2022-01-01 RX ORDER — ONDANSETRON 2 MG/ML
8 INJECTION INTRAMUSCULAR; INTRAVENOUS AS NEEDED
Status: CANCELLED | OUTPATIENT
Start: 2022-01-01

## 2022-01-01 RX ORDER — SODIUM CHLORIDE 9 MG/ML
5-40 INJECTION INTRAMUSCULAR; INTRAVENOUS; SUBCUTANEOUS AS NEEDED
Status: CANCELLED | OUTPATIENT
Start: 2022-01-01

## 2022-01-01 RX ORDER — MULTIPLE VITAMINS W/ MINERALS TAB 9MG-400MCG
TAB ORAL
COMMUNITY
Start: 2022-01-01

## 2022-01-01 RX ORDER — HEPARIN 100 UNIT/ML
500 SYRINGE INTRAVENOUS AS NEEDED
Status: CANCELLED
Start: 2022-01-01

## 2022-01-01 RX ORDER — LANOLIN ALCOHOL/MO/W.PET/CERES
50 CREAM (GRAM) TOPICAL DAILY
COMMUNITY

## 2022-01-01 RX ORDER — LANOLIN ALCOHOL/MO/W.PET/CERES
100 CREAM (GRAM) TOPICAL DAILY
COMMUNITY
Start: 2022-01-01 | End: 2022-01-01

## 2022-01-01 RX ORDER — OMEPRAZOLE 20 MG/1
20 CAPSULE, DELAYED RELEASE ORAL 2 TIMES DAILY
COMMUNITY
Start: 2022-01-01 | End: 2022-01-01

## 2022-01-01 RX ORDER — ALBUTEROL SULFATE 0.83 MG/ML
2.5 SOLUTION RESPIRATORY (INHALATION) AS NEEDED
Status: CANCELLED
Start: 2022-01-01

## 2022-01-01 RX ORDER — SODIUM CHLORIDE 0.9 % (FLUSH) 0.9 %
5-40 SYRINGE (ML) INJECTION AS NEEDED
Status: DISPENSED | OUTPATIENT
Start: 2022-01-01 | End: 2022-01-01

## 2022-01-01 RX ORDER — OMEPRAZOLE 20 MG/1
10 CAPSULE, DELAYED RELEASE ORAL DAILY
COMMUNITY

## 2022-01-01 RX ORDER — LANOLIN ALCOHOL/MO/W.PET/CERES
325 CREAM (GRAM) TOPICAL
COMMUNITY
Start: 2022-01-01 | End: 2022-01-01

## 2022-01-01 RX ORDER — FOLIC ACID 1 MG/1
1 TABLET ORAL DAILY
COMMUNITY
Start: 2022-01-01

## 2022-01-01 RX ORDER — SODIUM CHLORIDE 9 MG/ML
5-250 INJECTION, SOLUTION INTRAVENOUS AS NEEDED
Status: DISPENSED | OUTPATIENT
Start: 2022-01-01 | End: 2022-01-01

## 2022-01-01 RX ORDER — LANOLIN ALCOHOL/MO/W.PET/CERES
325 CREAM (GRAM) TOPICAL DAILY
COMMUNITY

## 2022-01-01 RX ORDER — SODIUM CHLORIDE 9 MG/ML
5-250 INJECTION, SOLUTION INTRAVENOUS AS NEEDED
Status: DISCONTINUED | OUTPATIENT
Start: 2022-01-01 | End: 2022-01-01 | Stop reason: HOSPADM

## 2022-01-01 RX ADMIN — SODIUM CHLORIDE 25 ML/HR: 0.9 INJECTION, SOLUTION INTRAVENOUS at 10:14

## 2022-01-01 RX ADMIN — IRON SUCROSE 400 MG: 20 INJECTION, SOLUTION INTRAVENOUS at 09:53

## 2022-01-01 RX ADMIN — IRON SUCROSE 300 MG: 20 INJECTION, SOLUTION INTRAVENOUS at 10:14

## 2022-01-01 RX ADMIN — IRON SUCROSE 300 MG: 20 INJECTION, SOLUTION INTRAVENOUS at 13:20

## 2022-01-01 RX ADMIN — Medication 10 ML: at 10:13

## 2022-01-01 RX ADMIN — SODIUM CHLORIDE, PRESERVATIVE FREE 10 ML: 5 INJECTION INTRAVENOUS at 14:58

## 2022-01-01 RX ADMIN — SODIUM CHLORIDE, PRESERVATIVE FREE 10 ML: 5 INJECTION INTRAVENOUS at 13:18

## 2022-03-17 NOTE — PROGRESS NOTES
[facility]    Mr. Maynor Pacheco is a 80 y.o. male with history of alcohol dependence, DJD, history of subarachnoid hemorrhage, syncope    Patient is here today for follow-up appointment. He continues to have syncopal episodes. He has been having syncopal episode over last 6 years. He claims that he may have had passed out for 5 times since last 6-month ago I saw him. This episodes happens randomly without any warning. He does not recall having been seen by neurology team recently. He is complaining of shortness of breath with moderate exertion which he believes might be getting worse. He admits that he still smokes. He has been smoked for 50 years. He denies chest pain or chest tightness    Past Medical History:   Diagnosis Date    DJD (degenerative joint disease)     ETOH abuse     Eye inflamed     History of loop recorder 05/2020    Subarachnoid hemorrhage ([facility] [address] 09/2016    Syncope        Past Surgical History:   Procedure Laterality Date    COLONOSCOPY N/A 6/17/2019    COLONOSCOPY performed by Carolina Doan MD at [address] ENDOSCOPY    HX CATARACT REMOVAL Left 01/24/2018    TX INSERTION SUBQ CARDIAC RHYTHM MONITOR W/PRGRMG N/A 5/29/2020    LOOP RECORDER INSERT performed by Brigida Souza MD at [address] [place] [address] CATH LAB       Current Outpatient Medications   Medication Sig    lidocaine (Lidoderm) 5 % Apply patch to the affected area for 12 hours a day and remove for 12 hours a day. (Patient not taking: Reported on 9/30/2021)    furosemide (LASIX) 20 mg tablet Take 20 mg by mouth daily. (Patient not taking: Reported on 9/30/2021)    omeprazole (PRILOSEC OTC) 20 mg tablet Take 40 mg by mouth daily. (Patient not taking: Reported on 9/30/2021)    ramipriL (ALTACE) 1.25 mg capsule Take 1.25 mg by mouth daily.  (Patient not taking: Reported on 9/30/2021)    Carboxymethylcellulose-Glycern (Refresh Optive) 0.5-0.9 % drop  (Patient not taking: Reported on 9/30/2021)    diclofenac EC (VOLTAREN) 75 mg EC tablet TAKE ONE TABLET BY MOUTH TWICE A DAY (Patient not taking: Reported on 9/30/2021)     No current facility-administered medications for this visit. Allergies and Sensitivities:  No Known Allergies    Family History:  Family History   Problem Relation Age of Onset   [facility] Cancer Mother     Diabetes Mother     Cancer Father        Social History:  Social History     Tobacco Use    Smoking status: Current Every Day Smoker     Packs/day: 0.25     Years: 45.00     Pack years: 11.25    Smokeless tobacco: Never Used   Substance Use Topics    Alcohol use: Yes     Comment: 3-4 16 oz beers daily    Drug use: No     He  reports that he has been smoking. He has a 11.25 pack-year smoking history. He has never used smokeless tobacco.  He  reports current alcohol use. Review of Systems:  Cardiac symptoms as noted above in HPI. All others negative. Denies fatigue, malaise, skin rash, joint pain, blurring vision, photophobia, neck pain, hemoptysis, chronic cough, nausea, vomiting, hematuria, burning micturition, BRBPR, chronic headaches. Physical Exam:  BP Readings from Last 3 Encounters:   03/17/22 (!) 145/59   09/30/21 (!) 123/57   10/23/20 (!) 153/98         Pulse Readings from Last 3 Encounters:   03/17/22 93   10/04/21 91   09/30/21 83          Wt Readings from Last 3 Encounters:   03/17/22 101.2 kg (223 lb)   10/04/21 98.9 kg (218 lb)   09/30/21 99.1 kg (218 lb 6.4 oz)       Constitutional: Oriented to person, place, and time. HENT: Head: Normocephalic and atraumatic  Neck: No JVD present. Carotid bruit is not appreciated. Cardiovascular: Regular rhythm. No murmur, gallop or rubs appreciated  Lung: Breath sounds normal. No respiratory distress. No ronchi or rales appreciated  Abdominal: No tenderness. No rebound and no guarding. Musculoskeletal: There is no lower extremity edema.  No cynosis      Review of Data  LABS:   Lab Results   Component Value Date/Time    Sodium 138 11/27/2019 03:18 PM    Potassium 3.9 11/27/2019 03:18 PM    Chloride 107 11/27/2019 03:18 PM    CO2 27 11/27/2019 03:18 PM    Glucose 97 11/27/2019 03:18 PM    BUN 12 11/27/2019 03:18 PM    Creatinine 1.04 11/27/2019 03:18 PM     Lipids Latest Ref Rng & Units 8/27/2019   Chol, Total <200 MG/   HDL 40 - 60 MG/DL 56   LDL 0 - 100 MG/. 2(H)   Trig <150 MG/DL 99   Chol/HDL Ratio 0 - 5.0   3.2   Some recent data might be hidden     Lab Results   Component Value Date/Time    ALT (SGPT) 25 11/27/2019 03:18 PM     No results found for: HBA1C, XZS9HYZA, TLA3TYNJ, ICZ9XFAT    EKG    HOLTER (12/19)  [place] was in Sinus . The average heart rate, excluding ectopy, was 87 BPM with a minimum of 60 BPM at 05:49 D2 and a maximum of 153 BPM at 19:21 D2. Heart beats, including ectopy, totaled 669587 beats. VENTRICULAR ECTOPICS totaled 5756 averaging 134. 2 per hour with 4941 single, 58 paired, 349 trigeminy and 0 R on T. BIGEMINY runs occurred  123 times and totaled 399 beats. PVC Walker 2.7%  SUPRAVENTRICULAR ECTOPICS totaled 43 averaging 1.0 per hour ,with 41 single and 2 paired beats. Patient diary was submitted, no symptoms noted. No patient triggered events noted. ECHO (01/20)  Left Ventricle Normal cavity size and wall thickness. Low normal systolic function. The calculated ejection fraction is 50%. Visually measured ejection fraction. Global hypokinesis observed. There is mild (grade 1) left ventricular diastolic dysfunction. Left Atrium Moderately dilated left atrium. Left Atrium volume index is 49 mL/m2. Right Ventricle Normal cavity size and global systolic function. Right Atrium Normal cavity size. Aortic Valve Normal valve structure, no stenosis and no regurgitation. Mitral Valve No stenosis. Mitral valve non-specific thickening. Trace regurgitation. Tricuspid Valve No stenosis.  Tricuspid regurgitation is inadequate for estimation of right ventricular systolic pressure. There is no evidence of pulmonary hypertension. Pulmonic Valve Pulmonic valve not well visualized, but normal doppler findings. Aorta Normal aortic root and ascending aorta. STRESS TEST (01/20)  · Baseline ECG: Normal EKG, non-specific ST-T wave abnormalities. · Gated SPECT: Left ventricular function post-stress was abnormal. Calculated ejection fraction is 48%. · Left ventricular perfusion is probably normal.  · Myocardial perfusion imaging defect 1: There is a defect that is small in size present in the inferior location(s) that is more pronounced with rest than stress imaging. The defect appears to probably be artifact. · There was no convincing evidence of significant reversible defect to suggest on going major ischemia. Inferior defect is most consistent with diaphragmatic attenuation artifact     CATHETERIZATION    IMPRESSION & PLAN:  Mr. Rhea Gonzalez is 80 y.o. male with medical problem as mentioned above    Chest pain:  No chest pain since last visit. nuclear stress test in January 2020, low risk    Cardiomyopathy:  EF 45-50% in 2019. Repeat echo in January 2020, EF 50%  We will repeat echocardiogram especially with slightly worsening dyspnea and recurrent syncope. Syncope:  Patient has been having syncopal episode, unexplained for last 6 years. Echo and stress in 2020, low risk  implantation of loop recorder in May 2020. Last loop recorder interrogation was in December 2021. No tachy or bradycardia arrhythmia was reported  We will proceed with loop recorder interrogation again patient continues to have syncopal episodes since last visit. Unable to establish etiology so far. Could be vasovagal.  Loop recorder has not identified any tachy or bradycardia arrhythmia  I am going to reach out to PCP regarding referral to neurology team and further noncardiac work-up    Tobacco abuse:  Patient is currently smoking 1 pack every 3 days. Smoking for 45 years. Patient educated for consequences/sequela and risk of smoking including but not limited to CAD/PAD/stroke/COPD/lung cancer/other cancer and death. Patient understood completely and expressed and verbalized understanding. Today I had a lengthy discussion with the patient for more than 5 minutes discussing about importance of smoking cessation. Counseling was offered. Patient is working towards that goal.  Medications options were discussed and patient will think about it  I have also requested primary care provider to send him to pulmonary team for PFT and possible COPD and treatment    This plan was discussed with patient who is in agreement. Thank you for allowing me to participate in patient care. Please feel free to call me if you have any question or concern. Alyssa Mai MD  Please note: This document has been produced using voice recognition software. Unrecognized errors in transcription may be present.

## 2022-03-17 NOTE — LETTER
3/17/2022    Patient: Smith Gunn   YOB: 1950   Date of Visit: 3/17/2022     Rica Jackman NP  [address]  [address] [facility] [address]    Dear Rica Jackman NP,      Thank you for referring Mr. Hakeem Vazquez to [facility] for evaluation. My notes for this consultation are attached. If you have questions, please do not hesitate to call me. I look forward to following your patient along with you.       Sincerely,    Vu Zapata MD

## 2022-03-17 NOTE — PROGRESS NOTES
Identified pt with two pt identifiers(name and ). Reviewed record in preparation for visit and have obtained necessary documentation. Lila James presents today for   Chief Complaint   Patient presents with    Follow-up     6m       Pt c/o FRANCISCO. Natalie Arndt preferred language for health care discussion is english/other. Personal Protective Equipment:   Personal Protective Equipment was used including: mask-surgical and hands-gloves. Patient was placed on no precaution(s). Patient was masked. Precautions:   Patient currently on None  Patient currently roomed with door closed. Is someone accompanying this pt? no    Is the patient using any DME equipment during [address]? cane    Depression Screening:  3 most recent PHQ Screens 10/6/2020   PHQ Not Done -   Little interest or pleasure in doing things Not at all   Feeling down, depressed, irritable, or hopeless Not at all   Total Score PHQ 2 0       Learning Assessment:  Learning Assessment 2019   PRIMARY LEARNER Patient   HIGHEST LEVEL OF EDUCATION - PRIMARY LEARNER  -   PRIMARY LANGUAGE ENGLISH   LEARNER PREFERENCE PRIMARY PICTURES     LISTENING     VIDEOS     DEMONSTRATION     READING   ANSWERED BY patient   RELATIONSHIP SELF       Abuse Screening:  Abuse Screening Questionnaire 2020   Do you ever feel afraid of your partner? N   Are you in a relationship with someone who physically or mentally threatens you? N   Is it safe for you to go home? Y       Fall Risk  Fall Risk Assessment, last 12 mths 2021   Able to walk? Yes   Fall in past 12 months? 0   Do you feel unsteady? 0   Are you worried about falling 0   Number of falls in past 12 months -   Fall with injury? -       Pt currently taking Anticoagulant therapy? no  Pt currently taking Antiplatelet therapy? no    Coordination of Care:  1. Have you been to the ER, urgent care clinic since your last visit? Hospitalized since your last visit? no    2.  Have you seen or consulted any other health care providers outside of the [address] [place] [address] since your last visit? Include any pap smears or colon screening. Yes; orhto      Please see Red banners under Allergies and Med Rec to remove outside inquires. All correct information has been verified with patient and added to chart.      Medication's patient's would liked removed has been marked not taking to be removed per Verbal order and read back per Ollie Salinas MD

## 2022-03-23 NOTE — TELEPHONE ENCOUNTER
Please call patient and have him make a follow up with me for syncope/passing out. He has not been seen since 10/2020.  He needs a referral to neurology but I have to see him first.

## 2022-03-23 NOTE — TELEPHONE ENCOUNTER
----- Message from Angelina Murdock MD sent at 3/17/2022 11:00 AM EDT -----  Hello    This gentleman likely need neurology refferal for recurrent likely  non cardiac syncope    Also Dyspnea and still smoking.  May benefit from Pulmonary refferal and PFT and inhalers as well    Thanks

## 2022-03-24 NOTE — TELEPHONE ENCOUNTER
, Ms. Dimple Almaraz, [address] [place] [address] returned your call. She stated she is working in the hospital today and is not doing case management. She provided her contact number and stated you could send her a text or leave a voicemail. That is her work phone # [phone].

## 2022-03-24 NOTE — TELEPHONE ENCOUNTER
1st attempt calling patient in regards of appointment. Patient did not answer. Left message stating to call the office for appointment.

## 2022-03-25 NOTE — TELEPHONE ENCOUNTER
Returned call. Mrs Santi Trujillo will make a follow up visit for mr. Arndt, he needs to be seen for syncope and hip pain. Transferred mrs schaffer to the  to make an appointment. All diagnosis have been discussed with the patient and all of the patient's questions have been answered.

## 2022-04-02 ASSESSMENT — VISUAL ACUITY
OD_CC: 20/30
OS_CC: 20/25
OS_GLARE: 20/400-1
OS_CC: 20/30
OS_CC: 20/200
OD_CC: 20/50
OS_CC: 20/25
OD_GLARE: 20/400
OD_CC: 20/30
OS_CC: 20/20
OS_CC: 20/30
OD_CC: 20/40+1
OD_CC: 20/30
OS_CC: 20/20

## 2022-04-02 ASSESSMENT — TONOMETRY
OS_IOP_MMHG: 12
OS_IOP_MMHG: 10
OD_IOP_MMHG: 13
OS_IOP_MMHG: 10
OD_IOP_MMHG: 10
OS_IOP_MMHG: 12
OS_IOP_MMHG: 14
OD_IOP_MMHG: 14
OD_IOP_MMHG: 10
OS_IOP_MMHG: 13
OD_IOP_MMHG: 12
OD_IOP_MMHG: 14
OS_IOP_MMHG: 9
OS_IOP_MMHG: 11

## 2022-04-08 NOTE — PROGRESS NOTES
Triggered episodes appear sinus mechansm  I have personally seen and evaluated the device findings. Interrogation reviewed and I agree with assessment.     Ramonita Vega

## 2022-04-11 NOTE — PROGRESS NOTES
I have personally seen and evaluated the device findings. Interrogation reviewed and I agree with assessment.     Neha Willis

## 2022-04-26 NOTE — PROGRESS NOTES
Room 7    Did patient bring someone? No    Did the patient have DME equipment? Yes Namita Lala     Did you take your medication today? No       1. \"Have you been to the ER, urgent care clinic since your last visit? Hospitalized since your last visit? \" No    2. \"Have you seen or consulted any other health care providers outside of the [address] [place] [address] since your last visit? \" No     3. For patients aged 39-70: Has the patient had a colonoscopy / FIT/ Cologuard? Yes - no Care Gap present      If the patient is female:    4. For patients aged 41-77: Has the patient had a mammogram within the past 2 years? NA - based on age or sex      11. For patients aged 21-65: Has the patient had a pap smear?  NA - based on age or sex        1 most recent PHQ Screens 4/26/2022   PHQ Not Done -   Little interest or pleasure in doing things Not at all   Feeling down, depressed, irritable, or hopeless Not at all   Total Score PHQ 2 0         Health Maintenance Due   Topic Date Due    COVID-19 Vaccine (1) Never done    Pneumococcal 65+ years (1 - PCV) Never done    DTaP/Tdap/Td series (1 - Tdap) Never done    Shingrix Vaccine Age 50> (1 of 2) Never done    AAA Screening 73-69 YO Male Smoking Patients  Never done    Medicare Yearly Exam  08/27/2020       Learning Assessment 8/27/2019   PRIMARY LEARNER Patient   HIGHEST LEVEL OF EDUCATION - PRIMARY LEARNER  -   PRIMARY LANGUAGE ENGLISH   LEARNER PREFERENCE PRIMARY PICTURES     LISTENING     VIDEOS     DEMONSTRATION     READING   ANSWERED BY patient   RELATIONSHIP SELF

## 2022-04-26 NOTE — PROGRESS NOTES
[address] [place] [address]               [phone]      Juan J Landers is a 80 y.o. male and presents with Follow Up Chronic Condition, Hip Pain, and Dizziness       Assessment/Plan:    Diagnoses and all orders for this visit:    1. Medicare annual wellness visit, subsequent  Completed today to include ETOH and depression screening   2. Advanced care planning/counseling discussion  Healthcare decision maker verified and ACP Discussion performed and documented in note   3. Syncope, unspecified syncope type  -     REFERRAL TO NEUROLOGY  -     METABOLIC PANEL, COMPREHENSIVE; Future  Has had a complete cardiac workup for his syncope, sima has a loop recorder in place  Follow up labs today  Refer to neurology for futher evaluation  4. Anemia, unspecified type  -     CBC W/O DIFF; Future  Follow up labs today  5. SOB (shortness of breath) on exertion  -     REFERRAL TO PULMONARY DISEASE  Per cardiology recommendations will refer to pulmonary for his SOB  6. Alcoholic cirrhosis of liver without ascites ([facility] [address]  Assessment & Plan:   monitored by specialist. No acute findings meriting change in the plan    Orders:  -     METABOLIC PANEL, COMPREHENSIVE; Future      Follow-up and Dispositions    · Return in about 4 months (around 8/26/2022) for HTN, syncope, 30 min, office only. Health Maintenance:   Health Maintenance   Topic Date Due    COVID-19 Vaccine (1) Never done    Pneumococcal 65+ years (1 - PCV) Never done    DTaP/Tdap/Td series (1 - Tdap) Never done    Shingrix Vaccine Age 50> (1 of 2) Never done    AAA Screening 73-69 YO Male Smoking Patients  Never done    Flu Vaccine (Season Ended) 09/01/2022    Depression Screen  04/26/2023    Medicare Yearly Exam  04/27/2023    Lipid Screen  04/27/2027    Colorectal Cancer Screening Combo  10/28/2030    Hepatitis C Screening  Completed        Subjective:    Labs obtained prior to visit?  NO  Reviewed with patient? Not applicable    Syncope  Continues to have syncopal episodes  The problem happens when he is drinking and not drinking  He has had a complete cardiac workup with no etiology found, has a loop recorder in place    SOB with exertion  Per recommendations of cardiology will refer to pulmonary for further evaluation and possible pft's      ROS:     ROS  As stated in HPI, otherwise all others negative. The problem list was updated as a part of today's visit. Patient Active Problem List   Diagnosis Code    Syncope R55    Anemia D64.9    Frequent PVCs I49.3    Dry eye syndrome of bilateral lacrimal glands H04.123    Open angle with borderline findings, low risk, bilateral L87.748    Alcoholic cirrhosis of liver without ascites ([facility] [address] K70.30    Hemangioma of intra-abdominal structure D18.03    Elevated LDL cholesterol level E78.00       The PSH, FH were reviewed. SH:  Social History     Tobacco Use    Smoking status: Current Every Day Smoker     Packs/day: 0.25     Years: 45.00     Pack years: 11.25     Types: Cigarettes    Smokeless tobacco: Never Used   Substance Use Topics    Alcohol use: Yes     Comment: 3-4 16 oz beers occ    Drug use: No       Medications/Allergies:  No current outpatient medications on file prior to visit. No current facility-administered medications on file prior to visit. No Known Allergies    Objective:  Visit Vitals  /60   Pulse 92   Temp 98.8 °F (37.1 °C) (Temporal)   Resp 18   Ht 5' 9\" (1.753 m)   Wt 220 lb (99.8 kg)   SpO2 98%   BMI 32.49 kg/m²    Body mass index is 32.49 kg/m². Physical assessment  Physical Exam  Vitals and nursing note reviewed. Eyes:      Conjunctiva/sclera: Conjunctivae normal.      Pupils: Pupils are equal, round, and reactive to light. Cardiovascular:      Rate and Rhythm: Normal rate and regular rhythm. Heart sounds: Normal heart sounds. No murmur heard. No friction rub. No gallop.     Pulmonary:      Effort: Pulmonary effort is normal.      Breath sounds: Normal breath sounds. Musculoskeletal:         General: Normal range of motion. Cervical back: Normal range of motion. Skin:     General: Skin is warm and dry. Neurological:      Mental Status: He is alert. Labwork and Ancillary Studies:    CBC w/Diff  Lab Results   Component Value Date/Time    WBC 5.5 04/26/2022 02:30 PM    HGB 5.0 (LL) 04/26/2022 02:30 PM    PLATELET 221 30/29/3714 02:30 PM         Basic Metabolic Profile  Lab Results   Component Value Date/Time    Sodium 137 04/26/2022 02:30 PM    Potassium 4.1 04/26/2022 02:30 PM    Chloride 106 04/26/2022 02:30 PM    CO2 26 04/26/2022 02:30 PM    Anion gap 5 04/26/2022 02:30 PM    Glucose 103 (H) 04/26/2022 02:30 PM    BUN 8 04/26/2022 02:30 PM    Creatinine 0.99 04/26/2022 02:30 PM    BUN/Creatinine ratio 8 (L) 04/26/2022 02:30 PM    GFR est AA >60 04/26/2022 02:30 PM    GFR est non-AA >60 04/26/2022 02:30 PM    Calcium 8.8 04/26/2022 02:30 PM        Cholesterol  Lab Results   Component Value Date/Time    Cholesterol, total 179 08/27/2019 03:53 PM    HDL Cholesterol 56 08/27/2019 03:53 PM    LDL, calculated 103.2 (H) 08/27/2019 03:53 PM    Triglyceride 99 08/27/2019 03:53 PM    CHOL/HDL Ratio 3.2 08/27/2019 03:53 PM           I have discussed the diagnosis with the patient and the intended plan as seen in the above orders. The patient has received an After-Visit Summary and questions were answered concerning future plans. An After Visit Summary was printed and given to the patient. All diagnosis have been discussed with the patient and all of the patient's questions have been answered. Follow-up and Dispositions    · Return in about 4 months (around 8/26/2022) for HTN, syncope, 30 min, office only. Pastora Ivy, [facility]  [address] [id number] [facility] [address] [address]  [id number]      This is the Subsequent Medicare Annual Wellness Exam, performed 12 months or more after the Initial AWV or the last Subsequent AWV    I have reviewed the patient's medical history in detail and updated the computerized patient record. Assessment/Plan   Education and counseling provided:  Are appropriate based on today's review and evaluation    1. Medicare annual wellness visit, subsequent  2. Advanced care planning/counseling discussion  3. Syncope, unspecified syncope type  -     REFERRAL TO NEUROLOGY  -     METABOLIC PANEL, COMPREHENSIVE; Future  4. Anemia, unspecified type  -     CBC W/O DIFF; Future  5. SOB (shortness of breath) on exertion  -     REFERRAL TO PULMONARY DISEASE  6. Alcoholic cirrhosis of liver without ascites ([facility] [address]  Assessment & Plan:   monitored by specialist. No acute findings meriting change in the plan  Orders:  -     METABOLIC PANEL, COMPREHENSIVE; Future       Depression Risk Factor Screening     3 most recent PHQ Screens 4/26/2022   PHQ Not Done -   Little interest or pleasure in doing things Not at all   Feeling down, depressed, irritable, or hopeless Not at all   Total Score PHQ 2 0       Alcohol & Drug Abuse Risk Screen    Do you average more than 1 drink per night or more than 7 drinks a week: Yes    In the past three months have you have had more than 4 drinks containing alcohol on one occasion: No          Functional Ability and Level of Safety    Hearing: Hearing is good. Activities of Daily Living: The home contains: no safety equipment. Patient needs help with:  transportation, housework and managing medications      Ambulation: with difficulty, uses a cane     Fall Risk:  Fall Risk Assessment, last 12 mths 4/26/2022   Able to walk? Yes   Fall in past 12 months? 0   Do you feel unsteady? 0   Are you worried about falling 0   Number of falls in past 12 months -   Fall with injury?  -      Abuse Screen:  Patient is not abused       Cognitive Screening    Has your family/caregiver stated any concerns about your memory: no     Cognitive Screening: Normal - Clock Drawing Test    Health Maintenance Due     Health Maintenance Due   Topic Date Due    COVID-19 Vaccine (1) Never done    Pneumococcal 65+ years (1 - PCV) Never done    DTaP/Tdap/Td series (1 - Tdap) Never done    Shingrix Vaccine Age 50> (1 of 2) Never done    AAA Screening 73-69 YO Male Smoking Patients  Never done       Patient Care Team   Patient Care Team:  Radha Perry NP as PCP - General (Nurse Practitioner)  Radha Perry NP as PCP - [facility] Empaneled Provider    History     Patient Active Problem List   Diagnosis Code    Syncope R55    Anemia D64.9    Frequent PVCs I49.3    Dry eye syndrome of bilateral lacrimal glands H04.123    Open angle with borderline findings, low risk, bilateral A92.136    Alcoholic cirrhosis of liver without ascites ([facility] [address] K70.30    Hemangioma of intra-abdominal structure D18.03    Elevated LDL cholesterol level E78.00     Past Medical History:   Diagnosis Date    DJD (degenerative joint disease)     ETOH abuse     Eye inflamed     History of loop recorder 05/2020    Subarachnoid hemorrhage ([facility] [address] 09/2016    Syncope       Past Surgical History:   Procedure Laterality Date    COLONOSCOPY N/A 6/17/2019    COL[facility] Shar[facility] Stein [facility] ENDOSCOPY    HX CATARACT REMOVAL Left 01/24/2018    RI INSERTION SUBQ CARDIAC RHYTHM MONITOR W/PRGRMG N/A 5/29/2020    LOOP RECORDER INSERT performed by Gibran Byrnes MD at [address] [place] [address] CATH LAB       No Known Allergies    Family History   Problem Relation Age of Onset    Cancer Mother     Diabetes Mother     Cancer Father      Social History     Tobacco Use    Smoking status: Current Every Day Smoker     Packs/day: 0.25     Years: 45.00     Pack years: 11.25     Types: Cigarettes    Smokeless tobacco: Never Used   Substance Use Topics    Alcohol use: Yes     Comment: 3-4 16 oz beers bib El NP

## 2022-04-26 NOTE — ACP (ADVANCE CARE PLANNING)
Advance Care Planning     General Advance Care Planning (ACP) Conversation      Date of Conversation: 4/26/2022  Conducted with: Patient with Decision Making Capacity    Healthcare Decision Maker:     Primary Decision Maker:  Subhash Lo - [phone]  Click here to complete [address] including selection of the [address] Relationship (ie \"Primary\")      Today we documented Decision Maker(s) consistent with Legal Next of Kin hierarchy.     Content/Action Overview:   Has NO ACP documents/care preferences - information provided, considering goals and options  Reviewed DNR/DNI and patient elects Full Code (Attempt Resuscitation)  Topics discussed: resuscitation preferences       Length of Voluntary ACP Conversation in minutes:  <16 minutes (Non-Billable)    Mahad Ramon NP

## 2022-04-26 NOTE — PATIENT INSTRUCTIONS
Make follow up appointment with Dr. Andre Vaughan for your liver. Medicare Wellness Visit, Male    The best way to live healthy is to have a lifestyle where you eat a well-balanced diet, exercise regularly, limit alcohol use, and quit all forms of tobacco/nicotine, if applicable. Regular preventive services are another way to keep healthy. Preventive services (vaccines, screening tests, monitoring & exams) can help personalize your care plan, which helps you manage your own care. Screening tests can find health problems at the earliest stages, when they are easiest to treat. [facility]i follows the current, evidence-based guidelines published by the [facility] States Jake Flaquita ([facility]) when recommending preventive services for our patients. Because we follow these guidelines, sometimes recommendations change over time as research supports it. (For example, a prostate screening blood test is no longer routinely recommended for men with no symptoms). Of course, you and your doctor may decide to screen more often for some diseases, based on your risk and co-morbidities (chronic disease you are already diagnosed with). Preventive services for you include:  - Medicare offers their members a free annual wellness visit, which is time for you and your primary care provider to discuss and plan for your preventive service needs. Take advantage of this benefit every year!  -All adults over age 72 should receive the recommended pneumonia vaccines. Current USPSTF guidelines recommend a series of two vaccines for the best pneumonia protection.   -All adults should have a flu vaccine yearly and tetanus vaccine every 10 years.  -All adults age 48 and older should receive the shingles vaccines (series of two vaccines).        -All adults age 38-68 who are overweight should have a diabetes screening test once every three years.   -Other screening tests & preventive services for persons with diabetes include: an eye exam to screen for diabetic retinopathy, a kidney function test, a foot exam, and stricter control over your cholesterol.   -Cardiovascular screening for adults with routine risk involves an electrocardiogram (ECG) at intervals determined by the provider.   -Colorectal cancer screening should be done for adults age 54-65 with no increased risk factors for colorectal cancer. There are a number of acceptable methods of screening for this type of cancer. Each test has its own benefits and drawbacks. Discuss with your provider what is most appropriate for you during your annual wellness visit. The different tests include: colonoscopy (considered the best screening method), a fecal occult blood test, a fecal DNA test, and sigmoidoscopy.  -All adults born between [facility] should be screened once for Hepatitis C.  -An Abdominal Aortic Aneurysm (AAA) Screening is recommended for men age 73-68 who has ever smoked in their lifetime.      Here is a list of your current Health Maintenance items (your personalized list of preventive services) with a due date:  Health Maintenance Due   Topic Date Due    COVID-19 Vaccine (1) Never done    Pneumococcal Vaccine (1 - PCV) Never done    DTaP/Tdap/Td  (1 - Tdap) Never done    Shingles Vaccine (1 of 2) Never done    AAA Screening  Never done

## 2022-04-27 NOTE — TELEPHONE ENCOUNTER
Called pt and let him know his hgb is 5. He understands the implications and knows he needs to go to the ED. Advised he go tonight. He verbalized understanding.

## 2022-04-27 NOTE — PROGRESS NOTES
Received call at 0806 from [facility] of a critical hemoglobin for patient. Patients name and  verified and critical hemoglobin of 5.0 read back to Audrey, . Notified Ciarra Frey NP of the results.

## 2022-05-20 PROBLEM — Z91.81 AT HIGH RISK FOR FALLS: Status: ACTIVE | Noted: 2022-01-01

## 2022-05-20 NOTE — PATIENT INSTRUCTIONS

## 2022-05-20 NOTE — PROGRESS NOTES
Room 4    Patient states I can not walk far . When asked if patient has seen the Neurologist and Pulmonologist patient states no . Patient referral order has been re-printed and address has been highlighted for patient. When asked if patient has any concerns he would like to address with HAZEL Alexandra patient states no . Did patient bring someone? No    Did the patient have DME equipment? Yes Cane. Did you take your medication today? Patient states No I forgot . 1. \"Have you been to the ER, urgent care clinic since your last visit? Hospitalized since your last visit? \" Yes Patient states I went to [facility] for getting blood     2. \"Have you seen or consulted any other health care providers outside of the [address] [place] [address] since your last visit? \" No     3. For patients aged 39-70: Has the patient had a colonoscopy / FIT/ Cologuard? Yes - no Care Gap present      If the patient is female:    4. For patients aged 41-77: Has the patient had a mammogram within the past 2 years? NA - based on age or sex      11. For patients aged 21-65: Has the patient had a pap smear?  NA - based on age or sex        1 most recent PHQ Screens 5/20/2022   PHQ Not Done -   Little interest or pleasure in doing things Not at all   Feeling down, depressed, irritable, or hopeless Not at all   Total Score PHQ 2 0         Health Maintenance Due   Topic Date Due    COVID-19 Vaccine (1) Never done    Pneumococcal 65+ years (1 - PCV) Never done    DTaP/Tdap/Td series (1 - Tdap) Never done    Shingrix Vaccine Age 50> (1 of 2) Never done    AAA Screening 73-67 YO Male Smoking Patients  Never done       Learning Assessment 8/27/2019   PRIMARY LEARNER Patient   HIGHEST LEVEL OF EDUCATION - PRIMARY LEARNER  -   PRIMARY LANGUAGE ENGLISH   LEARNER PREFERENCE PRIMARY PICTURES     LISTENING     VIDEOS     DEMONSTRATION     READING   ANSWERED BY patient   RELATIONSHIP SELF

## 2022-05-20 NOTE — PROGRESS NOTES
[address] [place] [address]               [phone]      Marci Gerber is a 80 y.o. male and presents with Hospital Follow Up       Assessment/Plan:    Diagnoses and all orders for this visit:    1. Iron deficiency anemia due to chronic blood loss  -     CBC W/O DIFF; Future  Recent hospitalized for hgb of 5  Received total of 3 units PRBC's  Is to have capsule endoscopy with Dr. Tito Shen  Follow up labs today  2. Syncope, unspecified syncope type  States he feels better since his hospitalization and denies any further syncopy  Has had a complete cardiac workup with loop recorder placement, all of which has been negative  He is to follow up with neurology, gave him the number to call to make appointment. 3. Liver mass  -     REFERRAL TO ONCOLOGY  Has pm of duodenal neuroendocrine tumor  Noted some masses on his liver and a retroperitoneal mass while hospitalized  Due follow up with oncology, referral placed  4. Lung nodule  -     CT CHEST WO CONT; Future  Nodules noted on pet scan 4/2021, recommended follow up ct chest ordered  5. At high risk for falls  He is using a cane for assistance with ambulation      Follow-up and Dispositions    · Return in about 3 months (around 8/20/2022) for anemia, syncope, 15 min, office only.            Health Maintenance:   Health Maintenance   Topic Date Due    COVID-19 Vaccine (1) Never done    Pneumococcal 65+ years (1 - PCV) Never done    DTaP/Tdap/Td series (1 - Tdap) Never done    Shingrix Vaccine Age 50> (1 of 2) Never done    AAA Screening 73-67 YO Male Smoking Patients  Never done    Flu Vaccine (Season Ended) 09/01/2022    Depression Screen  04/26/2023    Medicare Yearly Exam  04/27/2023    Lipid Screen  04/27/2027    Colorectal Cancer Screening Combo  10/28/2030    Hepatitis C Screening  Completed        Subjective:    Labs obtained prior to visit? NO  Reviewed with patient? Not applicable    anemia  Last visit found to have hgb 5  Went to ed and was admitted  Received 3 units prbc, egd negative  Found spots on his liver, ? Mets, afp and cea negative  Found retroperitoneal mass: outpatitne bx possible  Referral to oncology placed at this visit  Taking iron daily  Recheck cbc today    Has appointment to follow up with GI  He is to be set up for a capsule endoscopy  Dr. Lucas Aggarwal on pet scan 4/2021, he was due to have ct chest in 6 months but had not kept follow up with me  Denies sob and difficutly breathing  Some difficulty ambulating due to his hip pain, using a cane  Ambulatory pulse ox show sats staying greater than 95%  Referral to pulmonary has been placed for sob, he will be given number to call    Syncope  Continues to have syncopal events  This has been ongoing for 5 years  He has had a complete cardiac workup with a loop recorder insertion  All finding have been negative for cardiac etiology  Referral for neurology has been placed, he will be given number to call and make appt  ROS:     ROS  As stated in HPI, otherwise all others negative. The problem list was updated as a part of today's visit. Patient Active Problem List   Diagnosis Code    Syncope R55    Anemia D64.9    Frequent PVCs I49.3    Dry eye syndrome of bilateral lacrimal glands H04.123    Open angle with borderline findings, low risk, bilateral P89.409    Alcoholic cirrhosis of liver without ascites ([facility] [address] K70.30    Hemangioma of intra-abdominal structure D18.03    Elevated LDL cholesterol level E78.00    At high risk for falls Z91.81       The PSH, FH were reviewed.       SH:  Social History     Tobacco Use    Smoking status: Current Every Day Smoker     Packs/day: 0.25     Years: 45.00     Pack years: 11.25     Types: Cigarettes    Smokeless tobacco: Never Used   Substance Use Topics    Alcohol use: Yes     Comment: 3-4 16 oz beers occ    Drug use: No       Medications/Allergies:  Current Outpatient Medications on File Prior to Visit   Medication Sig Dispense Refill    ferrous sulfate 325 mg (65 mg iron) tablet Take 325 mg by mouth.  folic acid (FOLVITE) 1 mg tablet       Therapy M 9 mg iron-400 mcg tab tablet       omeprazole (PRILOSEC) 20 mg capsule Take 20 mg by mouth two (2) times a day.  thiamine HCL (B-1) 100 mg tablet Take 100 mg by mouth daily. No current facility-administered medications on file prior to visit. No Known Allergies    Objective:  Visit Vitals  /64 (BP 1 Location: Right arm, BP Patient Position: Sitting, BP Cuff Size: Small adult) Comment (BP Patient Position): feet flat on floor   Pulse 81   Temp 98.1 °F (36.7 °C) (Temporal)   Resp 20   Ht 5' 9\" (1.753 m)   Wt 223 lb (101.2 kg)   SpO2 99%   BMI 32.93 kg/m²    Body mass index is 32.93 kg/m². Physical assessment  Physical Exam  Vitals and nursing note reviewed. Eyes:      Conjunctiva/sclera: Conjunctivae normal.      Pupils: Pupils are equal, round, and reactive to light. Cardiovascular:      Rate and Rhythm: Normal rate and regular rhythm. Heart sounds: Normal heart sounds. No murmur heard. No friction rub. No gallop. Pulmonary:      Effort: Pulmonary effort is normal.      Breath sounds: Normal breath sounds. Musculoskeletal:         General: Normal range of motion. Cervical back: Normal range of motion. Skin:     General: Skin is warm and dry. Neurological:      Mental Status: He is alert.            Labwork and Ancillary Studies:    CBC w/Diff  Lab Results   Component Value Date/Time    WBC 5.5 04/26/2022 02:30 PM    HGB 5.0 (LL) 04/26/2022 02:30 PM    PLATELET 603 47/97/1737 02:30 PM         Basic Metabolic Profile  Lab Results   Component Value Date/Time    Sodium 137 04/26/2022 02:30 PM    Potassium 4.1 04/26/2022 02:30 PM    Chloride 106 04/26/2022 02:30 PM    CO2 26 04/26/2022 02:30 PM    Anion gap 5 04/26/2022 02:30 PM    Glucose 103 (H) 04/26/2022 02:30 PM    BUN 8 04/26/2022 02:30 PM    Creatinine 0.99 04/26/2022 02:30 PM    BUN/Creatinine ratio 8 (L) 04/26/2022 02:30 PM    GFR est AA >60 04/26/2022 02:30 PM    GFR est non-AA >60 04/26/2022 02:30 PM    Calcium 8.8 04/26/2022 02:30 PM        Cholesterol  Lab Results   Component Value Date/Time    Cholesterol, total 179 08/27/2019 03:53 PM    HDL Cholesterol 56 08/27/2019 03:53 PM    LDL, calculated 103.2 (H) 08/27/2019 03:53 PM    Triglyceride 99 08/27/2019 03:53 PM    CHOL/HDL Ratio 3.2 08/27/2019 03:53 PM           I have discussed the diagnosis with the patient and the intended plan as seen in the above orders. The patient has received an After-Visit Summary and questions were answered concerning future plans. An After Visit Summary was printed and given to the patient. All diagnosis have been discussed with the patient and all of the patient's questions have been answered. Follow-up and Dispositions    · Return in about 3 months (around 8/20/2022) for anemia, syncope, 15 min, office only. Lia Uriostegui, [facility]  [address] [id number] [facility] [address]  [address]

## 2022-06-09 NOTE — PROGRESS NOTES
Hematology/Oncology  Consult Note    Name: Patrica Briseno  Date: 2022  : 1950  Primary Care Provider: Walker Babcock NP    Mr. Angel Bland is a 80y.o. year old male with iron deficiency anemia secondary to upper GI loss. He has had endoscopy which revealed multiple scarring from bleeding esophageal varices. Patient does have cirrhosis as well. He does follow-up with gastroenterologist regarding his liver function. Patient was recently admitted to [facility] where he had endoscopy several times had imaging of the abdomen. During the imaging he had a CAT scan lesions in the liver which MRI is suggesting it is a hemangioma that would be consistent with hemangioma normal as observed at [facility] scans in the past.  However he was also noted to have left    Aortic enlarging lesion the lymph nodes [place] [facility] not sure. The [facility] surgical oncology is planning an outpatient biopsy of this area. And that was in May we do not have any evidence of pathology so far. Patient has osteoarthritis of her hip and is hoping to have a hip replacement. We shall obtain several blood tests to see whether the patient needs intravenous iron replacement in addition to his oral iron replacement. She also do tumor markers such as of a fetoprotein plan chromogranin A    Current Therapy: Evaluation    Subjective: The past medical, surgical and social history has been reviewed and remains unchanged.    Past Medical History:   Diagnosis Date    Anemia     DJD (degenerative joint disease)     ETOH abuse     Eye inflamed     History of loop recorder 2020    Subarachnoid hemorrhage ([facility] 2016    Syncope      Past Surgical History:   Procedure Laterality Date    COLONOSCOPY N/A 2019    COLONOSCOPY performed by Julio [facility]ccormack [facility] at [facility] ENDOSCOPY    HX CATARACT REMOVAL Left 2018    HX ENDOSCOPY  2022    EGD    ND INSERTION SUBQ CARDIAC RHYTHM MONITOR W/PRGRMG N/A 5/29/2020    LOOP RECORDER INSERT performed by Maryanne Bustos MD at [facility] CARDIAC CATH LAB     Social History     Socioeconomic History    Marital status:      Spouse name: Not on file    Number of children: Not on file    Years of education: Not on file    Highest education level: Not on file   Occupational History    Not on file   Tobacco Use    Smoking status: Former Smoker     Packs/day: 0.25     Years: 45.00     Pack years: 11.25     Types: Cigarettes    Smokeless tobacco: Never Used   Vaping Use    Vaping Use: Never used   Substance and Sexual Activity    Alcohol use: Yes     Comment: 3-4 16 oz beers occ    Drug use: No    Sexual activity: Not Currently   Other Topics Concern    Not on file   Social History Narrative    Not on file     Social Determinants of Health     Financial Resource Strain:     Difficulty of Paying Living Expenses: Not on file   Food Insecurity:     Worried About Running Out of Food in the Last Year: Not on file    Wilfredo of Food in the Last Year: Not on file   Transportation Needs:     Lack of Transportation (Medical): Not on file    Lack of Transportation (Non-Medical):  Not on file   Physical Activity:     Days of Exercise per Week: Not on file    Minutes of Exercise per Session: Not on file   Stress:     Feeling of Stress : Not on file   Social Connections:     Frequency of Communication with Friends and Family: Not on file    Frequency of Social Gatherings with Friends and Family: Not on file    Attends Catholic Services: Not on file    Active Member of Clubs or Organizations: Not on file    Attends Club or Organization Meetings: Not on file    Marital Status: Not on file   Intimate Partner Violence:     Fear of Current or Ex-Partner: Not on file    Emotionally Abused: Not on file    Physically Abused: Not on file    Sexually Abused: Not on file   Housing Stability:     Unable to Pay for Housing in the Last Year: Not on file    Number of Places Lived in the Last Year: Not on file    Unstable Housing in the Last Year: Not on file     Family History   Problem Relation Age of Onset    Cancer Mother     Diabetes Mother     Breast Cancer Mother     Cancer Father     Heart Disease Brother      Current Outpatient Medications   Medication Sig Dispense Refill    ferrous sulfate 325 mg (65 mg iron) tablet Take 325 mg by mouth daily.  omeprazole (PRILOSEC) 20 mg capsule Take 10 mg by mouth daily.  thiamine HCL (B-1) 100 mg tablet Take 50 mg by mouth daily.  folic acid (FOLVITE) 1 mg tablet Take 1 mg by mouth daily.  Therapy M 9 mg iron-400 mcg tab tablet          Review of Systems:    General :The patient has no complaints except for hip pain. Patient uses a cane to ambulate and he was willing wheeled into the clinic on wheelchair however he walked out with a cane    Psychological : patient denies having any psychological symptoms such as hallucinations depression or anxiety. Ophthalmic:the patient denies having any visual impairment or eye discomfort. ENT: there are no abnormalities reported. Allergy and Immunology:the patient denies having any seasonal allergies or allergies to medications other than those already outlined above. Hematological and Lymphatic: the patient denies having any bruising, bleeding or lymphadenopathy. Endocrine: the patient denies having any heat or cold intolerance. There is no history of diabetes or thyroid disorders. Breast: the patient denies having any history of breast mass or lumps. Respiratory:the patient denies having any cough, shortness of breath, or dyspnea on exertion. Cardiovascular: there are no complaints of chest pain, palpitations, chest pounding, or dyspnea on exertion. Gastrointestinal: the patient denies having nausea, emesis, diarrhea, constipation, or blood in the stool.      Genito-Urinary: the patient denies having urinary urgency, frequency, or dysuria. Musculoskeletal: with the exception of mild arthralgias the patient has no other musculoskeletal complaints. Neurological:  denies having any numbness, tingling, or neurologic deficits. Dermatological: patient denies having any unexplained rash, skin ulcerations, or hives. Objective:     Visit Vitals  /74   Pulse 92   Temp 98 °F (36.7 °C)   Resp 19   Ht 5' 9\" (1.753 m)   Wt 101.7 kg (224 lb 3.2 oz)   SpO2 97%   BMI 33.11 kg/m²     Pain Score:4    Physical Exam:     ECO    General: Chronically ill-appearing    Psychologic: mood and affect are appropriate, no anxiety or depression noted    Skin: examination of the skin reveals no bruising, rash or petechiae    HEENT: Normocephalic, atraumatic. Conjunctiva and sclera are clear. Pupils are equal, round and reactive to light. EOMs are intact. ENT without oral mucosal lesions, stomatitis or thrush    Neck: supple without lymphadenopathy, JVD or thyromegaly    Lymphatics: no palpable cervical, supraclavicular, infraclavicular, axillary or inguinal lymphadenopathy    Lungs: clear breath sounds bilaterally, no rhonchi or wheezes noted    Heart: Regular rate and rhythm, no murmurs, rubs or gallops, S1-S2 noted. Abdomen: soft, non-tender, non-distended, no HSM    Extremities: without clubbing, cyanosis or edema    Neurologic: no focal deficits, steady gait, Alert and oriented x 3. Laboratory Data:     Results for orders placed or performed during the hospital encounter of 19   CBC WITH 3 PART DIFF     Status: Abnormal   Result Value Ref Range Status    WBC 4.5 4.5 - 13.0 K/uL Final    RBC 4.14 4.10 - 5.10 M/uL Final    HGB 10.9 (L) 12.0 - 16.0 g/dL Final    HCT 37.7 36 - 48 % Final    MCV 91.1 78 - 102 FL Final    MCH 26.3 25.0 - 35.0 PG Final    MCHC 28.9 (L) 31 - 37 g/dL Final    RDW 25.6 (H) 11.5 - 14.5 % Final    NEUTROPHILS 58 40 - 70 % Final    Mixed cells 18 (H) 0.1 - 17 % Final    LYMPHOCYTES 25 14 - 44 % Final    ABS. NEUTROPHILS 2.6 1.8 - 9.5 K/UL Final    ABS. MIXED CELLS 0.8 0.0 - 2.3 K/uL Final    ABS. LYMPHOCYTES 1.1 1.1 - 5.9 K/UL Final     Comment: Test performed at [address] [place] [address] or Outpatient Infusion Center Location. Reviewed by Medical Director. DF AUTOMATED   Final       Patient Active Problem List   Diagnosis Code    Syncope R55    Anemia D64.9    Frequent PVCs I49.3    Dry eye syndrome of bilateral lacrimal glands H04.123    Open angle with borderline findings, low risk, bilateral L09.552    Alcoholic cirrhosis of liver without ascites ([facility] [address] K70.30    Hemangioma of intra-abdominal structure D18.03    Elevated LDL cholesterol level E78.00    At high risk for falls Z91.81         Assessment:     1. Iron deficiency anemia due to chronic blood loss    2. Alcoholic cirrhosis of liver without ascites ([facility] [address] [facility]    3. Hemangioma of liver    4. Primary osteoarthritis of hip, unspecified laterality    5. Unsteady gait when walking    6. Esophageal varices determined by endoscopy ([facility] [address] [facility]    7. Vitamin D deficiency    8. Vitamin B 12 deficiency    9. Thiamine deficiency    10. Other specified hypothyroidism        Plan:     1. Blood test today mainly to assess patient iron stores but also to perform AFP and chromogranin A.  2. Patient is following up with PCP,  Pulmonary and with GI, as well as Ortho  3. We shall follow-up with the patient in 6 weeks unless there is abnormality in the labs requiring earlier visit. 4. We may plan a CAT scan to be repeated in the future and if necessary we shall follow-up with the biopsy by IR if it has not been performed by [facility] surgical oncology. 5. Patient had opportunity to ask questions which were answered. 6. Patient is in agreement with this plan    Follow-up and Dispositions  ·   Return in about 6 weeks (around 7/21/2022) for Follow_up In Person.         Orders Placed This Encounter    CBC WITH AUTOMATED DIFF     Standing Status:   Future Standing Expiration Date:   6/10/2023    FERRITIN     Standing Status:   Future     Standing Expiration Date:   6/10/2023    VITAMIN D, 25 HYDROXY     Standing Status:   Future     Standing Expiration Date:   6/10/2023    VITAMIN B12 & FOLATE     Standing Status:   Future     Standing Expiration Date:   6/10/2023    TSH 3RD GENERATION     Standing Status:   Future     Standing Expiration Date:   6/49/9399    METABOLIC PANEL, COMPREHENSIVE     Standing Status:   Future     Standing Expiration Date:   6/10/2023    IRON PROFILE     Standing Status:   Future     Standing Expiration Date:   6/10/2023    VITAMIN B1, WHOLE BLOOD     Standing Status:   Future     Standing Expiration Date:   6/10/2023    CHROMOGRANIN A     Standing Status:   Future     Standing Expiration Date:   6/10/2023    AFP, TUMOR MARKER     Standing Status:   Future     Standing Expiration Date:   6/10/2023     Order Specific Question:   Patient Race? Answer:   Black     Order Specific Question:   Patient Weight     Answer:   101.7    ferrous sulfate 325 mg (65 mg iron) tablet     Sig: Take 325 mg by mouth daily.  omeprazole (PRILOSEC) 20 mg capsule     Sig: Take 10 mg by mouth daily.  thiamine HCL (B-1) 100 mg tablet     Sig: Take 50 mg by mouth daily.        Reuben Whitten MD  6/9/2022

## 2022-06-30 NOTE — PROGRESS NOTES
Identified pt with two pt identifiers(name and ). Reviewed record in preparation for visit and have obtained necessary documentation. Krishna Green presents today for   Chief Complaint   Patient presents with    Follow-up       Pt denies DIZZINESS, SOB, CHEST PAIN/ PRESSURE, FATIGUE/WEAKNESS, HEADACHES, SWELLING. Sharonda Arndt preferred language for health care discussion is english/other. Personal Protective Equipment:   Personal Protective Equipment was used including: mask-surgical and hands-gloves. Patient was placed on no precaution(s). Patient was masked. Precautions:   Patient currently on None  Patient currently roomed with door closed. Is someone accompanying this pt? no    Is the patient using any DME equipment during [address]? no    Depression Screening:  3 most recent PHQ Screens 2022   PHQ Not Done -   Little interest or pleasure in doing things Not at all   Feeling down, depressed, irritable, or hopeless Not at all   Total Score PHQ 2 0       Learning Assessment:  Learning Assessment 2019   PRIMARY LEARNER Patient   HIGHEST LEVEL OF EDUCATION - PRIMARY LEARNER  -   PRIMARY LANGUAGE ENGLISH   LEARNER PREFERENCE PRIMARY PICTURES     LISTENING     VIDEOS     DEMONSTRATION     READING   ANSWERED BY patient   RELATIONSHIP SELF       Abuse Screening:  Abuse Screening Questionnaire 2020   Do you ever feel afraid of your partner? N   Are you in a relationship with someone who physically or mentally threatens you? N   Is it safe for you to go home? Y       Fall Risk  Fall Risk Assessment, last 12 mths 2022   Able to walk? Yes   Fall in past 12 months? 0   Do you feel unsteady? 0   Are you worried about falling 0   Is TUG test greater than 12 seconds?  -   Is the gait abnormal? -   Number of falls in past 12 months -   Fall with injury? -       Pt currently taking Anticoagulant therapy? no  Pt currently taking Antiplatelet therapy? no    Coordination of Care:  1. Have you been to the ER, urgent care clinic since your last visit? Hospitalized since your last visit? no    2. Have you seen or consulted any other health care providers outside of the [address] [place] [address] since your last visit? Include any pap smears or colon screening. no      Please see Red banners under Allergies and Med Rec to remove outside inquires. All correct information has been verified with patient and added to chart.      Medication's patient's would liked removed has been marked not taking to be removed per Verbal order and read back per Berry Mix MD

## 2022-06-30 NOTE — PROGRESS NOTES
[facility]    Mr. William Darnell is a 80 y.o. male with history of alcohol dependence, cirrhosis, esophageal varices DJD, history of subarachnoid hemorrhage, syncope    Patient is here today for follow-up appointment. And was admitted to hospital recently after having dyspnea. He was found to have a severe anemia with hemoglobin of 5 requiring 3 blood transfusion. During hospitalization patient had echocardiogram.  Patient states that since blood transfusion his dyspnea has improved. He denies any chest pain or chest tightness. Since hospital discharge, he does not have any presyncope or syncope. Past Medical History:   Diagnosis Date    Anemia     Cirrhosis ([address]     DJD (degenerative joint disease)     Esophageal varices (HCC)     ETOH abuse     Eye inflamed     GI bleed     Requiring BT    History of loop recorder 05/2020    Subarachnoid hemorrhage ([facility] [address] 09/2016    Syncope        Past Surgical History:   Procedure Laterality Date    COLONOSCOPY N/A 6/17/2019    COLONOSCOPY performed [facility] Morrissey MD at [facility] ENDOSCOPY    HX CATARACT REMOVAL Left 01/24/2018    HX ENDOSCOPY  05/02/2022    EGD    [place] INSERTION SUBQ CARDIAC RHYTHM MONITOR W/PRGRMG N/A 5/29/2020    LOOP RECORDER INSERT performed by Maryanne Bustos MD at [facility] CATH LAB       Current Outpatient Medications   Medication Sig    ferrous sulfate 325 mg (65 mg iron) tablet Take 325 mg by mouth daily.  omeprazole (PRILOSEC) 20 mg capsule Take 10 mg by mouth daily.  thiamine HCL (B-1) 100 mg tablet Take 50 mg by mouth daily.  folic acid (FOLVITE) 1 mg tablet Take 1 mg by mouth daily.  Therapy M 9 mg iron-400 mcg tab tablet      No current facility-administered medications for this visit.        Allergies and Sensitivities:  No Known Allergies    Family History:  Family History   Problem Relation Age of Onset    Cancer Mother     Diabetes Mother     Breast Cancer Mother     Cancer Father     Heart Disease Brother        Social History:  Social History     Tobacco Use    Smoking status: Former Smoker     Packs/day: 0.25     Years: 45.00     Pack years: 11.25     Types: Cigarettes    Smokeless tobacco: Never Used   Vaping Use    Vaping Use: Never used   Substance Use Topics    Alcohol use: Yes     Comment: 3-4 16 oz beers occ    Drug use: No     He  reports that he has quit smoking. His smoking use included cigarettes. He has a 11.25 pack-year smoking history. He has never used smokeless tobacco.  He  reports current alcohol use. Review of Systems:  Cardiac symptoms as noted above in HPI. All others negative. Denies fatigue, malaise, skin rash, joint pain, blurring vision, photophobia, neck pain, hemoptysis, chronic cough, nausea, vomiting, hematuria, burning micturition, BRBPR, chronic headaches. Physical Exam:  BP Readings from Last 3 Encounters:   06/30/22 126/80   06/09/22 125/74   05/20/22 127/64         Pulse Readings from Last 3 Encounters:   06/30/22 84   06/09/22 92   05/20/22 81          Wt Readings from Last 3 Encounters:   06/30/22 101.6 kg (224 lb)   06/09/22 101.7 kg (224 lb 3.2 oz)   05/20/22 101.2 kg (223 lb)       Constitutional: Oriented to person, place, and time. HENT: Head: Normocephalic and atraumatic  Neck: No JVD present. Carotid bruit is not appreciated. Cardiovascular: Regular rhythm. No murmur, gallop or rubs appreciated  Lung: Breath sounds normal. No respiratory distress. No ronchi or rales appreciated  Abdominal: No tenderness. No rebound and no guarding. Musculoskeletal: There is no lower extremity edema.  No cynosis    Review of Data  LABS:   Lab Results   Component Value Date/Time    Sodium 140 06/14/2022 02:20 PM    Potassium 3.9 06/14/2022 02:20 PM    Chloride 109 06/14/2022 02:20 PM    CO2 25 06/14/2022 02:20 PM    Glucose 103 (H) 06/14/2022 02:20 PM    BUN 11 06/14/2022 02:20 PM    Creatinine 0.95 06/14/2022 02:20 PM     Lipids Latest Ref Rng & Units 8/27/2019   Chol, Total <200 MG/   HDL 40 - 60 MG/DL 56   LDL 0 - 100 MG/. 2(H)   Trig <150 MG/DL 99   Chol/HDL Ratio 0 - 5.0   3.2   Some recent data might be hidden     Lab Results   Component Value Date/Time    ALT (SGPT) 29 06/14/2022 02:20 PM     No results found for: HBA1C, TAB0TWEI, ZAO6SDFU, BUM3OEUI    EKG    HOLTER (12/19)  Gita Ratliff was in Sinus . The average heart rate, excluding ectopy, was 87 BPM with a minimum of 60 BPM at 05:49 D2 and a maximum of 153 BPM at 19:21 D2. Heart beats, including ectopy, totaled 173502 beats. VENTRICULAR ECTOPICS totaled 5756 averaging 134. 2 per hour with 4941 single, 58 paired, 349 trigeminy and 0 R on T. BIGEMINY runs occurred  123 times and totaled 399 beats. PVC [place] 2.7%  SUPRAVENTRICULAR ECTOPICS totaled 43 averaging 1.0 per hour ,with 41 single and 2 paired beats. Patient diary was submitted, no symptoms noted. No patient triggered events noted. ECHO (04/2022)  CONCLUSIONS     * Left ventricular systolic function is mildly reduced with an ejection fraction of 46 % by Dominguez's biplane.     * Hypokinesis of the basal-mid anterolateral/ inferolateral and basal inferior/anterior/inferoseptal walls.     * Left ventricular diastolic function: indeterminate.     * Normal RV size with reduced systolic function.     * Dilated LA.     * No hemodynamically significant valvular disease.     * Unable to estimate pulmonary arterial pressure due to inadequate tricuspid regurgitant Doppler waveforms. STRESS TEST (01/20)  · Baseline ECG: Normal EKG, non-specific ST-T wave abnormalities. · Gated SPECT: Left ventricular function post-stress was abnormal. Calculated ejection fraction is 48%.   · Left ventricular perfusion is probably normal.  · Myocardial perfusion imaging defect 1: There is a defect that is small in size present in the inferior location(s) that is more pronounced with rest than stress imaging. The defect appears to probably be artifact. · There was no convincing evidence of significant reversible defect to suggest on going major ischemia. Inferior defect is most consistent with diaphragmatic attenuation artifact     CATHETERIZATION    IMPRESSION & PLAN:  Mr. Liv Kincaid is 80 y.o. male with medical problem as mentioned above    Chest pain:  No chest pain since last visit. nuclear stress test in January 2020, low risk  In 04/2022 showed EF 46% with some hypokinesis of basal to mid anterolateral/inferolateral and basal inferior/anterior/inferoseptal walls  Invasive coronary evaluation can be considered to rule out definitive coronary disease as patient has history of mild cardiomyopathy with occasional chest pain and dyspnea and syncope however patient recently had a severe anemia requiring blood transfusion. He also has ongoing GI and work-up for his GI bleed and cirrhosis. I am going to wait for GI work-up to be finished and have requested callback from GI team for further recommendation regarding the timing of possible coronary evaluation. I discussed with patient regarding cardiac catheterization, risk benefit and alternatives. Patient would like to wait before proceeding with the cardiac catheterization at this time    Cardiomyopathy:  EF 45-50% in 2019. Repeat echo in January 2020, EF 50%  EF 45-50% by echocardiogram in 04/2022 with some regional wall motion abnormality  Currently no evidence of fluid overload on exam.    Syncope:  Patient has been having syncopal episode, unexplained for last 7 years. Echo and stress in 2020, low risk  implantation of loop recorder in May 2020. Last loop recorder interrogation was in December 2021. No tachy or bradycardia arrhythmia was reported  We will proceed with loop recorder interrogation again   Unable to establish etiology so far.   Could be vasovagal.  Loop recorder has not identified any tachy or bradycardia arrhythmia    Tobacco abuse:  Patient is currently smoking 1 pack every 3 days. Smoking for 45 years. Chronic Microcytic Anemia    status post 3 units of blood transfusion in 04/2022  - History of iron deficiency anemia and variceal varices  - EGD 9/28/2021 non bleeding grade I esophageal varices  - Colonoscopy 9/15/2020 hemorrhoids  - EGD Multiple non-bleeding angiodysplastic lesions in the stomach in 04/2022  [Outpatient capsule per Gastroenterology      Liver Mass x  - US: 4/27/22: 2 masses suspicious for malignancy. Left lobe 4.1 x 4.4 cm and Right lobe 4.9 x 4.3 cm.   - AFP and  negative  - MRI benign hemangiomas, enlarged structure in the left retroperitoneum could be lymph node or an exophytic adrenal nodule. This could be metastatic from known neuroendocrine tumor primary. Lipid poor adrenal adenoma is possibly within the imaging differential.   - Gastroenterology following   Oncology outpatient follow up with [facility]   Outpatient biopsy per IR      Cirrhosis   defer to GI team    This plan was discussed with patient who is in agreement. Thank you for allowing me to participate in patient care. Please feel free to call me if you have any question or concern. Rober Palomo MD  Please note: This document has been produced using voice recognition software. Unrecognized errors in transcription may be present.

## 2022-06-30 NOTE — LETTER
6/30/2022    Patient: Emelina Whelan   YOB: 1950   Date of Visit: 6/30/2022     Marla Fang NP  [facility]  [address] [facility] [address]    Dear Marla Fang NP,      Thank you for referring Mr. Moe Sebastian to [address] [place] [address] for evaluation. My notes for this consultation are attached. If you have questions, please do not hesitate to call me. I look forward to following your patient along with you.       Sincerely,    Tejinder Valdivia MD

## 2022-07-21 PROBLEM — D50.0 IRON DEFICIENCY ANEMIA DUE TO CHRONIC BLOOD LOSS: Status: ACTIVE | Noted: 2022-01-01

## 2022-07-21 NOTE — PROGRESS NOTES
Hematology/Oncology  Progress Note    Name: Roxanne Hart  Date: 2022  : 1950  Primary Care Provider: Valerie Caro NP    Mr. Smita Lombardo is a 80y.o. year old male with iron deficiency anemia secondary to upper GI loss. He has had endoscopy which revealed multiple scarring from bleeding esophageal varices. Patient does have cirrhosis as well. He does follow-up with gastroenterologist regarding his liver function. Patient was recently admitted to [facility] where he had endoscopy several times had imaging of the abdomen. During the imaging he had a CAT scan lesions in the liver which MRI is suggesting it is a hemangioma that would be consistent with hemangioma normal as observed at [address] [facility] scans in the past.  However he was also noted to have left     Aortic enlarging lesion the lymph nodes  not sure. The [facility] surgical oncology is planning an outpatient biopsy of this area. And that was in May we do not have any evidence of pathology so far. Patient has osteoarthritis of her hip and is hoping to have a hip replacement. We shall obtain several blood tests to see whether the patient needs intravenous iron replacement in addition to his oral iron replacement. He has anemia and low iron stores. The latter is not responding to oral iron. Current Therapy: Evaluation. Subjective: The past medical, surgical and social history has been reviewed and remains unchanged.    Past Medical History:   Diagnosis Date    Anemia     Cirrhosis ([address]     DJD (degenerative joint disease)     Esophageal varices ([address]     ETOH abuse     Eye inflamed     GI bleed     Requiring BT    History of loop recorder 2020    Subarachnoid hemorrhage ([address] 2016    Syncope      Past Surgical History:   Procedure Laterality Date    COLONOSCOPY N/A 2019    COLONOSCOPY performed by Virgil Mathews MD at [facility] ENDOSCOPY    HX CATARACT REMOVAL Left 2018    HX ENDOSCOPY 05/02/2022    EGD    AR INSERTION SUBQ CARDIAC RHYTHM MONITOR W/PRGRMG N/A 5/29/2020    LOOP RECORDER INSERT performed by Inderjit Coreas MD at [facility]     Social History     Socioeconomic History    Marital status:      Spouse name: Not on file    Number of children: Not on file    Years of education: Not on file    Highest education level: Not on file   Occupational History    Not on file   Tobacco Use    Smoking status: Every Day     Packs/day: 0.25     Years: 45.00     Pack years: 11.25     Types: Cigarettes    Smokeless tobacco: Never   Vaping Use    Vaping Use: Never used   Substance and Sexual Activity    Alcohol use: Yes     Comment: 3-4 16 oz beers occ    Drug use: No    Sexual activity: Not Currently   Other Topics Concern    Not on file   Social History Narrative    Not on file     Social Determinants of Health     Financial Resource Strain: Not on file   Food Insecurity: Not on file   Transportation Needs: Not on file   Physical Activity: Not on file   Stress: Not on file   Social Connections: Not on file   Intimate Partner Violence: Not on file   Housing Stability: Not on file     Family History   Problem Relation Age of Onset    Cancer Mother     Diabetes Mother     Breast Cancer Mother     Cancer Father     Heart Disease Brother      Current Outpatient Medications   Medication Sig Dispense Refill    ferrous sulfate 325 mg (65 mg iron) tablet Take 325 mg by mouth daily. omeprazole (PRILOSEC) 20 mg capsule Take 10 mg by mouth daily. thiamine HCL (B-1) 100 mg tablet Take 50 mg by mouth daily. folic acid (FOLVITE) 1 mg tablet Take 1 mg by mouth daily. Therapy M 9 mg iron-400 mcg tab tablet          Review of Systems:    General :The patient has complaints hip pain    Psychological : patient denies having any psychological symptoms such as hallucinations depression or anxiety. Ophthalmic:the patient denies having any visual impairment or eye discomfort.      ENT: there are no abnormalities reported. Allergy and Immunology:the patient denies having any seasonal allergies or allergies to medications other than those already outlined above. Hematological and Lymphatic: the patient denies having any bruising, bleeding or lymphadenopathy. Endocrine: the patient denies having any heat or cold intolerance. There is no history of diabetes or thyroid disorders. Breast: the patient denies having any history of breast mass or lumps. Respiratory:the patient denies having any cough, shortness of breath, or dyspnea on exertion. Cardiovascular: there are no complaints of chest pain, palpitations, chest pounding, or dyspnea on exertion. Gastrointestinal: the patient denies having nausea, emesis, diarrhea, constipation, or blood in the stool. Genito-Urinary: the patient denies having urinary urgency, frequency, or dysuria. Musculoskeletal: with the exception of mild arthralgias the patient has no other musculoskeletal complaints. Neurological:  denies having any numbness, tingling, or neurologic deficits. Dermatological: patient denies having any unexplained rash, skin ulcerations, or hives. Objective:     Visit Vitals  /83   Pulse 78   Temp 97.7 °F (36.5 °C)   Resp 18   Ht 5' 9\" (1.753 m)   Wt 102.1 kg (225 lb)   SpO2 97%   BMI 33.23 kg/m²     Pain Score: 6    Physical Exam:     ECO    General: Chronically ill appearing, in NAD    Psychologic: mood and affect are appropriate, no anxiety or depression noted    Skin: examination of the skin reveals no bruising, rash or petechiae    HEENT: Normocephalic, atraumatic. Conjunctiva and sclera are clear. Pupils are equal, round and reactive to light. EOMs are intact.      Neck: supple without lymphadenopathy, JVD or thyromegaly    Lymphatics: no palpable cervical, supraclavicular, infraclavicular, axillary or inguinal lymphadenopathy    Lungs: clear breath sounds bilaterally, no rhonchi or wheezes noted    Heart: Regular rate and rhythm, no murmurs, rubs or gallops, S1-S2 noted. Abdomen: soft, non-tender, non-distended, no HSM    Extremities: without clubbing, cyanosis or edema    Neurologic: no focal deficits, steady gait, Alert and oriented x 3. Laboratory Data:     Results for orders placed or performed during the hospital encounter of 07/01/19   CBC WITH 3 PART DIFF     Status: Abnormal   Result Value Ref Range Status    WBC 4.5 4.5 - 13.0 K/uL Final    RBC 4.14 4.10 - 5.10 M/uL Final    HGB 10.9 (L) 12.0 - 16.0 g/dL Final    HCT 37.7 36 - 48 % Final    MCV 91.1 78 - 102 FL Final    MCH 26.3 25.0 - 35.0 PG Final    MCHC 28.9 (L) 31 - 37 g/dL Final    RDW 25.6 (H) 11.5 - 14.5 % Final    NEUTROPHILS 58 40 - 70 % Final    Mixed cells 18 (H) 0.1 - 17 % Final    LYMPHOCYTES 25 14 - 44 % Final    ABS. NEUTROPHILS 2.6 1.8 - 9.5 K/UL Final    ABS. MIXED CELLS 0.8 0.0 - 2.3 K/uL Final    ABS. LYMPHOCYTES 1.1 1.1 - 5.9 K/UL Final     Comment: Test performed at [address] [place] [address] or Outpatient Infusion Center Location. Reviewed by Medical Director. DF AUTOMATED   Final       Patient Active Problem List   Diagnosis Code    Syncope R55    Anemia D64.9    Frequent PVCs I49.3    Dry eye syndrome of bilateral lacrimal glands H04.123    Open angle with borderline findings, low risk, bilateral S68.273    Alcoholic cirrhosis of liver without ascites ([address] K70.30    Hemangioma of intra-abdominal structure D18.03    Elevated LDL cholesterol level E78.00    At high risk for falls Z91.81    Iron deficiency anemia due to chronic blood loss D50.0         Assessment:     1. Iron deficiency anemia due to chronic blood loss    2. Alcoholic cirrhosis of liver without ascites ([address]    3. Hemangioma of liver    4. Primary osteoarthritis of hip, unspecified laterality    5. Thiamine deficiency    6. Vitamin B 12 deficiency    7. Vitamin D deficiency    8.  Esophageal varices determined by endoscopy [facility])        Plan:     Iron stores are low and not responding to oral supplementation. We shal  proceed with IV iron  Patient is following up with PCP,  Pulmonary and with GI, as well as Ortho  We shall follow-up with the patient in 10 weeks unless there is a problem  We may plan a CAT scan to be repeated in the future   Patient had opportunity to ask questions which were answered. Patient is in agreement with this plan    Follow-up and Dispositions    Return in about 10 weeks (around 9/29/2022) for Follow up with labs, Follow_up In Person.         Orders Placed This Encounter    CBC WITH AUTOMATED DIFF     Standing Status:   Future     Standing Expiration Date:   7/22/2023    FERRITIN     Standing Status:   Future     Standing Expiration Date:   7/22/2023    VITAMIN D, 25 HYDROXY     Standing Status:   Future     Standing Expiration Date:   7/22/2023    VITAMIN B12 & FOLATE     Standing Status:   Future     Standing Expiration Date:   6/46/2143    METABOLIC PANEL, COMPREHENSIVE     Standing Status:   Future     Standing Expiration Date:   7/22/2023    IRON PROFILE     Standing Status:   Future     Standing Expiration Date:   7/22/2023       Beryle Lacer, MD  7/21/2022

## 2022-07-25 NOTE — PROGRESS NOTES
[facility] Progress Note    Date: 2022    Name: Leonardo Christianson    MRN: [id number]         : 1950    Peripheral Lab Draw      Mr. Indiana Luna to [facility], ambulatory at 1045 accompanied by self. Pt was assessed and education was provided. Mr. Arndt's vitals were reviewed and patient was observed for 5 minutes prior to treatment. Visit Vitals  Temp 97.8 °F (36.6 °C)       Blood obtained peripherally from left arm x 1 attempt with butterfly needle and sent to lab per written orders. No bleeding or hematoma noted at site. Gauze and coban applied. Mr. Indiana Luna tolerated the phlebotomy, and had no complaints. Patient armband removed and shredded. Mr. Indiana Luna was discharged from [place] [address] in stable condition at 1055.      Russ Romero Phlebotomist PCT  2022  11:36 AM

## 2022-08-02 NOTE — PROGRESS NOTES
[facility] Progress Note    Date: 2022    Name: Dev Hair    MRN: [id number]         : 1950    Venofer Infusion 1/3    [facility] to [facility], ambulatory, at 0950. Pt was assessed and education handout was provided and reviewed. Mr. Arndt's vitals were reviewed and patient was observed for 5 minutes prior to treatment. Visit Vitals  BP (!) 156/80 (BP 1 Location: Left upper arm, BP Patient Position: Sitting)   Pulse 78   Temp 97.8 °F (36.6 °C)   Resp 16   SpO2 96%         22 g PIV placed in L AC x 1 attempt. PIV flushed easily and had brisk blood return. NS initiated @ 25 mL/hr    Venofer 300 mg was initiated and infused over approx. 90 min. Mr. Emmie Ness tolerated the infusion, and had no complaints. Patient was observed for 30 minutes after the infusion, VS remained stable. PIV flushed with NS 10 ml and removed. No bleeding or hematoma noted at site. Guaze and coban applied. Reviewed discharge instructions with patient, including expected side effects (abdominal cramping, nausea, changes in color of urine or feces) and signs of allergic reaction requiring medical attention (itching/hives/rashes, SOB, chest pain, lip/tongue/facial swelling). Patient given printed copy to take home. Patient verbalized understanding of discharge instructions. Patient armband removed and shredded. Mr. Emmie Ness was discharged from [place] [address] in stable condition at 1225. He is to follow up with [facility] on 2022 at 1300 for his next Venofer infusion.     Laci Perez RN  2022  10:48 AM

## 2022-08-15 NOTE — PROGRESS NOTES
I have personally seen and evaluated the device findings. Interrogation reviewed and I agree with assessment.     Josselin Bill

## 2022-08-16 NOTE — PROGRESS NOTES
[facility] Progress Note    Date: 2022    Name: Miguel Whitlock    MRN: [id number]         : 1950    Venofer Infusion 2/3    Mr. Arndt to [facility], ambulatory, at 1310. Pt was assessed and education handout was provided and reviewed. Mr. Arndt's vitals were reviewed and patient was observed for 5 minutes prior to treatment. Visit Vitals  BP (!) 149/77 (BP 1 Location: Left upper arm, BP Patient Position: Sitting)   Pulse 86   Temp 97.9 °F (36.6 °C)   Resp 18   SpO2 99%         22 g PIV placed in R AC x 1 attempt. PIV flushed easily and had brisk blood return. Venofer 300 mg was initiated and infused over approx. 90 min. Mr. Mike Palma tolerated the infusion, and had no complaints. VS remained stable. PIV flushed with NS 10 ml and removed. No bleeding or hematoma noted at site. Guaze and coban applied. Reviewed discharge instructions with patient, including expected side effects (abdominal cramping, nausea, changes in color of urine or feces) and signs of allergic reaction requiring medical attention (itching/hives/rashes, SOB, chest pain, lip/tongue/facial swelling). Patient given printed copy to take home. Patient verbalized understanding of discharge instructions. Patient armband removed and shredded. Mr. Mike Palma was discharged from [place] [address] in stable condition at 1500. He is to follow up with [facility] on 2022 at 1000 for his next Venofer infusion.     Beth Pope RN  2022  10:48 AM

## 2022-08-26 PROBLEM — C7A.8 MALIGNANT NEUROENDOCRINE NEOPLASM (HCC): Status: ACTIVE | Noted: 2022-01-01

## 2022-08-26 PROBLEM — D64.9 ANEMIA: Status: RESOLVED | Noted: 2018-08-04 | Resolved: 2022-01-01

## 2022-08-26 PROBLEM — K74.60 HEPATIC CIRRHOSIS (HCC): Status: ACTIVE | Noted: 2022-01-01

## 2022-08-26 NOTE — PROGRESS NOTES
[address] [place] [address]               [phone]      Bradly Weller is a 80 y.o. male and presents with Follow Up Chronic Condition, Hypertension, and Dizziness       Assessment/Plan:    Diagnoses and all orders for this visit:    1. Syncope, unspecified syncope type  Pt states he has had no syncopal episodes since his transfusions in the hospital and subsequent iron infusions as an outpatient  Will continue to monitor  He has been given the number to Dr. Ivone Rivera to continue with the recommended neuro evaluation  Patient verbalized understanding and is in agreement with this plan of care    2. Chronic right hip pain  -     REFERRAL TO ORTHOPEDICS  -     cyclobenzaprine (FLEXERIL) 5 mg tablet; Take 1 Tablet by mouth three (3) times daily as needed for Muscle Spasm(s). States he has been told in the past his hip is bone on bone  Refer to ortho for further evaluation  Rx flexeril to use as needed  3. Elevated blood pressure reading in office with white coat syndrome, without diagnosis of hypertension  Currently not on medication, blood pressure stable in office, continue to monitor  4. Malignant neuroendocrine neoplasm [facility])  Assessment & Plan:  Managed by specialist         Follow-up and Dispositions    Return in about 4 months (around 12/26/2022) for HTN, syncope, right hip pain follow up , 30 min, office only. Health Maintenance:   Health Maintenance   Topic Date Due    COVID-19 Vaccine (1) Never done    Pneumococcal 65+ years (1 - PCV) Never done    DTaP/Tdap/Td series (1 - Tdap) Never done    Shingrix Vaccine Age 50> (1 of 2) Never done    AAA Screening 73-67 YO Male Smoking Patients  Never done    Flu Vaccine (1) 09/01/2022    Medicare Yearly Exam  04/27/2023    Depression Screen  07/21/2023    Lipid Screen  04/27/2027    Colorectal Cancer Screening Combo  09/14/2030    Hepatitis C Screening  Completed        Subjective:    Labs obtained prior to visit? NO  Reviewed with patient? Not applicable    Hypertension:  Visit Vitals  /71 (BP 1 Location: Right arm, BP Patient Position: Sitting, BP Cuff Size: Adult) Comment (BP Patient Position): feet flat on floor   Pulse 66   Temp 98.3 °F (36.8 °C) (Temporal)   Resp 20   Ht 5' 9\" (1.753 m)   Wt 227 lb (103 kg)   SpO2 100%   BMI 33.52 kg/m²      Patient reports taking medications as instructed. Other, currently not on medications   Medication side effects noted. N/A  Headache upon wakening. no   Home BP monitoring: Does not check  Do you experience chest pain/pressure or SOB with exertion? no  Maintain a low Sodium diet? yes  Key CAD CHF Meds       Patient is on no cardiovascular meds. BUN   Date Value Ref Range Status   07/25/2022 7 7.0 - 18 MG/DL Final     Creatinine   Date Value Ref Range Status   07/25/2022 1.07 0.6 - 1.3 MG/DL Final     GFR est AA   Date Value Ref Range Status   07/25/2022 >60 >60 ml/min/1.73m2 Final     Potassium   Date Value Ref Range Status   07/25/2022 4.0 3.5 - 5.5 mmol/L Final       Syncope  Last episode was in May prior to his hosptialization. During admission he was given transfusions  Has since follow up with hematology and is receiving iron ifusions  Had camera pill procedure completed, several vessels were found that were suspicious for the cause of his GI bleeding, per patient per Dr. García Sky    Lung nodules  Is seeing Dr. Shavon Laws with pulmonary  Last seen was 2 weeks ago    Right hip pain  Long standing  States he was seen in the past and told his hip was \"bone on bone\" and he needed a hip replacement  Also states he was told he had to be down to 150 pounds in order to get the hip replacemtne  No documentation of this    ROS:     ROS  As stated in HPI, otherwise all others negative. The problem list was updated as a part of today's visit.   Patient Active Problem List   Diagnosis Code    Syncope R55    Frequent PVCs I49.3    Dry eye syndrome of bilateral lacrimal glands N97.971    Open angle with borderline findings, low risk, bilateral P46.561    Alcoholic cirrhosis of liver without ascites (HCC) K70.30    Hemangioma of intra-abdominal structure D18.03    Elevated LDL cholesterol level E78.00    At high risk for falls Z91.81    Iron deficiency anemia due to chronic blood loss D50.0    Current smoker F17.200    Hepatic cirrhosis (HCC) K74.60    Malignant neuroendocrine neoplasm (HCC) C7A.8       The PSH, FH were reviewed. SH:  Social History     Tobacco Use    Smoking status: Every Day     Packs/day: 0.25     Years: 45.00     Pack years: 11.25     Types: Cigarettes    Smokeless tobacco: Never   Vaping Use    Vaping Use: Never used   Substance Use Topics    Alcohol use: Yes     Comment: 3-4 16 oz beers occ    Drug use: No       Medications/Allergies:  Current Outpatient Medications on File Prior to Visit   Medication Sig Dispense Refill    ferrous sulfate 325 mg (65 mg iron) tablet Take 325 mg by mouth daily. omeprazole (PRILOSEC) 20 mg capsule Take 10 mg by mouth daily. thiamine HCL (B-1) 100 mg tablet Take 50 mg by mouth daily. folic acid (FOLVITE) 1 mg tablet Take 1 mg by mouth daily. Therapy M 9 mg iron-400 mcg tab tablet        No current facility-administered medications on file prior to visit. No Known Allergies    Objective:  Visit Vitals  /71 (BP 1 Location: Right arm, BP Patient Position: Sitting, BP Cuff Size: Adult) Comment (BP Patient Position): feet flat on floor   Pulse 66   Temp 98.3 °F (36.8 °C) (Temporal)   Resp 20   Ht 5' 9\" (1.753 m)   Wt 227 lb (103 kg)   SpO2 100%   BMI 33.52 kg/m²    Body mass index is 33.52 kg/m². Physical assessment  Physical Exam  Vitals and nursing note reviewed. Eyes:      Conjunctiva/sclera: Conjunctivae normal.      Pupils: Pupils are equal, round, and reactive to light. Cardiovascular:      Rate and Rhythm: Normal rate and regular rhythm. Heart sounds: Normal heart sounds.  No murmur heard.    No friction rub. No gallop. Pulmonary:      Effort: Pulmonary effort is normal.      Breath sounds: Normal breath sounds. Musculoskeletal:         General: Normal range of motion. Cervical back: Normal range of motion. Skin:     General: Skin is warm and dry. Neurological:      Mental Status: He is alert. Labwork and Ancillary Studies:    CBC w/Diff  Lab Results   Component Value Date/Time    WBC 4.1 (L) 07/25/2022 10:55 AM    HGB 10.7 (L) 07/25/2022 10:55 AM    PLATELET 920 31/90/6957 10:55 AM         Basic Metabolic Profile  Lab Results   Component Value Date/Time    Sodium 139 07/25/2022 10:55 AM    Potassium 4.0 07/25/2022 10:55 AM    Chloride 107 07/25/2022 10:55 AM    CO2 26 07/25/2022 10:55 AM    Anion gap 6 07/25/2022 10:55 AM    Glucose 100 (H) 07/25/2022 10:55 AM    BUN 7 07/25/2022 10:55 AM    Creatinine 1.07 07/25/2022 10:55 AM    BUN/Creatinine ratio 7 (L) 07/25/2022 10:55 AM    GFR est AA >60 07/25/2022 10:55 AM    GFR est non-AA >60 07/25/2022 10:55 AM    Calcium 9.3 07/25/2022 10:55 AM        Cholesterol  Lab Results   Component Value Date/Time    Cholesterol, total 179 08/27/2019 03:53 PM    HDL Cholesterol 56 08/27/2019 03:53 PM    LDL, calculated 103.2 (H) 08/27/2019 03:53 PM    Triglyceride 99 08/27/2019 03:53 PM    CHOL/HDL Ratio 3.2 08/27/2019 03:53 PM           I have discussed the diagnosis with the patient and the intended plan as seen in the above orders. The patient has received an After-Visit Summary and questions were answered concerning future plans. An After Visit Summary was printed and given to the patient. All diagnosis have been discussed with the patient and all of the patient's questions have been answered. Follow-up and Dispositions    Return in about 4 months (around 12/26/2022) for HTN, syncope, right hip pain follow up , 30 min, office only.            PRISCA [facility]   [address] [facility] [address] [place]  [address]

## 2022-08-26 NOTE — PROGRESS NOTES
Room 7    When asked if patient has any concerns he would like to address with HAZEL Alexandra patient states yes I would like  muscle relaxer's for right hip  . Did patient bring someone? No    Did the patient have DME equipment? Yes Jared Guillen     Did you take your medication today? No       1. \"Have you been to the ER, urgent care clinic since your last visit? Hospitalized since your last visit? \" No    2. \"Have you seen or consulted any other health care providers outside of the [address] [place] [address] since your last visit? \" No     3. For patients aged 39-70: Has the patient had a colonoscopy / FIT/ Cologuard? Yes - no Care Gap present      If the patient is female:    4. For patients aged 41-77: Has the patient had a mammogram within the past 2 years? NA - based on age or sex      11. For patients aged 21-65: Has the patient had a pap smear?  NA - based on age or sex        3 most recent PHQ Screens 7/21/2022   PHQ Not Done Functional capacity motivation limits accuracy   Little interest or pleasure in doing things Not at all   Feeling down, depressed, irritable, or hopeless Not at all   Total Score PHQ 2 0         Health Maintenance Due   Topic Date Due    COVID-19 Vaccine (1) Never done    Pneumococcal 65+ years (1 - PCV) Never done    DTaP/Tdap/Td series (1 - Tdap) Never done    Shingrix Vaccine Age 50> (1 of 2) Never done    AAA Screening 73-69 YO Male Smoking Patients  Never done       Learning Assessment 8/27/2019   PRIMARY LEARNER Patient   HIGHEST LEVEL OF EDUCATION - PRIMARY LEARNER  -   PRIMARY LANGUAGE ENGLISH   LEARNER PREFERENCE PRIMARY PICTURES     LISTENING     VIDEOS     DEMONSTRATION     READING   ANSWERED BY patient   RELATIONSHIP SELF

## 2022-08-26 NOTE — PATIENT INSTRUCTIONS
You have been referred to neurology for your passing out. Please call them to make an appointment.    [address], [address] [employer]  [address]  Phone: [phone]

## 2022-09-09 NOTE — PROGRESS NOTES
[facility] Progress Note    Date: 2022    Name: Leonardo Christianson    MRN: [id number]         : 1950    Venofer Infusion 3/3    Mr. Arndt to [facility], ambulatory, at 0940. Pt was assessed and education handout was provided and reviewed. Mr. Arndt's vitals were reviewed and patient was observed for 5 minutes prior to treatment. Visit Vitals  /76   Pulse 71   Temp 98.8 °F (37.1 °C)   Resp 18   SpO2 98%     Patient Vitals for the past 12 hrs:   Temp Pulse Resp BP SpO2   22 1248 -- 71 18 129/76 98 %   22 0945 98.8 °F (37.1 °C) 88 20 (!) 144/78 95 %       22 g PIV placed in LAC x 1 attempt. PIV flushed easily and had brisk blood return. Venofer 400 mg was initiated at 108 ml/hr over approx. 240 min. Medications Administered       iron sucrose (VENOFER) 400 mg in 0.9% sodium chloride 250 mL IVPB       Admin Date  2022 Action  New Bag Dose  400 mg Rate  108 mL/hr Route  IntraVENous Administered By  Scottnatalya Mantilla                  Mr. Arndt tolerated the infusion, and had no complaints. VS remained stable. PIV flushed with NS 10 ml and removed. No bleeding or hematoma noted at site. Guaze and coban applied. Patient armband removed and shredded. Mr. Indiana Luna was discharged from [place] [address] in stable condition at 1250.  He is to follow up with MD. Dipak Joya RN  2022

## 2022-09-29 NOTE — PROGRESS NOTES
I have personally seen and evaluated the device findings. Interrogation reviewed and I agree with assessment.     Je Jasso

## 2022-10-04 NOTE — PROGRESS NOTES
I have personally seen and evaluated the device findings. Interrogation reviewed and I agree with assessment.     Crystal Calle

## 2022-10-21 ENCOUNTER — DOCUMENTATION ONLY (OUTPATIENT)
Dept: FAMILY MEDICINE CLINIC | Age: 72
End: 2022-10-21

## 2022-10-21 NOTE — PROGRESS NOTES
Called [facility] to get more information on requested equipment was informed the patient is . Review of records show and ED visit on 10/20/22 where the patient was coded and  some where between 2230 and 2300.

## 2023-10-21 NOTE — TELEPHONE ENCOUNTER
Called Sonia back and left vm. Jevon Herzog did NOT prescribe this. Dr. Aram Arce did, let her know of this.
Per Dariana Estes, [employer] advised the patients' refill for lidocaine is going to require a prior [facility]. The phone number is [phone] . .. Howie Blake can be contacted at [phone] [facility] any questions.
OB/GYN

## (undated) DEVICE — INTENDED FOR TISSUE SEPARATION, AND OTHER PROCEDURES THAT REQUIRE A SHARP SURGICAL BLADE TO PUNCTURE OR CUT.: Brand: BARD-PARKER ® CARBON RIB-BACK BLADES

## (undated) DEVICE — (D)PREP SKN CHLRAPRP APPL 26ML -- CONVERT TO ITEM 371833

## (undated) DEVICE — MEDI-VAC NON-CONDUCTIVE SUCTION TUBING: Brand: CARDINAL HEALTH

## (undated) DEVICE — REM POLYHESIVE ADULT PATIENT RETURN ELECTRODE: Brand: VALLEYLAB

## (undated) DEVICE — BLADELESS OBTURATOR: Brand: WECK VISTA

## (undated) DEVICE — COVER LT HNDL BLU PLAS

## (undated) DEVICE — KENDALL SCD EXPRESS SLEEVES, KNEE LENGTH, MEDIUM: Brand: KENDALL SCD

## (undated) DEVICE — SPECIMEN RETRIEVAL POUCH: Brand: ENDO CATCH GOLD

## (undated) DEVICE — KENDALL 500 SERIES DIAPHORETIC FOAM MONITORING ELECTRODE - TEAR DROP SHAPE ( 30/PK): Brand: KENDALL

## (undated) DEVICE — ARM DRAPE

## (undated) DEVICE — DERMABOND SKIN ADH 0.7ML -- DERMABOND ADVANCED 12/BX

## (undated) DEVICE — SUTURE MCRYL SZ 4-0 L18IN ABSRB UD L19MM PS-2 3/8 CIR PRIM Y496G

## (undated) DEVICE — BITE BLK ENDOSCP AD 54FR GRN POLYETH ENDOSCP W STRP SLD

## (undated) DEVICE — NEEDLE HYPO 25GA L1.5IN BVL ORIENTED ECLIPSE

## (undated) DEVICE — CARTRIDGE CLP LIG HEMLOK GRN --

## (undated) DEVICE — CONTAINER PREFIL FRMLN 15ML --

## (undated) DEVICE — BIPOLAR ELECTROHEMOSTASIS CATHETER: Brand: GOLD PROBE

## (undated) DEVICE — INSULATED BLADE ELECTRODE: Brand: EDGE

## (undated) DEVICE — Device

## (undated) DEVICE — 12 ML SYRINGE,LUER-LOCK TIP: Brand: MONOJECT

## (undated) DEVICE — TRAP SPEC COLL POLYP POLYSTYR --

## (undated) DEVICE — COLUMN DRAPE

## (undated) DEVICE — FORCEPS BX L240CM JAW DIA2.8MM L CAP W/ NDL MIC MESH TOOTH

## (undated) DEVICE — SUCTION IRRIGATOR: Brand: ENDOWRIST

## (undated) DEVICE — BLADELESS OPTICAL TROCAR WITH FIXATION CANNULA: Brand: VERSAPORT

## (undated) DEVICE — SUTURE MCRYL SZ 4-0 L27IN ABSRB UD L24MM PS-1 3/8 CIR PRIM Y935H

## (undated) DEVICE — SYR 50ML SLIP TIP NSAF LF STRL --

## (undated) DEVICE — BLADELESS OPTICAL TROCAR WITH FIXATION CANNULA: Brand: VERSAONE

## (undated) DEVICE — STERILE POLYISOPRENE POWDER-FREE SURGICAL GLOVES: Brand: PROTEXIS

## (undated) DEVICE — SEAL UNIV 5-8MM DISP BX/10 -- DA VINCI XI - SNGL USE

## (undated) DEVICE — SOL ANTI-FOG 6ML MEDC -- MEDICHOICE - CONVERT TO 358427

## (undated) DEVICE — DEVICE INFL 60ML 12ATM CONVENIENT LOK REL HNDL HI PRSS FLX

## (undated) DEVICE — FLEX ADVANTAGE 1500CC: Brand: FLEX ADVANTAGE

## (undated) DEVICE — DRAPE,UTILITY,TAPE,15X26,STERILE: Brand: MEDLINE

## (undated) DEVICE — BASIN EMESIS 500CC ROSE 250/CS 60/PLT: Brand: MEDEGEN MEDICAL PRODUCTS, LLC

## (undated) DEVICE — LIGHT HANDLE: Brand: DEVON

## (undated) DEVICE — SOL IRR NACL 0.9% 500ML POUR --

## (undated) DEVICE — SNARE ENDO 2.4MMX230CM -- COLD EXACTO

## (undated) DEVICE — SKIN AFFIX SURG ADHESIVE 72/CS 0.55ML: Brand: MEDLINE

## (undated) DEVICE — KIT COLON W/ 1.1OZ LUB AND 2 END